# Patient Record
Sex: FEMALE | Race: WHITE | NOT HISPANIC OR LATINO | Employment: FULL TIME | URBAN - METROPOLITAN AREA
[De-identification: names, ages, dates, MRNs, and addresses within clinical notes are randomized per-mention and may not be internally consistent; named-entity substitution may affect disease eponyms.]

---

## 2017-01-16 ENCOUNTER — VIRTUAL VISIT (OUTPATIENT)
Dept: FAMILY MEDICINE | Facility: OTHER | Age: 52
End: 2017-01-16

## 2017-04-13 ENCOUNTER — OFFICE VISIT (OUTPATIENT)
Dept: URGENT CARE | Facility: URGENT CARE | Age: 52
End: 2017-04-13
Payer: COMMERCIAL

## 2017-04-13 VITALS
BODY MASS INDEX: 29.28 KG/M2 | DIASTOLIC BLOOD PRESSURE: 100 MMHG | WEIGHT: 154.2 LBS | HEART RATE: 63 BPM | SYSTOLIC BLOOD PRESSURE: 158 MMHG | OXYGEN SATURATION: 99 % | TEMPERATURE: 98.5 F

## 2017-04-13 DIAGNOSIS — L71.0 PERIORAL DERMATITIS: Primary | ICD-10-CM

## 2017-04-13 DIAGNOSIS — L01.00 IMPETIGO: ICD-10-CM

## 2017-04-13 PROCEDURE — 99213 OFFICE O/P EST LOW 20 MIN: CPT | Performed by: PHYSICIAN ASSISTANT

## 2017-04-13 RX ORDER — DOXYCYCLINE 100 MG/1
100 CAPSULE ORAL 2 TIMES DAILY
Qty: 20 CAPSULE | Refills: 0 | Status: SHIPPED | OUTPATIENT
Start: 2017-04-13 | End: 2017-07-19

## 2017-04-13 RX ORDER — MUPIROCIN 20 MG/G
OINTMENT TOPICAL 3 TIMES DAILY
Qty: 22 G | Refills: 1 | Status: SHIPPED | OUTPATIENT
Start: 2017-04-13 | End: 2017-04-18

## 2017-04-13 NOTE — MR AVS SNAPSHOT
After Visit Summary   4/13/2017    Augustus Mcgrath    MRN: [id number]           Patient Information     Date Of Birth          1965        Visit Information        Provider Department      4/13/2017 9:15 AM Javier Vazquez PA-C [facility]        Today's Diagnoses     Perioral dermatitis    -  1    Impetigo           Follow-ups after your visit        Who to contact     If you have questions or need follow up information about today's clinic visit or your schedule please contact [facility] directly at [phone].  Normal or non-critical lab and imaging results will be communicated to you by Au FINANCIERShart, letter or phone within 4 business days after the clinic has received the results. If you do not hear from us within 7 days, please contact the clinic through RingCaptchat or phone. If you have a critical or abnormal lab result, we will notify you by phone as soon as possible.  Submit refill requests through LikeWhere or call your pharmacy and they will forward the refill request to us. Please allow 3 business days for your refill to be completed.          Additional Information About Your Visit        MyChart Information     LikeWhere gives you secure access to your electronic health record. If you see a primary care provider, you can also send messages to your care team and make appointments. If you have questions, please call your primary care clinic.  If you do not have a primary care provider, please call [phone] and they will assist you.        Care EveryWhere ID     This is your Care EveryWhere ID. This could be used by other organizations to access your [facility] medical records  [id number]        Your Vitals Were     Pulse Temperature Pulse Oximetry BMI (Body Mass Index)          63 98.5  F (36.9  C) (Oral) 99% 29.28 kg/m2         Blood Pressure from Last 3 Encounters:   04/13/17 (!) 158/100   11/22/16 138/86   11/03/16 (!) 157/104     Weight from Last 3 Encounters:   04/13/17 154 lb 3.2 oz (69.9 kg)   11/22/16 154 lb 8 oz (70.1 kg)   11/03/16 157 lb (71.2 kg)              Today, you had the following     No orders found for display         Today's Medication Changes          These changes are accurate as of: 4/13/17  9:52 AM.  If you have any questions, ask your nurse or doctor.               Start taking these medicines.        Dose/Directions    doxycycline 100 MG capsule   Commonly known as:  VIBRAMYCIN   Used for:  Perioral dermatitis   Started by:  Javier Vazquez PA-C        Dose:  100 mg   Take 1 capsule (100 mg) by mouth 2 times daily   Quantity:  20 capsule   Refills:  0       mupirocin 2 % ointment   Commonly known as:  BACTROBAN   Used for:  Impetigo   Started by:  Javier Vazquez PA-C        Apply topically 3 times daily for 5 days   Quantity:  22 g   Refills:  1            Where to get your medicines      These medications were sent to [facility] [id number]     Phone:  [phone]     doxycycline 100 MG capsule    mupirocin 2 % ointment                Primary Care Provider Office Phone # Fax #    Sharlene Baltazar [phone]       [facility] [address]  [facility] [address]        Thank you!     Thank you for choosing [facility]  for your care. Our goal is always to provide you with excellent care. Hearing back from our patients is one way we can continue to improve our services. Please take a few minutes to complete the written survey that you may receive in the mail after your visit with us. Thank you!             Your Updated Medication List - Protect others around you: Learn how to safely use, store and throw away your medicines at www.disposemymeds.org.          This list is accurate as of: 4/13/17  9:52 AM.  Always use your most recent med list.                   Brand  Name Dispense Instructions for use    doxycycline 100 MG capsule    VIBRAMYCIN    20 capsule    Take 1 capsule (100 mg) by mouth 2 times daily       mupirocin 2 % ointment    BACTROBAN    22 g    Apply topically 3 times daily for 5 days

## 2017-04-13 NOTE — NURSING NOTE
"Chief Complaint   Patient presents with     Derm Problem     rash on face x last night        Initial BP (!) 158/100 (BP Location: Right arm, Patient Position: Chair, Cuff Size: Adult Regular)  Pulse 63  Temp 98.5  F (36.9  C) (Oral)  Wt 154 lb 3.2 oz (69.9 kg)  SpO2 99%  BMI 29.28 kg/m2 Estimated body mass index is 29.28 kg/(m^2) as calculated from the following:    Height as of 11/22/16: 5' 0.85\" (1.546 m).    Weight as of this encounter: 154 lb 3.2 oz (69.9 kg).  Medication Reconciliation: complete    "

## 2017-07-08 ENCOUNTER — HEALTH MAINTENANCE LETTER (OUTPATIENT)
Age: 52
End: 2017-07-08

## 2017-07-19 ENCOUNTER — OFFICE VISIT (OUTPATIENT)
Dept: FAMILY MEDICINE | Facility: CLINIC | Age: 52
End: 2017-07-19
Payer: COMMERCIAL

## 2017-07-19 VITALS
HEART RATE: 106 BPM | WEIGHT: 156 LBS | TEMPERATURE: 99.5 F | BODY MASS INDEX: 29.62 KG/M2 | DIASTOLIC BLOOD PRESSURE: 118 MMHG | SYSTOLIC BLOOD PRESSURE: 160 MMHG | RESPIRATION RATE: 16 BRPM | OXYGEN SATURATION: 99 %

## 2017-07-19 DIAGNOSIS — N30.90 CYSTITIS: Primary | ICD-10-CM

## 2017-07-19 DIAGNOSIS — N76.0 BACTERIAL VAGINOSIS: ICD-10-CM

## 2017-07-19 DIAGNOSIS — B96.89 BACTERIAL VAGINOSIS: ICD-10-CM

## 2017-07-19 DIAGNOSIS — I10 BENIGN ESSENTIAL HYPERTENSION: ICD-10-CM

## 2017-07-19 LAB
ALBUMIN UR-MCNC: 100 MG/DL
APPEARANCE UR: CLEAR
BACTERIA #/AREA URNS HPF: ABNORMAL /HPF
BILIRUB UR QL STRIP: NEGATIVE
COLOR UR AUTO: YELLOW
GLUCOSE UR STRIP-MCNC: NEGATIVE MG/DL
HGB UR QL STRIP: ABNORMAL
KETONES UR STRIP-MCNC: NEGATIVE MG/DL
LEUKOCYTE ESTERASE UR QL STRIP: ABNORMAL
MICRO REPORT STATUS: ABNORMAL
MUCOUS THREADS #/AREA URNS LPF: PRESENT /LPF
NITRATE UR QL: NEGATIVE
NON-SQ EPI CELLS #/AREA URNS LPF: ABNORMAL /LPF
PH UR STRIP: 5.5 PH (ref 5–7)
RBC #/AREA URNS AUTO: ABNORMAL /HPF (ref 0–2)
SP GR UR STRIP: 1.02 (ref 1–1.03)
SPECIMEN SOURCE: ABNORMAL
URN SPEC COLLECT METH UR: ABNORMAL
UROBILINOGEN UR STRIP-ACNC: 0.2 EU/DL (ref 0.2–1)
WBC #/AREA URNS AUTO: ABNORMAL /HPF (ref 0–2)
WET PREP SPEC: ABNORMAL

## 2017-07-19 PROCEDURE — 87186 SC STD MICRODIL/AGAR DIL: CPT | Performed by: FAMILY MEDICINE

## 2017-07-19 PROCEDURE — 87086 URINE CULTURE/COLONY COUNT: CPT | Performed by: FAMILY MEDICINE

## 2017-07-19 PROCEDURE — 81001 URINALYSIS AUTO W/SCOPE: CPT | Performed by: FAMILY MEDICINE

## 2017-07-19 PROCEDURE — 99214 OFFICE O/P EST MOD 30 MIN: CPT | Performed by: FAMILY MEDICINE

## 2017-07-19 PROCEDURE — 87088 URINE BACTERIA CULTURE: CPT | Performed by: FAMILY MEDICINE

## 2017-07-19 PROCEDURE — 87210 SMEAR WET MOUNT SALINE/INK: CPT | Performed by: FAMILY MEDICINE

## 2017-07-19 RX ORDER — CIPROFLOXACIN 250 MG/1
250 TABLET, FILM COATED ORAL 2 TIMES DAILY
Qty: 6 TABLET | Refills: 0 | Status: SHIPPED | OUTPATIENT
Start: 2017-07-19 | End: 2017-08-01

## 2017-07-19 RX ORDER — METRONIDAZOLE 500 MG/1
500 TABLET ORAL 2 TIMES DAILY
Qty: 14 TABLET | Refills: 0 | Status: SHIPPED | OUTPATIENT
Start: 2017-07-19 | End: 2017-07-26

## 2017-07-19 NOTE — PROGRESS NOTES
SUBJECTIVE:                                                    Augustus Mcgrath is a 51 year old female who presents to clinic today for the following health issues:      URINARY TRACT SYMPTOMS      Duration: Yesterday morning     Description  dysuria and urgency    Intensity:  mild    Accompanying signs and symptoms:  Fever/chills: no   Flank pain no   Nausea and vomiting: no   Vaginal symptoms:  itching  Abdominal/Pelvic Pain: YES- feeling that her bladder is full.    History  History of frequent UTI's: no   History of kidney stones: no   Sexually Active: YES  Possibility of pregnancy: No    Precipitating or alleviating factors: None    Therapies tried and outcome: monistat and IBU for pain   Outcome: help her with pain.    Pt also complain of itchy in the vaginal area.        Problem list and histories reviewed & adjusted, as indicated.  Additional history: as documented    Labs reviewed in EPIC    Reviewed and updated as needed this visit by clinical staffTobacco  Allergies  Med Hx  Surg Hx  Fam Hx  Soc Hx      Reviewed and updated as needed this visit by Provider         ROS:  Constitutional, HEENT, cardiovascular, pulmonary, gi and gu systems are negative, except as otherwise noted.      OBJECTIVE:   BP (!) 160/118  Pulse 106  Temp 99.5  F (37.5  C) (Tympanic)  Resp 16  Wt 156 lb (70.8 kg)  LMP 05/01/2017 (Approximate)  SpO2 99%  BMI 29.62 kg/m2  Body mass index is 29.62 kg/(m^2).  GENERAL: healthy, alert and no distress  EYES: Eyes grossly normal to inspection  HENT: nose and mouth without ulcers or lesions  PSYCH: mentation appears normal, affect normal    Diagnostic Test Results:  Results for orders placed or performed in visit on 07/19/17 (from the past 24 hour(s))   UA reflex to Microscopic and Culture   Result Value Ref Range    Color Urine Yellow     Appearance Urine Clear     Glucose Urine Negative NEG mg/dL    Bilirubin Urine Negative NEG    Ketones Urine Negative NEG mg/dL     Specific Gravity Urine 1.025 1.003 - 1.035    Blood Urine Large (A) NEG    pH Urine 5.5 5.0 - 7.0 pH    Protein Albumin Urine 100 (A) NEG mg/dL    Urobilinogen Urine 0.2 0.2 - 1.0 EU/dL    Nitrite Urine Negative NEG    Leukocyte Esterase Urine Trace (A) NEG    Source Midstream Urine    Urine Microscopic   Result Value Ref Range    WBC Urine 5-10 (A) 0 - 2 /HPF    RBC Urine 25-50 (A) 0 - 2 /HPF    Squamous Epithelial /LPF Urine Moderate (A) FEW /LPF    Bacteria Urine Moderate (A) NEG /HPF    Mucous Urine Present (A) NEG /LPF   Wet prep   Result Value Ref Range    Specimen Description Vagina     Wet Prep (A)      No Trichomonas seen  Clue cells seen  No yeast seen      Micro Report Status FINAL 07/19/2017        ASSESSMENT/PLAN:       ## Cystitis  - ciprofloxacin (CIPRO) 250 MG tablet; Take 1 tablet (250 mg) by mouth 2 times daily  Dispense: 6 tablet; Refill: 0  - Urine Culture Aerobic Bacterial pending   - Follow if symptoms worsen or fail to improve.    ## Bacterial vaginosis  - discussed medication side effects, no alcohol while on medication, metroNIDAZOLE (FLAGYL) 500 MG tablet; Take 1 tablet (500 mg) by mouth 2 times daily for 7 days  Dispense: 14 tablet; Refill: 0    ## Benign essential hypertension  - Pts B/P have been consistently elevated since 11/3/2016. Today B/P's were 162/120 and 160/118. I discussed with pt that her B/P have been consisently elevated and diagnosis of HTN along with complications. I recommended starting anti-hypertensive's. Pt is hesitant and would like to try lifestyle modifications. I recommended limiting salt intake and increasing physical activity. MA visit in 2 weeks to recheck B/P. If at that time B/P is greater than 140/90, pt would benefit from starting anti-hypertensive's.       Sreekanth Rene MD  [facility]

## 2017-07-19 NOTE — MR AVS SNAPSHOT
After Visit Summary   7/19/2017    Augustus Mcgrath    MRN: [id number]           Patient Information     Date Of Birth          1965        Visit Information        Provider Department      7/19/2017 8:20 AM Sreekanth Rene MD [facility]        Today's Diagnoses     Cystitis    -  1    Bacterial vaginosis        Benign essential hypertension           Follow-ups after your visit        Your next 10 appointments already scheduled     Jul 31, 2017  9:00 AM CDT   Nurse Only with [facility]   [facility] ([facility])    [address] [place] [address] [phone]              Who to contact     If you have questions or need follow up information about today's clinic visit or your schedule please contact [facility] directly at [phone].  Normal or non-critical lab and imaging results will be communicated to you by MyChart, letter or phone within 4 business days after the clinic has received the results. If you do not hear from us within 7 days, please contact the clinic through MyChart or phone. If you have a critical or abnormal lab result, we will notify you by phone as soon as possible.  Submit refill requests through Clinipace WorldWide or call your pharmacy and they will forward the refill request to us. Please allow 3 business days for your refill to be completed.          Additional Information About Your Visit        MyChart Information     Clinipace WorldWide gives you secure access to your electronic health record. If you see a primary care provider, you can also send messages to your care team and make appointments. If you have questions, please call your primary care clinic.  If you do not have a primary care provider, please call [phone] and they will assist you.        Care EveryWhere ID     This is your Care EveryWhere ID. This could be used by other organizations to access your [facility]  records  [id number]        Your Vitals Were     Pulse Temperature Respirations Last Period Pulse Oximetry BMI (Body Mass Index)    106 99.5  F (37.5  C) (Tympanic) 16 05/01/2017 (Approximate) 99% 29.62 kg/m2       Blood Pressure from Last 3 Encounters:   07/19/17 (!) 160/118   04/13/17 (!) 158/100   11/22/16 138/86    Weight from Last 3 Encounters:   07/19/17 156 lb (70.8 kg)   04/13/17 154 lb 3.2 oz (69.9 kg)   11/22/16 154 lb 8 oz (70.1 kg)              We Performed the Following     UA reflex to Microscopic and Culture     Urine Culture Aerobic Bacterial     Urine Microscopic     Wet prep          Today's Medication Changes          These changes are accurate as of: 7/19/17  9:56 AM.  If you have any questions, ask your nurse or doctor.               Start taking these medicines.        Dose/Directions    ciprofloxacin 250 MG tablet   Commonly known as:  CIPRO   Used for:  Cystitis   Started by:  Sreekanth Rene MD        Dose:  250 mg   Take 1 tablet (250 mg) by mouth 2 times daily   Quantity:  6 tablet   Refills:  0       metroNIDAZOLE 500 MG tablet   Commonly known as:  FLAGYL   Used for:  Bacterial vaginosis   Started by:  Sreekanth Rene MD        Dose:  500 mg   Take 1 tablet (500 mg) by mouth 2 times daily for 7 days   Quantity:  14 tablet   Refills:  0            Where to get your medicines      These medications were sent to [facility] [address]  [address] [facility] [address]     Phone:  [phone]     ciprofloxacin 250 MG tablet    metroNIDAZOLE 500 MG tablet                Primary Care Provider Office Phone # Fax #    Sharlene Baltazar [phone]       [facility] [address]  [facility] [address]        Equal Access to Services     [facility] BEATRIS AH: Jaswinder Sanderson, wajen luqadaha, qaybta kaalmada cynthia, soila licona. [facility] [phone].    ATENCIÓN:  Si habla gumaro, tiene a lang disposición servicios gratuitos de asistencia lingüística. Emile becerra [phone].    We comply with applicable federal civil rights laws and [place] laws. We do not discriminate on the basis of race, color, national origin, age, disability sex, sexual orientation or gender identity.            Thank you!     Thank you for choosing [facility]  for your care. Our goal is always to provide you with excellent care. Hearing back from our patients is one way we can continue to improve our services. Please take a few minutes to complete the written survey that you may receive in the mail after your visit with us. Thank you!             Your Updated Medication List - Protect others around you: Learn how to safely use, store and throw away your medicines at www.disposemymeds.org.          This list is accurate as of: 7/19/17  9:56 AM.  Always use your most recent med list.                   Brand Name Dispense Instructions for use Diagnosis    ciprofloxacin 250 MG tablet    CIPRO    6 tablet    Take 1 tablet (250 mg) by mouth 2 times daily    Cystitis       metroNIDAZOLE 500 MG tablet    FLAGYL    14 tablet    Take 1 tablet (500 mg) by mouth 2 times daily for 7 days    Bacterial vaginosis

## 2017-07-19 NOTE — NURSING NOTE
"Chief Complaint   Patient presents with     UTI       Initial BP (!) 162/120 (BP Location: Right arm, Patient Position: Chair, Cuff Size: Adult Regular)  Pulse 106  Temp 99.5  F (37.5  C) (Tympanic)  Resp 16  Wt 156 lb (70.8 kg)  LMP 05/01/2017 (Approximate)  SpO2 99%  BMI 29.62 kg/m2 Estimated body mass index is 29.62 kg/(m^2) as calculated from the following:    Height as of 11/22/16: 5' 0.85\" (1.546 m).    Weight as of this encounter: 156 lb (70.8 kg).  Medication Reconciliation: complete     Yolanda Montilla MA      "

## 2017-07-21 LAB
BACTERIA SPEC CULT: ABNORMAL
MICRO REPORT STATUS: ABNORMAL
MICROORGANISM SPEC CULT: ABNORMAL
SPECIMEN SOURCE: ABNORMAL

## 2017-08-01 ENCOUNTER — OFFICE VISIT (OUTPATIENT)
Dept: FAMILY MEDICINE | Facility: CLINIC | Age: 52
End: 2017-08-01
Payer: COMMERCIAL

## 2017-08-01 VITALS
BODY MASS INDEX: 29.24 KG/M2 | DIASTOLIC BLOOD PRESSURE: 98 MMHG | TEMPERATURE: 99.2 F | SYSTOLIC BLOOD PRESSURE: 156 MMHG | HEART RATE: 69 BPM | OXYGEN SATURATION: 98 % | WEIGHT: 154 LBS

## 2017-08-01 DIAGNOSIS — I10 HYPERTENSION, GOAL BELOW 140/90: Primary | ICD-10-CM

## 2017-08-01 PROCEDURE — 99213 OFFICE O/P EST LOW 20 MIN: CPT | Performed by: FAMILY MEDICINE

## 2017-08-01 RX ORDER — LISINOPRIL 20 MG/1
20 TABLET ORAL DAILY
Qty: 90 TABLET | Refills: 1 | Status: SHIPPED | OUTPATIENT
Start: 2017-08-01 | End: 2017-08-01

## 2017-08-01 RX ORDER — LISINOPRIL 20 MG/1
20 TABLET ORAL DAILY
Qty: 90 TABLET | Refills: 1 | Status: SHIPPED | OUTPATIENT
Start: 2017-08-01 | End: 2018-02-14

## 2017-08-01 NOTE — NURSING NOTE
"Chief Complaint   Patient presents with     Hypertension       Initial BP (!) 156/98  Pulse 69  Temp 99.2  F (37.3  C) (Tympanic)  Wt 154 lb (69.9 kg)  LMP 05/01/2017 (Approximate)  SpO2 98%  BMI 29.24 kg/m2 Estimated body mass index is 29.24 kg/(m^2) as calculated from the following:    Height as of 11/22/16: 5' 0.85\" (1.546 m).    Weight as of this encounter: 154 lb (69.9 kg).  Medication Reconciliation: complete     Claudia Long MA    "

## 2017-08-01 NOTE — MR AVS SNAPSHOT
After Visit Summary   8/1/2017    Augustus Mcgrath    MRN: [id number]           Patient Information     Date Of Birth          1965        Visit Information        Provider Department      8/1/2017 2:20 PM Sharlene Baltazar MD [facility]        Today's Diagnoses     Hypertension, goal below 140/90    -  1       Follow-ups after your visit        Follow-up notes from your care team     Return in about 4 weeks (around 8/29/2017) for BP Recheck.      Who to contact     If you have questions or need follow up information about today's clinic visit or your schedule please contact [facility] directly at [phone].  Normal or non-critical lab and imaging results will be communicated to you by MyChart, letter or phone within 4 business days after the clinic has received the results. If you do not hear from us within 7 days, please contact the clinic through MineSense Technologieshart or phone. If you have a critical or abnormal lab result, we will notify you by phone as soon as possible.  Submit refill requests through Kirondo or call your pharmacy and they will forward the refill request to us. Please allow 3 business days for your refill to be completed.          Additional Information About Your Visit        MyChart Information     Kirondo gives you secure access to your electronic health record. If you see a primary care provider, you can also send messages to your care team and make appointments. If you have questions, please call your primary care clinic.  If you do not have a primary care provider, please call [phone] and they will assist you.        Care EveryWhere ID     This is your Care EveryWhere ID. This could be used by other organizations to access your [facility] medical records  [id number]        Your Vitals Were     Pulse Temperature Last Period Pulse Oximetry BMI (Body Mass Index)       69 99.2  F (37.3  C) (Tympanic) 05/01/2017 (Approximate) 98% 29.24 kg/m2         Blood Pressure from Last 3 Encounters:   08/01/17 (!) 156/98   07/19/17 (!) 160/118   04/13/17 (!) 158/100    Weight from Last 3 Encounters:   08/01/17 154 lb (69.9 kg)   07/19/17 156 lb (70.8 kg)   04/13/17 154 lb 3.2 oz (69.9 kg)              Today, you had the following     No orders found for display         Today's Medication Changes          These changes are accurate as of: 8/1/17  3:11 PM.  If you have any questions, ask your nurse or doctor.               Start taking these medicines.        Dose/Directions    lisinopril 20 MG tablet   Commonly known as:  PRINIVIL/ZESTRIL   Used for:  Hypertension, goal below 140/90   Started by:  Sharlene Baltazar MD        Dose:  20 mg   Take 1 tablet (20 mg) by mouth daily   Quantity:  90 tablet   Refills:  1            Where to get your medicines      These medications were sent to [facility] [address] [place] [address] AT [address] [address]    Hours:  24-hours Phone:  [phone]     lisinopril 20 MG tablet                Primary Care Provider Office Phone # Fax #    Sharlene Baltazar [phone]       [facility] [address] [facility] [address]        Equal Access to Services     CASEPR SPEAR AH: Hadii kierra ku hadasho Solaruenali, waaxda luqadaha, qaybta kaalmada adeegyada, soila licona. So [facility] [phone].    ATENCIÓN: Si habla español, tiene a lang disposición servicios gratuitos de asistencia lingüística. Llame al [phone].    We comply with applicable federal civil rights laws and [place] laws. We do not discriminate on the basis of race, color, national origin, age, disability sex, sexual orientation or gender identity.            Thank you!     Thank you for choosing [facility]  for your care. Our goal is always to provide you with excellent care. Hearing back from our patients is  one way we can continue to improve our services. Please take a few minutes to complete the written survey that you may receive in the mail after your visit with us. Thank you!             Your Updated Medication List - Protect others around you: Learn how to safely use, store and throw away your medicines at www.disposemymeds.org.          This list is accurate as of: 8/1/17  3:11 PM.  Always use your most recent med list.                   Brand Name Dispense Instructions for use Diagnosis    lisinopril 20 MG tablet    PRINIVIL/ZESTRIL    90 tablet    Take 1 tablet (20 mg) by mouth daily    Hypertension, goal below 140/90

## 2017-08-01 NOTE — PROGRESS NOTES
SUBJECTIVE:                                                    Augustus Mcgrath is a 51 year old female who presents to clinic today for the following health issues:      Hypertension Follow-up      Outpatient blood pressures have been high at home    Low Salt Diet: Low salt, looking on the packaging now to choose lower salt    She denies chest pain, headaches, SOB.    She tries to get regular exercise.    Two sisters have hypertension, one tried to use diet (Atkins), one also has pre-diabetes and is on losartan    Wt Readings from Last 4 Encounters:   17 154 lb (69.9 kg)   17 156 lb (70.8 kg)   17 154 lb 3.2 oz (69.9 kg)   16 154 lb 8 oz (70.1 kg)        Amount of exercise or physical activity: 4-5 days/week for an average of unsure about 7992-3923 steps    Problems taking medications regularly: No    Medication side effects: none  Diet: regular (no restrictions)      Problem list and histories reviewed & adjusted, as indicated.  Additional history: as documented    Patient Active Problem List   Diagnosis     Menorrhagia     CARDIOVASCULAR SCREENING; LDL GOAL LESS THAN 160     Enthesopathy     Elevated blood pressure reading without diagnosis of hypertension     Need for hepatitis C screening test     Iron deficiency anemia due to chronic blood loss     Hypertension, goal below 140/90     Past Surgical History:   Procedure Laterality Date     retinal surgery      scleral buckle and several lasers both eyes     VITRECTOMY ANTERIOR  13    -[facility]       Social History   Substance Use Topics     Smoking status: Former Smoker     Packs/day: 0.50     Years: 17.00     Types: Cigarettes     Quit date: 11/10/2010     Smokeless tobacco: Never Used      Comment: occ     Alcohol use Yes      Comment: 5 drinks a week      Family History   Problem Relation Age of Onset     Hypertension Mother      HEART DISEASE Mother 85     Heart Failure Mother       of renal failure  related to cardiac testing     Lipids Father      HEART DISEASE Father 85      suddenly while doing yardwork     CEREBROVASCULAR DISEASE Maternal Grandfather      CEREBROVASCULAR DISEASE Paternal Grandfather      Thyroid Disease Sister      hypo     Breast Cancer Sister 53     Uterine Cancer Sister 53     Hypertension Sister          Allergies   Allergen Reactions     Nkda [No Known Drug Allergies]      BP Readings from Last 3 Encounters:   17 (!) 156/98   17 (!) 160/118   17 (!) 158/100    Wt Readings from Last 3 Encounters:   17 154 lb (69.9 kg)   17 156 lb (70.8 kg)   17 154 lb 3.2 oz (69.9 kg)          Reviewed and updated as needed this visit by clinical staffTobacco  Allergies  Meds  Med Hx  Surg Hx  Fam Hx  Soc Hx      Reviewed and updated as needed this visit by Provider         ROS:  Constitutional, HEENT, cardiovascular, pulmonary, gi and gu systems are negative, except as otherwise noted.      OBJECTIVE:   BP (!) 156/98  Pulse 69  Temp 99.2  F (37.3  C) (Tympanic)  Wt 154 lb (69.9 kg)  LMP 2017 (Approximate)  SpO2 98%  BMI 29.24 kg/m2  Body mass index is 29.24 kg/(m^2).  GENERAL: healthy, alert and no distress  NECK: no adenopathy, no asymmetry, masses, or scars, thyroid normal to palpation and no carotid bruits  RESP: lungs clear to auscultation - no rales, rhonchi or wheezes  CV: regular rate and rhythm, normal S1 S2, no S3 or S4, no murmur, click or rub, no peripheral edema and peripheral pulses strong  MS: no gross musculoskeletal defects noted, no edema    Diagnostic Test Results:  none     ASSESSMENT/PLAN:         1. Hypertension, goal below 140/90  New diagnosis today, blood pressure not at goal  Start lisinopril  Augustus to check BP's at home and let me know  Return to clinic 1 mo for follow up blood pressure and labs  - lisinopril (PRINIVIL/ZESTRIL) 20 MG tablet; Take 1 tablet (20 mg) by mouth daily  Dispense: 90 tablet; Refill:  1    Sharlene Baltazar MD  [facility]

## 2018-02-14 ENCOUNTER — TELEPHONE (OUTPATIENT)
Dept: FAMILY MEDICINE | Facility: CLINIC | Age: 53
End: 2018-02-14

## 2018-02-14 DIAGNOSIS — I10 HYPERTENSION, GOAL BELOW 140/90: ICD-10-CM

## 2018-02-15 NOTE — TELEPHONE ENCOUNTER
"Requested Prescriptions   Pending Prescriptions Disp Refills     lisinopril (PRINIVIL/ZESTRIL) 20 MG tablet [Pharmacy Med Name: LISINOPRIL 20MG TABS]  Last Written Prescription Date:  8/1/2017  Last Fill Quantity: 90 tablet,  # refills: 1   Last Office Visit: 8/1/2017   Future Office Visit:      90 tablet 1     Sig: TAKE ONE TABLET BY MOUTH EVERY DAY    ACE Inhibitors (Including Combos) Protocol Failed    2/14/2018  5:09 PM       Failed - Blood pressure under 140/90 in past 12 months    BP Readings from Last 3 Encounters:   08/01/17 (!) 156/98   07/19/17 (!) 160/118   04/13/17 (!) 158/100          Failed - Normal serum creatinine on file in past 12 months    Recent Labs   Lab Test  11/22/16   0854   CR  0.74          Failed - Normal serum potassium on file in past 12 months    Recent Labs   Lab Test  11/22/16   0854   POTASSIUM  4.5          Passed - Recent or future visit with authorizing provider's specialty    Patient had office visit in the last year or has a visit in the next 30 days with authorizing provider.  See \"Patient Info\" tab in inbasket, or \"Choose Columns\" in Meds & Orders section of the refill encounter.          Passed - Patient is age 18 or older       Passed - No active pregnancy on record       Passed - No positive pregnancy test in past 12 months          "

## 2018-02-16 RX ORDER — LISINOPRIL 20 MG/1
TABLET ORAL
Qty: 30 TABLET | Refills: 0 | Status: SHIPPED | OUTPATIENT
Start: 2018-02-16 | End: 2018-03-01

## 2018-02-16 NOTE — TELEPHONE ENCOUNTER
BP above range.  From last OV notes:  1. Hypertension, goal below 140/90  New diagnosis today, blood pressure not at goal  Start lisinopril  Augustus to check BP's at home and let me know  Return to clinic 1 mo for follow up blood pressure and labs  - lisinopril (PRINIVIL/ZESTRIL) 20 MG tablet; Take 1 tablet (20 mg) by mouth daily  Dispense: 90 tablet; Refill: 1  Renetta Martins RN    Reception:  Please call patient to schedule follow-up visit.  Provider:  1 month refill pended.  Message added to schedule OV for blood pressure  Renetta Martins RN

## 2018-03-01 ENCOUNTER — OFFICE VISIT (OUTPATIENT)
Dept: FAMILY MEDICINE | Facility: CLINIC | Age: 53
End: 2018-03-01
Payer: COMMERCIAL

## 2018-03-01 VITALS
RESPIRATION RATE: 16 BRPM | SYSTOLIC BLOOD PRESSURE: 141 MMHG | OXYGEN SATURATION: 98 % | HEIGHT: 61 IN | DIASTOLIC BLOOD PRESSURE: 93 MMHG | TEMPERATURE: 98.5 F | HEART RATE: 86 BPM | BODY MASS INDEX: 29.45 KG/M2 | WEIGHT: 156 LBS

## 2018-03-01 DIAGNOSIS — I10 HYPERTENSION, GOAL BELOW 140/90: ICD-10-CM

## 2018-03-01 DIAGNOSIS — Z12.11 SCREEN FOR COLON CANCER: ICD-10-CM

## 2018-03-01 DIAGNOSIS — Z12.4 SCREENING FOR MALIGNANT NEOPLASM OF CERVIX: ICD-10-CM

## 2018-03-01 DIAGNOSIS — D50.0 IRON DEFICIENCY ANEMIA DUE TO CHRONIC BLOOD LOSS: ICD-10-CM

## 2018-03-01 DIAGNOSIS — Z13.6 CARDIOVASCULAR SCREENING; LDL GOAL LESS THAN 160: ICD-10-CM

## 2018-03-01 DIAGNOSIS — Z01.419 ENCOUNTER FOR WELL WOMAN EXAM WITH ROUTINE GYNECOLOGICAL EXAM: Primary | ICD-10-CM

## 2018-03-01 LAB
ANION GAP SERPL CALCULATED.3IONS-SCNC: 5 MMOL/L (ref 3–14)
BASOPHILS # BLD AUTO: 0 10E9/L (ref 0–0.2)
BASOPHILS NFR BLD AUTO: 0.2 %
BUN SERPL-MCNC: 13 MG/DL (ref 7–30)
CALCIUM SERPL-MCNC: 9.8 MG/DL (ref 8.5–10.1)
CHLORIDE SERPL-SCNC: 107 MMOL/L (ref 94–109)
CO2 SERPL-SCNC: 26 MMOL/L (ref 20–32)
CREAT SERPL-MCNC: 0.65 MG/DL (ref 0.52–1.04)
CREAT UR-MCNC: 210 MG/DL
DIFFERENTIAL METHOD BLD: ABNORMAL
EOSINOPHIL # BLD AUTO: 0.1 10E9/L (ref 0–0.7)
EOSINOPHIL NFR BLD AUTO: 1.2 %
ERYTHROCYTE [DISTWIDTH] IN BLOOD BY AUTOMATED COUNT: 15.4 % (ref 10–15)
GFR SERPL CREATININE-BSD FRML MDRD: >90 ML/MIN/1.7M2
GLUCOSE SERPL-MCNC: 92 MG/DL (ref 70–99)
HCT VFR BLD AUTO: 35.7 % (ref 35–47)
HGB BLD-MCNC: 10.7 G/DL (ref 11.7–15.7)
IRON SATN MFR SERPL: 6 % (ref 15–46)
IRON SERPL-MCNC: 24 UG/DL (ref 35–180)
LYMPHOCYTES # BLD AUTO: 1.9 10E9/L (ref 0.8–5.3)
LYMPHOCYTES NFR BLD AUTO: 18 %
MCH RBC QN AUTO: 23.2 PG (ref 26.5–33)
MCHC RBC AUTO-ENTMCNC: 30 G/DL (ref 31.5–36.5)
MCV RBC AUTO: 77 FL (ref 78–100)
MICROALBUMIN UR-MCNC: 14 MG/L
MICROALBUMIN/CREAT UR: 6.9 MG/G CR (ref 0–25)
MONOCYTES # BLD AUTO: 0.7 10E9/L (ref 0–1.3)
MONOCYTES NFR BLD AUTO: 6.6 %
NEUTROPHILS # BLD AUTO: 7.7 10E9/L (ref 1.6–8.3)
NEUTROPHILS NFR BLD AUTO: 74 %
PLATELET # BLD AUTO: 276 10E9/L (ref 150–450)
POTASSIUM SERPL-SCNC: 3.9 MMOL/L (ref 3.4–5.3)
RBC # BLD AUTO: 4.61 10E12/L (ref 3.8–5.2)
SODIUM SERPL-SCNC: 138 MMOL/L (ref 133–144)
TIBC SERPL-MCNC: 431 UG/DL (ref 240–430)
WBC # BLD AUTO: 10.4 10E9/L (ref 4–11)

## 2018-03-01 PROCEDURE — G0145 SCR C/V CYTO,THINLAYER,RESCR: HCPCS | Performed by: FAMILY MEDICINE

## 2018-03-01 PROCEDURE — 83540 ASSAY OF IRON: CPT | Performed by: FAMILY MEDICINE

## 2018-03-01 PROCEDURE — 83550 IRON BINDING TEST: CPT | Performed by: FAMILY MEDICINE

## 2018-03-01 PROCEDURE — 99396 PREV VISIT EST AGE 40-64: CPT | Performed by: FAMILY MEDICINE

## 2018-03-01 PROCEDURE — G0124 SCREEN C/V THIN LAYER BY MD: HCPCS | Performed by: FAMILY MEDICINE

## 2018-03-01 PROCEDURE — 87624 HPV HI-RISK TYP POOLED RSLT: CPT | Performed by: FAMILY MEDICINE

## 2018-03-01 PROCEDURE — 85025 COMPLETE CBC W/AUTO DIFF WBC: CPT | Performed by: FAMILY MEDICINE

## 2018-03-01 PROCEDURE — 82043 UR ALBUMIN QUANTITATIVE: CPT | Performed by: FAMILY MEDICINE

## 2018-03-01 PROCEDURE — 80048 BASIC METABOLIC PNL TOTAL CA: CPT | Performed by: FAMILY MEDICINE

## 2018-03-01 PROCEDURE — 36415 COLL VENOUS BLD VENIPUNCTURE: CPT | Performed by: FAMILY MEDICINE

## 2018-03-01 RX ORDER — LISINOPRIL 20 MG/1
20 TABLET ORAL DAILY
Qty: 90 TABLET | Refills: 3 | Status: SHIPPED | OUTPATIENT
Start: 2018-03-01 | End: 2019-01-02

## 2018-03-01 NOTE — PROGRESS NOTES
SUBJECTIVE:   CC: Augustus Mcgrath is an 52 year old woman who presents for preventive health visit.     Physical   Annual:     Getting at least 3 servings of Calcium per day::  Yes    Bi-annual eye exam::  Yes    Dental care twice a year::  Yes    Sleep apnea or symptoms of sleep apnea::  None    Diet::  Low salt    Taking medications regularly::  Yes    Medication side effects::  None          Hypertension Follow-up      Outpatient blood pressures are not being checked.    Low Salt Diet: low salt    Does have a tickle in her throat from the lisinopril  BP Readings from Last 3 Encounters:   03/01/18 (!) 141/93   08/01/17 (!) 156/98   07/19/17 (!) 160/118          Health Maintenance Due   Topic Date Due     MICROALBUMIN Q1 YEAR  10/18/1966     PAP Q3 YR  07/10/2016     COLON CANCER SCREEN (SYSTEM ASSIGNED)  10/18/2017     BMP Q1 YR  11/22/2017     CBC Q1 YR  11/22/2017      Hemoglobin   Date Value Ref Range Status   11/22/2016 9.5 (L) 11.7 - 15.7 g/dL Final   06/17/2013 10.6 (L) 11.7 - 15.7 g/dL Final   11/02/2009 8.7 (L) 11.7 - 15.7 g/dL Final     Today's PHQ-2 Score:   PHQ-2 ( 1999 [employer]) 3/1/2018   Q1: Little interest or pleasure in doing things 0   Q2: Feeling down, depressed or hopeless 0   PHQ-2 Score 0   Q1: Little interest or pleasure in doing things Not at all   Q2: Feeling down, depressed or hopeless Not at all   PHQ-2 Score 0       Abuse: Current or Past(Physical, Sexual or Emotional)- No  Do you feel safe in your environment - Yes    Social History   Substance Use Topics     Smoking status: Former Smoker     Packs/day: 0.50     Years: 17.00     Types: Cigarettes     Quit date: 11/10/2010     Smokeless tobacco: Never Used      Comment: occ     Alcohol use Yes      Comment: 5 drinks a week      No flowsheet data found.    Reviewed orders with patient.  Reviewed health maintenance and updated orders accordingly - Yes  BP Readings from Last 3 Encounters:   03/01/18 (!) 141/93   08/01/17 (!) 156/98    17 (!) 160/118    Wt Readings from Last 3 Encounters:   18 156 lb (70.8 kg)   17 154 lb (69.9 kg)   17 156 lb (70.8 kg)                  Current Outpatient Prescriptions   Medication Sig Dispense Refill     lisinopril (PRINIVIL/ZESTRIL) 20 MG tablet Take 1 tablet (20 mg) by mouth daily 90 tablet 3     [DISCONTINUED] lisinopril (PRINIVIL/ZESTRIL) 20 MG tablet TAKE ONE TABLET BY MOUTH EVERY DAY 30 tablet 0     Allergies   Allergen Reactions     Nkda [No Known Drug Allergies]      Recent Labs   Lab Test  16   0854   LDL  110*   HDL  48*   TRIG  148   CR  0.74   GFRESTIMATED  84   GFRESTBLACK  >90   GFR Calc     POTASSIUM  4.5        Patient over age 50, mutual decision to screen reflected in health maintenance.    Pertinent mammograms are reviewed under the imaging tab.  History of abnormal Pap smear:   NO - age 30-65 PAP every 5 years with negative HPV co-testing recommended  Last 3 Pap and HPV Results:   PAP / HPV 7/10/2013 2009 2007   PAP NIL ASC-US(A) NIL       Reviewed and updated as needed this visit by clinical staff  Tobacco  Allergies  Meds  Med Hx  Surg Hx  Fam Hx  Soc Hx        Reviewed and updated as needed this visit by Provider        Past Medical History:   Diagnosis Date     Contraception      has vasectomy     Eye problems     right eye, retinal detachment, LEFT EY PROBLEMATIC AS WELL     Hypertension      Iron deficiency anemia due to chronic blood loss 2016      Past Surgical History:   Procedure Laterality Date     retinal surgery      scleral buckle and several lasers both eyes     VITRECTOMY ANTERIOR  13    -[facility]     Obstetric History       T0      L0     SAB0   TAB1   Ectopic0   Multiple0   Live Births0       # Outcome Date GA Lbr Tono/2nd Weight Sex Delivery Anes PTL Lv   1 TAB                 Family History   Problem Relation Age of Onset     Hypertension Mother      HEART  "DISEASE Mother 85     Heart Failure Mother       of renal failure related to cardiac testing     Lipids Father      HEART DISEASE Father 85      suddenly while doing yardwork     Hyperlipidemia Father      CEREBROVASCULAR DISEASE Maternal Grandfather      CEREBROVASCULAR DISEASE Paternal Grandfather      Thyroid Disease Sister      hypo     Breast Cancer Sister 53     Uterine Cancer Sister 53     Hypertension Sister      Hypertension Sister      Other Cancer Sister      Thyroid Disease Sister      Breast Cancer Sister         Review of Systems  C: NEGATIVE for fever, chills, change in weight  I: NEGATIVE for worrisome rashes, moles or lesions  E: NEGATIVE for vision changes or irritation  ENT: NEGATIVE for ear, mouth and throat problems  R: NEGATIVE for significant cough or SOB  B: NEGATIVE for masses, tenderness or discharge  CV: NEGATIVE for chest pain, palpitations or peripheral edema  GI: NEGATIVE for nausea, abdominal pain, heartburn, or change in bowel habits   menopausal female: still having periods, sometimes every 3 weeks  M: NEGATIVE for significant arthralgias or myalgia  N: NEGATIVE for weakness, dizziness or paresthesias  E: NEGATIVE for temperature intolerance, skin/hair changes  H: NEGATIVE for bleeding problems  P: NEGATIVE for changes in mood or affect      OBJECTIVE:   BP (!) 141/93  Pulse 86  Temp 98.5  F (36.9  C) (Oral)  Resp 16  Ht 5' 1\" (1.549 m)  Wt 156 lb (70.8 kg)  SpO2 98%  BMI 29.48 kg/m2  Physical Exam  GENERAL APPEARANCE: healthy, alert and no distress  EYES: Eyes grossly normal to inspection, PERRL and conjunctivae and sclerae normal  HENT: ear canals and TM's normal, nose and mouth without ulcers or lesions, oropharynx clear and oral mucous membranes moist  NECK: no adenopathy, no asymmetry, masses, or scars and thyroid normal to palpation  RESP: lungs clear to auscultation - no rales, rhonchi or wheezes  BREAST: normal without masses, tenderness or nipple discharge " and no palpable axillary masses or adenopathy  CV: regular rate and rhythm, normal S1 S2, no S3 or S4, no murmur, click or rub, no peripheral edema and peripheral pulses strong  ABDOMEN: soft, nontender, no hepatosplenomegaly, no masses and bowel sounds normal   (female): normal female external genitalia, normal urethral meatus, vaginal mucosal atrophy noted, normal cervix, adnexae, and uterus without masses or abnormal discharge  MS: no musculoskeletal defects are noted and gait is age appropriate without ataxia  SKIN: no suspicious lesions or rashes  NEURO: Normal strength and tone, sensory exam grossly normal, mentation intact and speech normal  PSYCH: mentation appears normal and affect normal/bright    ASSESSMENT/PLAN:   1. Encounter for well woman exam with routine gynecological exam  routine  - Pap imaged thin layer screen with HPV - recommended age 30 - 65 years (select HPV order below)  - HPV High Risk Types DNA Cervical    2. Hypertension, goal below 140/90  Not quite at goal she will check at home and let me know  - BASIC METABOLIC PANEL  - Albumin Random Urine Quantitative with Creat Ratio  - lisinopril (PRINIVIL/ZESTRIL) 20 MG tablet; Take 1 tablet (20 mg) by mouth daily  Dispense: 90 tablet; Refill: 3    3. CARDIOVASCULAR SCREENING; LDL GOAL LESS THAN 160  LDL Cholesterol Calculated   Date Value Ref Range Status   11/22/2016 110 (H) <100 mg/dL Final     Comment:     Above desirable:  100-129 mg/dl   Borderline High:  130-159 mg/dL   High:             160-189 mg/dL   Very high:       >189 mg/dl     ]  At goal    4. Iron deficiency anemia due to chronic blood loss  .  Hemoglobin   Date Value Ref Range Status   11/22/2016 9.5 (L) 11.7 - 15.7 g/dL Final   06/17/2013 10.6 (L) 11.7 - 15.7 g/dL Final   11/02/2009 8.7 (L) 11.7 - 15.7 g/dL Final    will recheck today  - CBC with platelets differential  - Iron and iron binding capacity    5. Screen for colon cancer  Routine screening  - GASTROENTEROLOGY ADULT  "REF PROCEDURE ONLY Margie Joseph [phone]; No Provider Preference    6. Screening for malignant neoplasm of cervix  Done today      COUNSELING:  Reviewed preventive health counseling, as reflected in patient instructions     reports that she quit smoking about 7 years ago. Her smoking use included Cigarettes. She has a 8.50 pack-year smoking history. She has never used smokeless tobacco.    Estimated body mass index is 29.48 kg/(m^2) as calculated from the following:    Height as of this encounter: 5' 1\" (1.549 m).    Weight as of this encounter: 156 lb (70.8 kg).   Weight management plan: Discussed healthy diet and exercise guidelines and patient will follow up in 12 months in clinic to re-evaluate.    Counseling Resources:  ATP IV Guidelines  Pooled Cohorts Equation Calculator  Breast Cancer Risk Calculator  FRAX Risk Assessment  ICSI Preventive Guidelines  Dietary Guidelines for Americans, 2010  USDA's MyPlate  ASA Prophylaxis  Lung CA Screening    Sharlene Baltazar MD  [facility]  Answers for HPI/ROS submitted by the patient on 3/1/2018   PHQ-2 Score: 0    "

## 2018-03-01 NOTE — PATIENT INSTRUCTIONS
Please check your blood pressure at home and let me know if it continues to be > 140/90.    Preventive Health Recommendations  Female Ages 50 - 64    Yearly exam: See your health care provider every year in order to  o Review health changes.   o Discuss preventive care.    o Review your medicines if your doctor has prescribed any.      Get a Pap test every three years (unless you have an abnormal result and your provider advises testing more often).    If you get Pap tests with HPV test, you only need to test every 5 years, unless you have an abnormal result.     You do not need a Pap test if your uterus was removed (hysterectomy) and you have not had cancer.    You should be tested each year for STDs (sexually transmitted diseases) if you're at risk.     Have a mammogram every 1 to 2 years.    Have a colonoscopy at age 50, or have a yearly FIT test (stool test). These exams screen for colon cancer.      Have a cholesterol test every 5 years, or more often if advised.    Have a diabetes test (fasting glucose) every three years. If you are at risk for diabetes, you should have this test more often.     If you are at risk for osteoporosis (brittle bone disease), think about having a bone density scan (DEXA).    Shots: Get a flu shot each year. Get a tetanus shot every 10 years.    Nutrition:     Eat at least 5 servings of fruits and vegetables each day.    Eat whole-grain bread, whole-wheat pasta and brown rice instead of white grains and rice.    Talk to your provider about Calcium and Vitamin D.     Lifestyle    Exercise at least 150 minutes a week (30 minutes a day, 5 days a week). This will help you control your weight and prevent disease.    Limit alcohol to one drink per day.    No smoking.     Wear sunscreen to prevent skin cancer.     See your dentist every six months for an exam and cleaning.    See your eye doctor every 1 to 2 years.

## 2018-03-01 NOTE — MR AVS SNAPSHOT
After Visit Summary   3/1/2018    Augustus Mcgrath    MRN: [id number]           Patient Information     Date Of Birth          1965        Visit Information        Provider Department      3/1/2018 8:20 AM Sharlene Baltazar MD [facility]        Today's Diagnoses     Encounter for well woman exam with routine gynecological exam    -  1    Hypertension, goal below 140/90        CARDIOVASCULAR SCREENING; LDL GOAL LESS THAN 160        Iron deficiency anemia due to chronic blood loss        Screen for colon cancer        Screening for malignant neoplasm of cervix          Care Instructions    Please check your blood pressure at home and let me know if it continues to be > 140/90.    Preventive Health Recommendations  Female Ages 50 - 64    Yearly exam: See your health care provider every year in order to  o Review health changes.   o Discuss preventive care.    o Review your medicines if your doctor has prescribed any.      Get a Pap test every three years (unless you have an abnormal result and your provider advises testing more often).    If you get Pap tests with HPV test, you only need to test every 5 years, unless you have an abnormal result.     You do not need a Pap test if your uterus was removed (hysterectomy) and you have not had cancer.    You should be tested each year for STDs (sexually transmitted diseases) if you're at risk.     Have a mammogram every 1 to 2 years.    Have a colonoscopy at age 50, or have a yearly FIT test (stool test). These exams screen for colon cancer.      Have a cholesterol test every 5 years, or more often if advised.    Have a diabetes test (fasting glucose) every three years. If you are at risk for diabetes, you should have this test more often.     If you are at risk for osteoporosis (brittle bone disease), think about having a bone density scan (DEXA).    Shots: Get a flu shot each year. Get a tetanus shot every 10 years.    Nutrition:      Eat at least 5 servings of fruits and vegetables each day.    Eat whole-grain bread, whole-wheat pasta and brown rice instead of white grains and rice.    Talk to your provider about Calcium and Vitamin D.     Lifestyle    Exercise at least 150 minutes a week (30 minutes a day, 5 days a week). This will help you control your weight and prevent disease.    Limit alcohol to one drink per day.    No smoking.     Wear sunscreen to prevent skin cancer.     See your dentist every six months for an exam and cleaning.    See your eye doctor every 1 to 2 years.            Follow-ups after your visit        Additional Services     GASTROENTEROLOGY ADULT REF PROCEDURE ONLY Margie Joseph [phone]; No Provider Preference       Last Lab Result: Creatinine (mg/dL)       Date                     Value                 11/22/2016               0.74             ----------  There is no height or weight on file to calculate BMI.     Needed:  No  Language:  English    Patient will be contacted to schedule procedure.     Please be aware that coverage of these services is subject to the terms and limitations of your health insurance plan.  Call member services at your health plan with any benefit or coverage questions.  Any procedures must be performed at a [facility] facility OR coordinated by your clinic's referral office.    Please bring the following with you to your appointment:    (1) Any X-Rays, CTs or MRIs which have been performed.  Contact the facility where they were done to arrange for  prior to your scheduled appointment.    (2) List of current medications   (3) This referral request   (4) Any documents/labs given to you for this referral                  Who to contact     If you have questions or need follow up information about today's clinic visit or your schedule please contact [facility] directly at [phone].  Normal or non-critical lab and imaging results will be  "communicated to you by [employer], letter or phone within 4 business days after the clinic has received the results. If you do not hear from us within 7 days, please contact the clinic through [employer] or phone. If you have a critical or abnormal lab result, we will notify you by phone as soon as possible.  Submit refill requests through [employer] or call your pharmacy and they will forward the refill request to us. Please allow 3 business days for your refill to be completed.          Additional Information About Your Visit        [employer] Information     [employer] gives you secure access to your electronic health record. If you see a primary care provider, you can also send messages to your care team and make appointments. If you have questions, please call your primary care clinic.  If you do not have a primary care provider, please call [phone] and they will assist you.        Care EveryWhere ID     This is your Care EveryWhere ID. This could be used by other organizations to access your [place] medical records  [id number]        Your Vitals Were     Pulse Temperature Respirations Height Pulse Oximetry BMI (Body Mass Index)    86 98.5  F (36.9  C) (Oral) 16 5' 1\" (1.549 m) 98% 29.48 kg/m2       Blood Pressure from Last 3 Encounters:   03/01/18 (!) 141/93   08/01/17 (!) 156/98   07/19/17 (!) 160/118    Weight from Last 3 Encounters:   03/01/18 156 lb (70.8 kg)   08/01/17 154 lb (69.9 kg)   07/19/17 156 lb (70.8 kg)              We Performed the Following     Albumin Random Urine Quantitative with Creat Ratio     BASIC METABOLIC PANEL     CBC with platelets differential     GASTROENTEROLOGY ADULT REF PROCEDURE ONLY Margie Joseph [phone]; No Provider Preference     HPV High Risk Types DNA Cervical     Iron and iron binding capacity     Pap imaged thin layer screen with HPV - recommended age 30 - 65 years (select HPV order below)          Today's Medication Changes          These changes are accurate as " of 3/1/18  9:15 AM.  If you have any questions, ask your nurse or doctor.               These medicines have changed or have updated prescriptions.        Dose/Directions    lisinopril 20 MG tablet   Commonly known as:  PRINIVIL/ZESTRIL   This may have changed:  See the new instructions.   Used for:  Hypertension, goal below 140/90   Changed by:  Sharlene Baltazar MD        Dose:  20 mg   Take 1 tablet (20 mg) by mouth daily   Quantity:  90 tablet   Refills:  3            Where to get your medicines      These medications were sent to [facility] [place], [place] - [address]  [address], [facility] [address]     Phone:  [phone]     lisinopril 20 MG tablet                Primary Care Provider Office Phone # Fax #    Sharlene Baltazar [phone]       [address]  [facility] [address]        Equal Access to Services     [facility]: Hadii kierra ku hadasho Soomaali, waaxda luqadaha, qaybta kaalmada adeegyada, waxay robinsonin hayderekn kimmy garcia . So [facility] [place] [phone].    ATENCIÓN: Si habla español, tiene a lang disposición servicios gratuitos de asistencia lingüística. Emile al [phone].    We comply with applicable federal civil rights laws and [place] laws. We do not discriminate on the basis of race, color, national origin, age, disability, sex, sexual orientation, or gender identity.            Thank you!     Thank you for choosing [facility]  for your care. Our goal is always to provide you with excellent care. Hearing back from our patients is one way we can continue to improve our services. Please take a few minutes to complete the written survey that you may receive in the mail after your visit with us. Thank you!             Your Updated Medication List - Protect others around you: Learn how to safely use, store and throw away your medicines at www.disposemymeds.org.          This list is accurate as of 3/1/18  9:15 AM.   Always use your most recent med list.                   Brand Name Dispense Instructions for use Diagnosis    lisinopril 20 MG tablet    PRINIVIL/ZESTRIL    90 tablet    Take 1 tablet (20 mg) by mouth daily    Hypertension, goal below 140/90

## 2018-03-06 NOTE — PROGRESS NOTES
Thank you for getting labs done!      You have an iron deficiency anemia.  I recommend taking an iron supplement, 325 mg, by mouth three times per day, with an acid such as orange or tomato juice. Do not take this .at the same time as milk, Tums, or acid-blocking medication. I also recommend trying to increase iron in your diet. Iron-rich foods include beef and other meats, beans, lentils, iron-fortified cereals, dark green leafy vegetables and dried fruit.     Your glucose level, electrolyte level and kidney function, measured with a test called the basic metabolic panel, is normal, which is great news!      Sincerely,  Dr. Sharlene Baltazar MD  3/6/2018

## 2018-03-07 LAB
COPATH REPORT: ABNORMAL
PAP: ABNORMAL

## 2018-03-08 LAB
FINAL DIAGNOSIS: NORMAL
HPV HR 12 DNA CVX QL NAA+PROBE: NEGATIVE
HPV16 DNA SPEC QL NAA+PROBE: NEGATIVE
HPV18 DNA SPEC QL NAA+PROBE: NEGATIVE
SPECIMEN DESCRIPTION: NORMAL
SPECIMEN SOURCE CVX/VAG CYTO: NORMAL

## 2018-03-09 PROBLEM — R87.610 ASCUS OF CERVIX WITH NEGATIVE HIGH RISK HPV: Status: ACTIVE | Noted: 2018-03-01

## 2018-10-02 ENCOUNTER — VIRTUAL VISIT (OUTPATIENT)
Dept: FAMILY MEDICINE | Facility: OTHER | Age: 53
End: 2018-10-02

## 2018-10-02 NOTE — PROGRESS NOTES
"Date:   Clinician: Juliana Hayes  Clinician NPI: [id number]  Patient: Augustus Mcgrath  Patient : 1965  Patient Address: [address] [place] [address]  Patient Phone: [phone]  Visit Protocol: UTI  Patient Summary:  Augustus is a 52 year old ( : 1965 ) female who initiated a Visit for a presumed bladder infection. When asked the question \"Please sign me up to receive news, health information and promotions. \", Augustus responded \"No\".   Her symptoms started today and consist of feeling as if the bladder is never empty, dysuria, and urinary frequency.   Symptom details   Urine color: The color of her urine is yellow.    Denied symptoms include urinary urgency, foul-smelling urine, flank pain, chills, vaginal itching, nausea, urinary incontinence, vaginal discharge, abdominal pain, and vomiting. She does not feel feverish.   Augustus has not used any over-the-counter medications or home remedies to relieve her current symptoms.  Precipitating events  Augustus denies having a sexually transmitted disease.  Pertinent medical history  Augustus has had a bladder infection before and has had 1 in the past 12 months. Her most recent bladder infection was not within the last 4 weeks. Her current symptoms are similar to her previous bladder infection symptoms.   Ciprofloxacin (Cipro) has been effective in treating her past bladder infections.   Augustus has not been prescribed antibiotics to prevent frequent or repeated bladder infections in the past and does not get yeast infections when she takes antibiotics. She has not experienced problems or side effects with any of the common antibiotics used to treat bladder infections.   Augustus does not have a history of kidney stones. She has not used a catheter or been a patient in a hospital or nursing home in the past 2 weeks.   Augustus does not smoke or use smokeless tobacco.   MEDICATIONS: lisinopril oral, ALLERGIES: NKDA  Clinician " Response:  Dear Augustus,  Based on the information you have provided, you likely have an acute urinary tract infection, also called a bladder infection. Bladder infections occur when bacteria from the outside of the body enters the urinary tract. Any part of the urinary system can be infected, but the bladder is the most common.  Medication information  I am prescribing:     Nitrofurantoin monohyd/m-cryst (Macrobid) 100 mg oral capsule. Take 1 capsule by mouth every 12 hours for 5 days. Take this medication with food. There are no refills with this prescription.   The medication I prescribed for your bladder infection is an antibiotic. Continue taking the medication until it is gone even if you feel better.   Yeast infections can be a common side effect of antibiotics. The most common symptom of a yeast infection is itchiness in and around the vagina. Other signs and symptoms include burning, redness, or a thick, white vaginal discharge that looks like cottage cheese and does not have a bad smell.  Self care  Urination helps to flush bacteria from the urinary tract. For this reason, drinking water and urinating often helps relieve some symptoms of a bladder infection and can decrease your risk of getting bladder infections in the future.  Other steps you can take to prevent future bladder infections include:     Wipe front to back after using the bathroom    Urinate after sexual intercourse    Avoid using deodorant sprays, douches, or powders in the vaginal area     When to seek care  Please make an appointment to be seen in a clinic or urgent care if any of the following occur:     You develop new symptoms or your symptoms become worse    You have medication side effects that make it difficult to take them as prescribed    Your symptoms do not improve within 1-2 days of starting treatment    You have symptoms of a bladder infection that return shortly after completing treatment     It is possible to have an allergic  reaction to an antibiotic even if you have not had one in the past. If you notice a new rash, significant swelling, or difficulty breathing, stop taking this medication immediately and go to a clinic or urgent care.   Diagnosis: Acute uncomplicated bladder infection  Diagnosis ICD: N39.0  Prescription: nitrofurantoin monohyd/m-cryst (Macrobid) 100 mg oral capsule 10 capsule, 5 days supply. Take 1 capsule by mouth every 12 hours for 5 days. Refills: 0, Refill as needed: no, Allow substitutions: yes  Pharmacy: [id number] - [phone] - [address] [place] [address]

## 2019-01-02 ENCOUNTER — OFFICE VISIT (OUTPATIENT)
Dept: FAMILY MEDICINE | Facility: CLINIC | Age: 54
End: 2019-01-02
Payer: COMMERCIAL

## 2019-01-02 VITALS
BODY MASS INDEX: 29.95 KG/M2 | DIASTOLIC BLOOD PRESSURE: 88 MMHG | RESPIRATION RATE: 16 BRPM | SYSTOLIC BLOOD PRESSURE: 156 MMHG | OXYGEN SATURATION: 99 % | HEART RATE: 83 BPM | WEIGHT: 158.5 LBS | TEMPERATURE: 98.6 F

## 2019-01-02 DIAGNOSIS — I10 HYPERTENSION, GOAL BELOW 140/90: ICD-10-CM

## 2019-01-02 DIAGNOSIS — R42 VERTIGO: Primary | ICD-10-CM

## 2019-01-02 PROBLEM — G43.109 MIGRAINE WITH AURA: Status: ACTIVE | Noted: 2019-01-02

## 2019-01-02 PROCEDURE — 99214 OFFICE O/P EST MOD 30 MIN: CPT | Performed by: FAMILY MEDICINE

## 2019-01-02 RX ORDER — HYDROCHLOROTHIAZIDE 25 MG/1
25 TABLET ORAL DAILY
Qty: 90 TABLET | Refills: 3 | Status: SHIPPED | OUTPATIENT
Start: 2019-01-02 | End: 2019-09-27 | Stop reason: SINTOL

## 2019-01-02 RX ORDER — MECLIZINE HYDROCHLORIDE 25 MG/1
25 TABLET ORAL 3 TIMES DAILY PRN
Qty: 30 TABLET | Refills: 0 | Status: SHIPPED | OUTPATIENT
Start: 2019-01-02 | End: 2019-09-27

## 2019-01-02 NOTE — PATIENT INSTRUCTIONS
Patient Education     Inner Ear Problems: Causes of Dizziness (Vertigo)       Benign positional vertigo (BPV)  This is the most common cause of vertigo. BPV is also called benign positional paroxysmal vertigo (BPPV). It happens when crystals in the ear canals shift into the wrong place. Vertigo usually occurs when you move your head in a certain way. This can happen when turning in bed, bending, or looking up. Because BPV comes on quickly, you should think about if you are safe to drive or do other tasks that need your full attention.  BPV:    Causes vertigo that last for seconds. Vertigo can occur several times a day, depending on body position.    Doesn t cause hearing loss    Often goes away on its own. But it but may go away sooner with treatment.  Infection or inflammation  Sometimes the semicircular canals swell and send incorrect balance signals. This problem may be caused by a viral infection. Depending on the cause, your hearing can be affected (labyrinthitis). Or your hearing can remain normal (neuronitis).  Infection or inflammation:    Causes vertigo that lasts for hours or days. The first episode is usually the worst.    Can cause hearing loss    Often goes away on its own. But it may go away sooner with treatment.  You may need vestibular rehabilitation if you have balance problems that don't go away.  Meniere s disease  This condition is uncommon. It happens when there is too much fluid in the ear canals. This causes increased pressure and swelling. It affects balance and hearing signals.  Meniere s disease may:    Cause vertigo that last for hours    Cause hearing problems that come and go. The problems are usually in one ear and get worse over time.    Cause buzzing or ringing in the ears (tinnitus)    Cause a feeling of fullness or pressure in the ear    Cause any of these symptoms: vertigo, hearing loss, tinnitus, or ear fullness to last a lifetime  Date Last Reviewed: 11/1/2016 2000-2018 [employer]. [address] [place] [address], [place] [address]. All rights reserved. This information is not intended as a substitute for professional medical care. Always follow your healthcare professional's instructions.         Schedule a lab-only appointment in 2 weeks to check basic metabolic panel.

## 2019-01-02 NOTE — PROGRESS NOTES
SUBJECTIVE:   Augustus Mcgrath is a 53 year old female who presents to clinic today for the following health issues:    Dizziness  Onset: started yesterday morning    Description:   Do you feel faint:  YES- a little bit  Does it feel like the surroundings (bed, room) are moving: YES  Unsteady/off balance: YES  Have you passed out or fallen: no     Intensity: moderate to severe    Progression of Symptoms:  persistent    Accompanying Signs & Symptoms:  Heart palpitations: no   Nausea, vomiting: YES- vomiting after she turned her head  Weakness in arms or legs: no   Fatigue: no   Vision or speech changes: no   Ringing in ears (Tinnitus): no but notes ear fullness  Hearing Loss: no     History:   Head trauma/concussion hx: no   Previous similar symptoms: YES- had vertigo once before this month which lasted for seconds  Recent bleeding history: no     Precipitating factors:   Worse with activity or head movement: YES  Any new medications (BP?): no   Alcohol/drug abuse/withdrawal: no     Alleviating factors:   Does staying in a fixed position give relief:  YES  Therapies Tried and outcome: staying still helps     Onset yesterday morning during sex.  The prior episode a month ago also began during sex.  Vertigo is severe at times - last night she couldn't lie down to sleep without exacerbating her vertigo.   No FHX Meniere's.    Has noted tingling in left leg for past year since starting lisinopril.  She stopped the lisinopril for about a month last summer but tingling persisted and she resumed lisinopril at half dose.      Problem list and histories reviewed & adjusted, as indicated.  Additional history: as documented    BP Readings from Last 3 Encounters:   01/02/19 156/88   03/01/18 (!) 141/93   08/01/17 (!) 156/98    Wt Readings from Last 3 Encounters:   01/02/19 71.9 kg (158 lb 8 oz)   03/01/18 70.8 kg (156 lb)   08/01/17 69.9 kg (154 lb)          Reviewed and updated as needed this visit by clinical  staff  Tobacco  Allergies  Meds  Problems  Med Hx  Surg Hx  Fam Hx  Soc Hx        Reviewed and updated as needed this visit by Provider  Allergies  Meds  Problems  Med Hx  Surg Hx  Fam Hx         ROS:  MS: NEGATIVE for back pain  NEURO: NEGATIVE for focal weakness    OBJECTIVE:     /88   Pulse 83   Temp 98.6  F (37  C) (Oral)   Resp 16   Wt 71.9 kg (158 lb 8 oz)   SpO2 99%   BMI 29.95 kg/m    Body mass index is 29.95 kg/m .  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, right eye with reactive pupil, EOMI with no nystagmus, sclerae and conjunctivae clear with no discharge  ENT: external ears and nose without lesions or scars, right canal with some cerumen and TM normal/grey, right canal clear and TM with some fluid, oropharynx clear with moist mucus membranes and normal landmarks, Madill-Hallpike reproduces vertigo on both sides but right side is markedly worse and patient became nasueated  NECK:  Supple without adenopathy, mass, or thyromegaly  LUNGS:  normal respiratory effort, and lungs clear to auscultation bilaterally - no rales, rhonchi or wheezes  CV: regular rate and rhythm, normal S1 S2, no S3 or S4 and no murmur, click or rub   NEURO:  No focal deficits noted, No facial asymmetry, Equal movement of all 4 extremities, Strength grossly intact, CN II-XII intact other than blind/exotropic left eye, Finger-nose-finger intact, Normal gait including tandem gait      ASSESSMENT/PLAN:     1. Vertigo  Unclear etiology/diagnosis with uncertain prognosis.  No evidence of central cause.  Timing is puzzling - both onset with sex and persistence of symptoms for over a day with no associated symptoms of acute viral illness.  Ddx of peripheral vertigo considered includes a severe case of BPPV, Meniere's Disease, and Vestibular Neuritis.  For now she'll treat with meclizine and monitor symptoms.  If symptoms persist over weekend she'll schedule further eval with  ENT - I gave her referral.     - meclizine (ANTIVERT) 25 MG tablet; Take 1 tablet (25 mg) by mouth 3 times daily as needed for dizziness  Dispense: 30 tablet; Refill: 0  - OTOLARYNGOLOGY REFERRAL    2. Hypertension, goal below 140/90  Poor control in patient who has concerns about lisinopril.  Discussed that I do not think the lisinopril is related to the tingling in her left leg.  I suggested trying a diuretic blood pressure med in the off chance that her vertigo does represent Meniere's.  She'll schedule a lab-only appointment and Medical Assistant (MA)-only appointment in a couple weeks after starting that to monitor BMP and BP.    - hydrochlorothiazide (HYDRODIURIL) 25 MG tablet; Take 1 tablet (25 mg) by mouth daily  Dispense: 90 tablet; Refill: 3  - **Basic metabolic panel FUTURE anytime; Future    Patient Instructions       Patient Education     Inner Ear Problems: Causes of Dizziness (Vertigo)       Benign positional vertigo (BPV)  This is the most common cause of vertigo. BPV is also called benign positional paroxysmal vertigo (BPPV). It happens when crystals in the ear canals shift into the wrong place. Vertigo usually occurs when you move your head in a certain way. This can happen when turning in bed, bending, or looking up. Because BPV comes on quickly, you should think about if you are safe to drive or do other tasks that need your full attention.  BPV:    Causes vertigo that last for seconds. Vertigo can occur several times a day, depending on body position.    Doesn t cause hearing loss    Often goes away on its own. But it but may go away sooner with treatment.  Infection or inflammation  Sometimes the semicircular canals swell and send incorrect balance signals. This problem may be caused by a viral infection. Depending on the cause, your hearing can be affected (labyrinthitis). Or your hearing can remain normal (neuronitis).  Infection or inflammation:    Causes vertigo that lasts for hours or  days. The first episode is usually the worst.    Can cause hearing loss    Often goes away on its own. But it may go away sooner with treatment.  You may need vestibular rehabilitation if you have balance problems that don't go away.  Meniere s disease  This condition is uncommon. It happens when there is too much fluid in the ear canals. This causes increased pressure and swelling. It affects balance and hearing signals.  Meniere s disease may:    Cause vertigo that last for hours    Cause hearing problems that come and go. The problems are usually in one ear and get worse over time.    Cause buzzing or ringing in the ears (tinnitus)    Cause a feeling of fullness or pressure in the ear    Cause any of these symptoms: vertigo, hearing loss, tinnitus, or ear fullness to last a lifetime  Date Last Reviewed: 11/1/2016 2000-2018 "[employer] [address] [place] [address]. All rights reserved. This information is not intended as a substitute for professional medical care. Always follow your healthcare professional's instructions.         Schedule a lab-only appointment in 2 weeks to check basic metabolic panel.        Sia Langston MD  [facility]

## 2019-01-05 ENCOUNTER — VIRTUAL VISIT (OUTPATIENT)
Dept: FAMILY MEDICINE | Facility: OTHER | Age: 54
End: 2019-01-05

## 2019-01-05 NOTE — PROGRESS NOTES
"Date:   Clinician: Javier Vazquez  Clinician NPI: [id number]  Patient: Augustus Mcgrath  Patient : 1965  Patient Address: [address] [place] [address]  Patient Phone: [phone]  Visit Protocol: UTI  Patient Summary:  Augustus is a 53 year old ( : 1965 ) female who initiated a Visit for a presumed bladder infection. When asked the question \"Please sign me up to receive news, health information and promotions. \", Augustus responded \"No\".   Her symptoms started 1-3 days ago and consist of urinary frequency, feeling as if the bladder is never empty, and dysuria.   Symptom details   Urine color: The color of her urine is yellow.    Denied symptoms include urinary incontinence, vaginal discharge, abdominal pain, vomiting, flank pain, chills, vaginal itching, urinary urgency, foul-smelling urine, and nausea. She does not feel feverish.   Augustus has not used any over-the-counter medications or home remedies to relieve her current symptoms.  Precipitating events  Augustus denies having a sexually transmitted disease.  Pertinent medical history  Augustus has had a bladder infection before and has had 1 in the past 12 months. Her most recent bladder infection was not within the last 4 weeks. Her current symptoms are similar to her previous bladder infection symptoms.   She is not sure what antibiotics have been effective in treating her past bladder infections.   Augustus does not get yeast infections when she takes antibiotics and has not been prescribed antibiotics to prevent frequent or repeated bladder infections in the past. She has not experienced problems or side effects with any of the common antibiotics used to treat bladder infections.   Augustus does not have a history of kidney stones. She has not used a catheter or been a patient in a hospital or nursing home in the past 2 weeks.   Augustus does not smoke or use smokeless tobacco.   MEDICATIONS: hydrochlorothiazide oral, ALLERGIES: " NKDA  Clinician Response:  Dear Augustus,  Based on the information you have provided, you likely have an acute urinary tract infection, also called a bladder infection. Bladder infections occur when bacteria from the outside of the body enters the urinary tract. Any part of the urinary system can be infected, but the bladder is the most common.  Medication information  I am prescribing:     Nitrofurantoin monohyd/m-cryst (Macrobid) 100 mg oral capsule. Take 1 capsule by mouth every 12 hours for 5 days. Take this medication with food. There are no refills with this prescription.   The medication I prescribed for your bladder infection is an antibiotic. Continue taking the medication until it is gone even if you feel better.   Yeast infections can be a common side effect of antibiotics. The most common symptom of a yeast infection is itchiness in and around the vagina. Other signs and symptoms include burning, redness, or a thick, white vaginal discharge that looks like cottage cheese and does not have a bad smell.  Self care  Urination helps to flush bacteria from the urinary tract. For this reason, drinking water and urinating often helps relieve some symptoms of a bladder infection and can decrease your risk of getting bladder infections in the future.  Other steps you can take to prevent future bladder infections include:     Wipe front to back after using the bathroom    Urinate after sexual intercourse    Avoid using deodorant sprays, douches, or powders in the vaginal area     When to seek care  Please make an appointment to be seen in a clinic or urgent care if any of the following occur:     You develop new symptoms or your symptoms become worse    You have medication side effects that make it difficult to take them as prescribed    Your symptoms do not improve within 1-2 days of starting treatment    You have symptoms of a bladder infection that return shortly after completing treatment     It is possible to  have an allergic reaction to an antibiotic even if you have not had one in the past. If you notice a new rash, significant swelling, or difficulty breathing, stop taking this medication immediately and go to a clinic or urgent care.   Diagnosis: Acute uncomplicated bladder infection  Diagnosis ICD: N39.0  Prescription: nitrofurantoin monohyd/m-cryst (Macrobid) 100 mg oral capsule 10 capsule, 5 days supply. Take 1 capsule by mouth every 12 hours for 5 days. Refills: 0, Refill as needed: no, Allow substitutions: yes  Pharmacy: [facility] [id number] - [phone] - [address] [place] [address]

## 2019-01-07 ENCOUNTER — TELEPHONE (OUTPATIENT)
Dept: OTOLARYNGOLOGY | Facility: CLINIC | Age: 54
End: 2019-01-07

## 2019-01-09 ENCOUNTER — TRANSFERRED RECORDS (OUTPATIENT)
Dept: HEALTH INFORMATION MANAGEMENT | Facility: CLINIC | Age: 54
End: 2019-01-09

## 2019-03-04 ENCOUNTER — APPOINTMENT (OUTPATIENT)
Dept: GENERAL RADIOLOGY | Facility: CLINIC | Age: 54
End: 2019-03-04
Attending: EMERGENCY MEDICINE
Payer: COMMERCIAL

## 2019-03-04 ENCOUNTER — HOSPITAL ENCOUNTER (EMERGENCY)
Facility: CLINIC | Age: 54
Discharge: HOME | End: 2019-03-04
Attending: EMERGENCY MEDICINE | Admitting: EMERGENCY MEDICINE
Payer: COMMERCIAL

## 2019-03-04 VITALS
TEMPERATURE: 98.6 F | HEIGHT: 61 IN | DIASTOLIC BLOOD PRESSURE: 98 MMHG | RESPIRATION RATE: 16 BRPM | OXYGEN SATURATION: 99 % | BODY MASS INDEX: 28.32 KG/M2 | WEIGHT: 150 LBS | SYSTOLIC BLOOD PRESSURE: 153 MMHG

## 2019-03-04 DIAGNOSIS — S52.502A CLOSED FRACTURE OF DISTAL END OF LEFT RADIUS, UNSPECIFIED FRACTURE MORPHOLOGY, INITIAL ENCOUNTER: ICD-10-CM

## 2019-03-04 PROCEDURE — 25000132 ZZH RX MED GY IP 250 OP 250 PS 637: Performed by: EMERGENCY MEDICINE

## 2019-03-04 PROCEDURE — 40000986 XR WRIST LEFT 2 VIEW: Mod: LT

## 2019-03-04 PROCEDURE — 25605 CLTX DST RDL FX/EPHYS SEP W/: CPT | Mod: LT

## 2019-03-04 PROCEDURE — 73080 X-RAY EXAM OF ELBOW: CPT | Mod: LT

## 2019-03-04 PROCEDURE — 99285 EMERGENCY DEPT VISIT HI MDM: CPT | Mod: 25

## 2019-03-04 PROCEDURE — 73110 X-RAY EXAM OF WRIST: CPT | Mod: LT

## 2019-03-04 RX ORDER — ACETAMINOPHEN 500 MG
1000 TABLET ORAL ONCE
Status: COMPLETED | OUTPATIENT
Start: 2019-03-04 | End: 2019-03-04

## 2019-03-04 RX ORDER — OXYCODONE HYDROCHLORIDE 5 MG/1
5 TABLET ORAL
Qty: 6 TABLET | Refills: 0 | Status: SHIPPED | OUTPATIENT
Start: 2019-03-04 | End: 2019-03-08

## 2019-03-04 RX ORDER — IBUPROFEN 400 MG/1
400 TABLET, FILM COATED ORAL ONCE
Status: DISCONTINUED | OUTPATIENT
Start: 2019-03-04 | End: 2019-03-04 | Stop reason: HOSPADM

## 2019-03-04 RX ORDER — OXYCODONE HYDROCHLORIDE 5 MG/1
10 TABLET ORAL ONCE
Status: DISCONTINUED | OUTPATIENT
Start: 2019-03-04 | End: 2019-03-04 | Stop reason: HOSPADM

## 2019-03-04 RX ADMIN — ACETAMINOPHEN 1000 MG: 500 TABLET, FILM COATED ORAL at 09:03

## 2019-03-04 ASSESSMENT — ENCOUNTER SYMPTOMS
JOINT SWELLING: 1
ARTHRALGIAS: 1
HEADACHES: 0

## 2019-03-04 ASSESSMENT — MIFFLIN-ST. JEOR: SCORE: 1232.78

## 2019-03-04 NOTE — ED PROVIDER NOTES
"  History     Chief Complaint:  Fall     HPI   Augustus Mcgrath is a right handed 53 year old female who presents to the emergency department for evaluation of a left wrist pain secondary to FOOSH mechanism of injury. Patient states she fell at around 0735 hours and since then she has been unable to use her wrist secondary to pain. She notes her pain seems to be decreasing but she believes it is due to the area going numb. She denies any headache, syncope, shoulder, arm or elbow pain.     Allergies:  No known Drug Allergies     Medications:    hydrochlorothiazide (HYDRODIURIL) 25 MG tablet  meclizine (ANTIVERT) 25 MG tablet    Past Medical History:    ASCUS of cervix with negative high risk HPV  Contraception   Eye problems  Hypertension  Iron deficiency anemia due to chronic blood loss     Past Surgical History:    Retinal surgery  Vitrectomy anterior     Family History:    Mother- hypertension, heart disease, heart failure  Father- heart disease, hyperlipidemia  Maternal grandfather- CV disease  Paternal grandfather- CV disease  Sister- hypothyroid, breast cancer, uterine cancer, hypertension, thyroid cancer    Social History:  Marital Status:   [2]  Social History     Tobacco Use     Smoking status: Former Smoker     Packs/day: 0.50     Years: 17.00     Pack years: 8.50     Types: Cigarettes     Last attempt to quit: 11/10/2010     Years since quittin.3     Smokeless tobacco: Never Used     Tobacco comment: occ   Substance Use Topics     Alcohol use: Yes     Comment: 5 drinks a week      Drug use: No        Review of Systems   Musculoskeletal: Positive for arthralgias and joint swelling.   Neurological: Negative for syncope and headaches.   All other systems reviewed and are negative.      Physical Exam   First Vitals:  BP: (!) 154/120  Heart Rate: 67  Temp: 98.6  F (37  C)  Resp: 16  Height: 154.9 cm (5' 1\")  Weight: 68 kg (150 lb)  SpO2: 99 %      Physical Exam  Constitutional: vitals " reviewed  HENT: Moist oral mucosa.   Eyes: Grossly normal vision. Pupils are equal and round. Extraocular movements intact.  Sclera clear and without icterus.   Cardiovascular: Normal rate. Regular rhythm. 2+ radial pulses. Normal capillary refill.  Respiratory: Effort normal. Symmetric chest rise  Gastrointestinal: Soft. No distension.  Neurologic: Alert and awake. Coordinated movement of extremities. Speech normal. Sensation to light touch intact in median, radial, ulnar nerve distributions of left hand.   Skin: No diaphoresis, rashes, ecchymoses, or lesions.  Musculoskeletal: Deformity and swelling to the left wrist area is noted that is most significant along the ulnar aspect of the wrist.  Tenderness along the dorsum of the distal radius with a step-off that is palpable in this region.  Active range of motion of the hand along the median, ulnar, radial nerve distributions is intact.  Unable to range at the wrist due to pain.  Full range of motion at the elbow.  There is some tenderness at the radial head.  Full range of motion of the shoulder without pain.  Psychiatric: Appropriate affect. Behavior is normal. Intact recent and remote memory. Linear thought process.    Emergency Department Course     Imaging:  Radiology findings were communicated with the patient who voiced understanding of the findings.    XR Wrist Left G/E 3 Views  Final Result  IMPRESSION: Three views of the left wrist are performed. A mildly  comminuted, impacted distal radius fracture is noted. Ulna appears  intact. Soft tissue swelling is noted along the ulnar aspect of the  distal ulnar metadiaphysis likely a small hematoma. There is slight  dorsal angulation of the distal radial fracture fragment on the  lateral view.  MANNY CHAPMAN MD    XR Elbow Left G/E 3 Views  Final Result  IMPRESSION: Three views left elbow. No fracture or dislocation. No  significant soft tissue swelling. No fat pad elevation.  MANNY CHAPMAN MD    Reading per  radiology     Procedures:   Splint Placement    PLACEMENT: Plaster sugar tong splint was applied to the left upper extremity and after placement I checked and adjusted the fit to ensure proper positioning. The patient was more comfortable with the splint in place. Sensation and circulation are intact after splint placement.          Procedure Note: Reduction of Fractured distal left radius  Physician: Hermes Aviles  Diagnosis: left distal radius fracture  Consent: Informed written, see paper chart  Description of Procedure: Patient positioned in supine position. Placed in finger traps with 15 lbs weight and arm at 90 degrees flexion at elbow. Held for 15 minutes. Splint then applied and further anatomic alignment achieved with direct pressure The neurovascular exam was normal before and after reduction attempt.  The patient tolerated the procedure well, there were no complications.      Procedure note: Hematoma Block    Procedures Performed by: Dr. Aviles    Indication:  Anesthesia for fracture management  Location: left wrist    The patient's wrist, hand, and forearm were prepped with betadiene.  A sterile field is created. 0.25% Bupivicaine without epi was instilled at the fracture site after aspirating blood.  Total of 9mL placed.      There were no complications to the procedure.          Interventions:  Acetaminophen 1000 mg PO     Emergency Department Course:  Nursing notes and vitals reviewed.  I performed an exam of the patient as documented above.     I discussed the treatment plan with the patient. They expressed understanding of this plan and consented to discharge. They will be discharged home with instructions for care and follow up. In addition, the patient will return to the emergency department if their symptoms persist, worsen, if new symptoms arise or if there is any concern.  All questions were answered.    I personally reviewed the imaging results with the Patient and answered all related  questions prior to discharge.  Impression & Plan      Medical Decision Making:  Patient presents with pain and deformity to the distal left wrist.  There was clinical concern for distal radius fracture and questionable concern for a radial head injury based on her exam.  Distally, she is neurovascularly intact.  X-ray confirms a diagnosis of distal radius fracture with some displacement and impaction.  Elbow x-ray is unremarkable.  After hematoma block, the patient was hung in finger traps to assist in reduction.  After splint placement, further molding was done.  Repeat x-ray shows improved alignment.  Patient had repeat neurovascular exam that remained intact.  A sling was provided.  Follow-up with orthopedics in 2-3 days.  Short prescription for oxycodone provided for breakthrough pain.  Patient left the emergency department in improved condition.    Diagnosis:    ICD-10-CM    1. Closed fracture of distal end of left radius, unspecified fracture morphology, initial encounter S52.502A      Disposition:   Discharged     Discharge Medications:  START taking      Dose / Directions   oxyCODONE 5 MG tablet  Commonly known as:  ROXICODONE      Dose:  5 mg  Take 1 tablet (5 mg) by mouth nightly as needed for pain  Quantity:  6 tablet  Refills:  0           Where to get your medicines      Some of these will need a paper prescription and others can be bought over the counter. Ask your nurse if you have questions.    Bring a paper prescription for each of these medications    oxyCODONE 5 MG tablet         Scribe Disclosure:  Arnol MCKEON, am serving as a scribe at 11:46 AM on 3/4/2019 to document services personally performed by Hermes Aviles MD, based on my observations and the provider's statements to me.      EMERGENCY DEPARTMENT       Hermes Aviles MD  03/04/19 [id number]

## 2019-03-04 NOTE — ED AVS SNAPSHOT
Emergency Department  [address] [place] [id number]  Phone:  [phone]  Fax:  [phone]                                    Augustus Mcgrath   MRN: [id number]    Department:   Emergency Department   Date of Visit:  3/4/2019           After Visit Summary Signature Page    I have received my discharge instructions, and my questions have been answered. I have discussed any challenges I see with this plan with the nurse or doctor.    ..........................................................................................................................................  Patient/Patient Representative Signature      ..........................................................................................................................................  Patient Representative Print Name and Relationship to Patient    ..................................................               ................................................  Date                                   Time    ..........................................................................................................................................  Reviewed by Signature/Title    ...................................................              ..............................................  Date                                               Time          22EPIC Rev 08/18

## 2019-03-04 NOTE — DISCHARGE INSTRUCTIONS
Discharge Instructions  Splint Care    You had a splint put on today to help protect your injury and help it heal.  Splints are used to treat things like strains, sprains, large cuts, and fractures (broken bones). Splints are temporary and are designed to allow for swelling.    Be sure your splint is not too tight!  If you splint is too tight, it may cause loss of blood supply.  Signs of your splint being too tight include: your arm or leg hurting a lot more; your fingers or toes getting numb, cold, pale or blue; or your child is crying, fussing or seeming restless.    Generally, every Emergency Department visit should have a follow-up clinic visit with either a primary or a specialty clinic/provider. Please follow-up as instructed by your emergency provider today.  Return to the Emergency Department right away if:  You have increased pain or pressure around the injury.  You have numbness, tingling, or cool, pale, or blue toes or fingers past the injury.  Your child is more fussy than normal, crying a lot, or restless.  Your splint becomes soft, breaks, or is wet.  Your splint begins to smell bad.  Your splint is cutting into your skin.    Home care:  Keep the injured area above the level of your heart while laying or sitting down.  This will help decrease the swelling and the pain.  Keep the splint dry.  Do not put objects down or inside the splint.  If there is an elastic bandage (Ace  wrap) holding the splint on this may be loosened slightly to relieve pressure or pain.  If pain continues return to the Emergency Department right away.  Do not remove your splint by yourself unless told to by your provider.    Follow-up:  Sometimes the splint put on in the Emergency Department needs to be changed once the swelling has gone down and a more permanent cast needs to be placed.  This is usually done by a bone specialist provider (Orthopedist).  Follow the instructions given to you by your provider today.    If you were  given a prescription for medicine here today, be sure to read all of the information (including the package insert) that comes with your prescription.  This will include important information about the medicine, its side effects, and any warnings that you need to know about.  The pharmacist who fills the prescription can provide more information and answer questions you may have about the medicine.  If you have questions or concerns that the pharmacist cannot address, please call or return to the Emergency Department.     Remember that you can always come back to the Emergency Department if you are not able to see your regular provider in the amount of time listed above, if you get any new symptoms, or if there is anything that worries you.

## 2019-04-07 ASSESSMENT — ENCOUNTER SYMPTOMS
SHORTNESS OF BREATH: 0
BREAST MASS: 0
FEVER: 0
CHILLS: 0
WEAKNESS: 0
ARTHRALGIAS: 0
MYALGIAS: 0
NERVOUS/ANXIOUS: 0
DIARRHEA: 0
CONSTIPATION: 0
HEADACHES: 0
HEARTBURN: 0
DYSURIA: 0
JOINT SWELLING: 0
DIZZINESS: 0
FREQUENCY: 0
PARESTHESIAS: 0
ABDOMINAL PAIN: 0
SORE THROAT: 0
NAUSEA: 0
PALPITATIONS: 0
COUGH: 0
HEMATOCHEZIA: 0
HEMATURIA: 0
EYE PAIN: 0

## 2019-04-10 ASSESSMENT — ENCOUNTER SYMPTOMS
CHILLS: 0
DIZZINESS: 0
SHORTNESS OF BREATH: 0
PALPITATIONS: 0
SORE THROAT: 0
HEADACHES: 0
FREQUENCY: 0
COUGH: 0
MYALGIAS: 0
HEARTBURN: 0
JOINT SWELLING: 0
CONSTIPATION: 0
WEAKNESS: 0
EYE PAIN: 0
ARTHRALGIAS: 0
NERVOUS/ANXIOUS: 0
FEVER: 0
DIARRHEA: 0
PARESTHESIAS: 0
ABDOMINAL PAIN: 0
NAUSEA: 0
BREAST MASS: 0
HEMATOCHEZIA: 0
DYSURIA: 0
HEMATURIA: 0

## 2019-04-10 NOTE — PROGRESS NOTES
SUBJECTIVE:   CC: Augustus Mcgrath is an 53 year old woman who presents for preventive health visit.     Healthy Habits:     Getting at least 3 servings of Calcium per day:  Yes    Bi-annual eye exam:  Yes    Dental care twice a year:  Yes    Sleep apnea or symptoms of sleep apnea:  None    Diet:  Regular (no restrictions)    Frequency of exercise:  2-3 days/week    Duration of exercise:  15-30 minutes    Taking medications regularly:  Yes    Medication side effects:  None    PHQ-2 Total Score: 0    Additional concerns today:  No      Hypertension Follow-up      Outpatient blood pressures are being checked at home.  Results are 115/85 at home; when home on the weekend  BP Readings from Last 3 Encounters:   19 (!) 154/104   19 (!) 153/98   19 156/88      .    Low Salt Diet: low salt    She has been taking hydrochlorothiazide every morning    Iron Deficiency anemia  Prior hx of anemia related to menorrhagia, but no longer menstruating regularly, hasn't had menses in 4 months, is having some hot flashes, otherwise sleeping well. She has tried soy milk for hot flashes but has caused stomach upset, she's wondering about treatment options.    Today's PHQ-2 Score:   PHQ-2 (  [employer]) 2019   Q1: Little interest or pleasure in doing things 0   Q2: Feeling down, depressed or hopeless 0   PHQ-2 Score 0   Q1: Little interest or pleasure in doing things Not at all   Q2: Feeling down, depressed or hopeless Not at all   PHQ-2 Score 0       Abuse: Current or Past(Physical, Sexual or Emotional)- No  Do you feel safe in your environment? Yes    Social History     Tobacco Use     Smoking status: Former Smoker     Packs/day: 0.50     Years: 17.00     Pack years: 8.50     Types: Cigarettes     Last attempt to quit: 11/10/2010     Years since quittin.4     Smokeless tobacco: Never Used     Tobacco comment: occ   Substance Use Topics     Alcohol use: Yes     Comment: 5 drinks a week          Alcohol Use  2019   Prescreen: >3 drinks/day or >7 drinks/week? No   Prescreen: >3 drinks/day or >7 drinks/week? -       Reviewed orders with patient.  Reviewed health maintenance and updated orders accordingly - Yes  BP Readings from Last 3 Encounters:   19 (!) 154/104   19 (!) 153/98   19 156/88    Wt Readings from Last 3 Encounters:   19 68.5 kg (151 lb)   19 68 kg (150 lb)   19 71.9 kg (158 lb 8 oz)                  Patient Active Problem List   Diagnosis     Menorrhagia     CARDIOVASCULAR SCREENING; LDL GOAL LESS THAN 160     Enthesopathy     Need for hepatitis C screening test     Iron deficiency anemia due to chronic blood loss     Hypertension, goal below 140/90     ASCUS of cervix with negative high risk HPV     Migraine with aura     Past Surgical History:   Procedure Laterality Date     retinal surgery      scleral buckle and several lasers both eyes     VITRECTOMY ANTERIOR  13    -[facility]       Social History     Tobacco Use     Smoking status: Former Smoker     Packs/day: 0.50     Years: 17.00     Pack years: 8.50     Types: Cigarettes     Last attempt to quit: 11/10/2010     Years since quittin.4     Smokeless tobacco: Never Used     Tobacco comment: occ   Substance Use Topics     Alcohol use: Yes     Comment: 5 drinks a week      Family History   Problem Relation Age of Onset     Hypertension Mother      Heart Disease Mother 85     Heart Failure Mother          of renal failure related to cardiac testing     Lipids Father      Heart Disease Father 85         suddenly while doing yardwork     Hyperlipidemia Father      Cerebrovascular Disease Maternal Grandfather      Cerebrovascular Disease Paternal Grandfather      Thyroid Disease Sister         hypo     Breast Cancer Sister 53     Uterine Cancer Sister 53     Hypertension Sister      Hypertension Sister      Other Cancer Sister      Thyroid Disease Sister      Breast Cancer Sister           Allergies   Allergen Reactions     Nkda [No Known Drug Allergies]      Recent Labs   Lab Test 18  0920 16  0854   LDL  --  110*   HDL  --  48*   TRIG  --  148   CR 0.65 0.74   GFRESTIMATED >90 84   GFRESTBLACK >90 >90  African American GFR Calc     POTASSIUM 3.9 4.5        Mammogram Screening: Patient over age 50, mutual decision to screen reflected in health maintenance.    Pertinent mammograms are reviewed under the imaging tab.  History of abnormal Pap smear:   NO - age 30- 65 PAP every 3 years recommended  Last 3 Pap Results:   PAP (no units)   Date Value   2018 ASC-US (A)   07/10/2013 NIL   2009 ASC-US (A)     PAP / HPV Latest Ref Rng & Units 3/1/2018 7/10/2013 2009   PAP - ASC-US(A) NIL ASC-US(A)   HPV 16 DNA NEG:Negative Negative - -   HPV 18 DNA NEG:Negative Negative - -   OTHER HR HPV NEG:Negative Negative - -     Reviewed and updated as needed this visit by clinical staff         Reviewed and updated as needed this visit by Provider  Problems        Past Medical History:   Diagnosis Date     ASCUS of cervix with negative high risk HPV 2018    3/1/18 ASCUS, Neg HPV     Contraception      has vasectomy     Eye problems     right eye, retinal detachment, LEFT EY PROBLEMATIC AS WELL     Hypertension      Iron deficiency anemia due to chronic blood loss 2016      Past Surgical History:   Procedure Laterality Date     retinal surgery      scleral buckle and several lasers both eyes     VITRECTOMY ANTERIOR  13    -[facility]     OB History    Para Term  AB Living   1 0 0 0 1 0   SAB TAB Ectopic Multiple Live Births   0 1 0 0 0      # Outcome Date GA Lbr Tono/2nd Weight Sex Delivery Anes PTL Lv   1 TAB                Review of Systems   Constitutional: Negative for chills and fever.   HENT: Negative for congestion, ear pain, hearing loss and sore throat.    Eyes: Negative for pain.   Respiratory: Negative for cough and  "shortness of breath.    Cardiovascular: Negative for chest pain, palpitations and peripheral edema.   Gastrointestinal: Negative for abdominal pain, constipation, diarrhea, heartburn, hematochezia and nausea.   Breasts:  Negative for tenderness, breast mass and discharge.   Genitourinary: Negative for dysuria, frequency, genital sores, hematuria, pelvic pain, urgency, vaginal bleeding and vaginal discharge.   Musculoskeletal: Negative for arthralgias, joint swelling and myalgias.   Skin: Negative for rash.   Neurological: Negative for dizziness, weakness, headaches and paresthesias.   Psychiatric/Behavioral: Negative for mood changes. The patient is not nervous/anxious.      CONSTITUTIONAL: NEGATIVE for fever, chills, change in weight  INTEGUMENTARY/SKIN: NEGATIVE for worrisome rashes, moles or lesions  EYES: NEGATIVE for vision changes or irritation  ENT: NEGATIVE for ear, mouth and throat problems  RESP: NEGATIVE for significant cough or SOB  BREAST: NEGATIVE for masses, tenderness or discharge  CV: NEGATIVE for chest pain, palpitations or peripheral edema  GI: NEGATIVE for nausea, abdominal pain, heartburn, or change in bowel habits  : NEGATIVE for unusual urinary or vaginal symptoms. No vaginal bleeding.  MUSCULOSKELETAL: NEGATIVE for significant arthralgias or myalgia  NEURO: NEGATIVE for weakness, dizziness or paresthesias  ENDOCRINE: NEGATIVE for temperature intolerance, skin/hair changes  HEME/ALLERGY/IMMUNE: NEGATIVE for bleeding problems  PSYCHIATRIC: NEGATIVE for changes in mood or affect      OBJECTIVE:   BP (!) 154/104 (BP Location: Right arm, Patient Position: Chair, Cuff Size: Adult Regular)   Pulse 82   Temp 98.3  F (36.8  C) (Oral)   Resp 16   Ht 1.549 m (5' 1\")   Wt 68.5 kg (151 lb)   SpO2 97%   BMI 28.53 kg/m    Physical Exam  GENERAL: healthy, alert and no distress  EYES: Eyes grossly normal to inspection, PERRL and conjunctivae and sclerae normal  HENT: ear canals and TM's normal, nose " and mouth without ulcers or lesions  NECK: no adenopathy, no asymmetry, masses, or scars and thyroid normal to palpation  RESP: lungs clear to auscultation - no rales, rhonchi or wheezes  CV: regular rate and rhythm, normal S1 S2, no S3 or S4, no murmur, click or rub, no peripheral edema and peripheral pulses strong  ABDOMEN: soft, nontender, no hepatosplenomegaly, no masses and bowel sounds normal  MS: no gross musculoskeletal defects noted, no edema  SKIN: no suspicious lesions or rashes  NEURO: Normal strength and tone, mentation intact and speech normal  PSYCH: mentation appears normal, affect normal/bright    ASSESSMENT/PLAN:   1. Routine general medical examination at a health care facility  routine    2. Iron deficiency anemia due to chronic blood loss  Hemoglobin   Date Value Ref Range Status   03/01/2018 10.7 (L) 11.7 - 15.7 g/dL Final   11/22/2016 9.5 (L) 11.7 - 15.7 g/dL Final   06/17/2013 10.6 (L) 11.7 - 15.7 g/dL Final   11/02/2009 8.7 (L) 11.7 - 15.7 g/dL Final     Previously due to menometrorrhagia but no menses in 4 months, will recheck today  - CBC with platelets differential  - Iron and iron binding capacity    3. Encounter for screening for HIV  - HIV Antigen Antibody Combo    4. Hypertension, goal below 140/90  BP Readings from Last 3 Encounters:   04/11/19 (!) 154/104   03/04/19 (!) 153/98   01/02/19 156/88    not at goal adding medication today given elevated sbp and dbp, but she really feels she can make some lifestyle changes, exercise more, lose weight  Strongly encouraged follow up 4 weeks  - Albumin Random Urine Quantitative with Creat Ratio  - Comprehensive metabolic panel    5. Special screening for malignant neoplasms, colon  - GASTROENTEROLOGY ADULT REF PROCEDURE ONLY [facility] [phone]    COUNSELING:  Reviewed preventive health counseling, as reflected in patient instructions       Regular exercise       Healthy diet/nutrition       Colon cancer screening       HIV  "screeninx in teen years, 1x in adult years, and at intervals if high risk    BP Readings from Last 1 Encounters:   19 (!) 154/104     Estimated body mass index is 28.53 kg/m  as calculated from the following:    Height as of this encounter: 1.549 m (5' 1\").    Weight as of this encounter: 68.5 kg (151 lb).    Weight management plan: Discussed healthy diet and exercise guidelines     reports that she quit smoking about 8 years ago. Her smoking use included cigarettes. She has a 8.50 pack-year smoking history. She has never used smokeless tobacco.    Counseling Resources:  ATP IV Guidelines  Pooled Cohorts Equation Calculator  Breast Cancer Risk Calculator  FRAX Risk Assessment  ICSI Preventive Guidelines  Dietary Guidelines for Americans, 2010  USDA's MyPlate  ASA Prophylaxis  Lung CA Screening    Sharlene Baltazar MD  [facility]  "

## 2019-04-10 NOTE — PATIENT INSTRUCTIONS
You are due for a Mammography at [facility]   Appointment line [phone]    Please check your blood pressure regularly and let me know if it's still > 140/90    Preventive Health Recommendations  Female Ages 50 - 64    Yearly exam: See your health care provider every year in order to  o Review health changes.   o Discuss preventive care.    o Review your medicines if your doctor has prescribed any.      Get a Pap test every three years (unless you have an abnormal result and your provider advises testing more often).    If you get Pap tests with HPV test, you only need to test every 5 years, unless you have an abnormal result.     You do not need a Pap test if your uterus was removed (hysterectomy) and you have not had cancer.    You should be tested each year for STDs (sexually transmitted diseases) if you're at risk.     Have a mammogram every 1 to 2 years.    Have a colonoscopy at age 50, or have a yearly FIT test (stool test). These exams screen for colon cancer.      Have a cholesterol test every 5 years, or more often if advised.    Have a diabetes test (fasting glucose) every three years. If you are at risk for diabetes, you should have this test more often.     If you are at risk for osteoporosis (brittle bone disease), think about having a bone density scan (DEXA).    Shots: Get a flu shot each year. Get a tetanus shot every 10 years.    Nutrition:     Eat at least 5 servings of fruits and vegetables each day.    Eat whole-grain bread, whole-wheat pasta and brown rice instead of white grains and rice.    Get adequate Calcium and Vitamin D.     Lifestyle    Exercise at least 150 minutes a week (30 minutes a day, 5 days a week). This will help you control your weight and prevent disease.    Limit alcohol to one drink per day.    No smoking.     Wear sunscreen to prevent skin cancer.     See your dentist every six months for an exam and cleaning.    See your eye doctor every 1 to 2  years.

## 2019-04-11 ENCOUNTER — OFFICE VISIT (OUTPATIENT)
Dept: FAMILY MEDICINE | Facility: CLINIC | Age: 54
End: 2019-04-11
Payer: COMMERCIAL

## 2019-04-11 VITALS
HEIGHT: 61 IN | RESPIRATION RATE: 16 BRPM | SYSTOLIC BLOOD PRESSURE: 154 MMHG | HEART RATE: 82 BPM | WEIGHT: 151 LBS | BODY MASS INDEX: 28.51 KG/M2 | TEMPERATURE: 98.3 F | OXYGEN SATURATION: 97 % | DIASTOLIC BLOOD PRESSURE: 104 MMHG

## 2019-04-11 DIAGNOSIS — D50.0 IRON DEFICIENCY ANEMIA DUE TO CHRONIC BLOOD LOSS: Primary | ICD-10-CM

## 2019-04-11 DIAGNOSIS — Z00.00 ROUTINE GENERAL MEDICAL EXAMINATION AT A HEALTH CARE FACILITY: Primary | ICD-10-CM

## 2019-04-11 DIAGNOSIS — Z11.4 ENCOUNTER FOR SCREENING FOR HIV: ICD-10-CM

## 2019-04-11 DIAGNOSIS — I10 HYPERTENSION, GOAL BELOW 140/90: ICD-10-CM

## 2019-04-11 DIAGNOSIS — D50.0 IRON DEFICIENCY ANEMIA DUE TO CHRONIC BLOOD LOSS: ICD-10-CM

## 2019-04-11 DIAGNOSIS — Z12.11 SPECIAL SCREENING FOR MALIGNANT NEOPLASMS, COLON: ICD-10-CM

## 2019-04-11 LAB
ALBUMIN SERPL-MCNC: 3.9 G/DL (ref 3.4–5)
ALP SERPL-CCNC: 82 U/L (ref 40–150)
ALT SERPL W P-5'-P-CCNC: 34 U/L (ref 0–50)
ANION GAP SERPL CALCULATED.3IONS-SCNC: 6 MMOL/L (ref 3–14)
AST SERPL W P-5'-P-CCNC: 18 U/L (ref 0–45)
BASOPHILS # BLD AUTO: 0 10E9/L (ref 0–0.2)
BASOPHILS NFR BLD AUTO: 0.3 %
BILIRUB SERPL-MCNC: 0.3 MG/DL (ref 0.2–1.3)
BUN SERPL-MCNC: 18 MG/DL (ref 7–30)
CALCIUM SERPL-MCNC: 10.7 MG/DL (ref 8.5–10.1)
CHLORIDE SERPL-SCNC: 109 MMOL/L (ref 94–109)
CO2 SERPL-SCNC: 27 MMOL/L (ref 20–32)
CREAT SERPL-MCNC: 0.65 MG/DL (ref 0.52–1.04)
CREAT UR-MCNC: 79 MG/DL
DIFFERENTIAL METHOD BLD: ABNORMAL
EOSINOPHIL # BLD AUTO: 0.1 10E9/L (ref 0–0.7)
EOSINOPHIL NFR BLD AUTO: 2.1 %
ERYTHROCYTE [DISTWIDTH] IN BLOOD BY AUTOMATED COUNT: 22.7 % (ref 10–15)
GFR SERPL CREATININE-BSD FRML MDRD: >90 ML/MIN/{1.73_M2}
GLUCOSE SERPL-MCNC: 89 MG/DL (ref 70–99)
HCT VFR BLD AUTO: 41.4 % (ref 35–47)
HGB BLD-MCNC: 12.4 G/DL (ref 11.7–15.7)
HIV 1+2 AB+HIV1 P24 AG SERPL QL IA: NONREACTIVE
IRON SATN MFR SERPL: 12 % (ref 15–46)
IRON SERPL-MCNC: 53 UG/DL (ref 35–180)
LYMPHOCYTES # BLD AUTO: 1.8 10E9/L (ref 0.8–5.3)
LYMPHOCYTES NFR BLD AUTO: 27.3 %
MCH RBC QN AUTO: 22.1 PG (ref 26.5–33)
MCHC RBC AUTO-ENTMCNC: 30 G/DL (ref 31.5–36.5)
MCV RBC AUTO: 74 FL (ref 78–100)
MICROALBUMIN UR-MCNC: 8 MG/L
MICROALBUMIN/CREAT UR: 10.39 MG/G CR (ref 0–25)
MONOCYTES # BLD AUTO: 0.6 10E9/L (ref 0–1.3)
MONOCYTES NFR BLD AUTO: 8.5 %
NEUTROPHILS # BLD AUTO: 4.2 10E9/L (ref 1.6–8.3)
NEUTROPHILS NFR BLD AUTO: 61.8 %
PLATELET # BLD AUTO: 273 10E9/L (ref 150–450)
POTASSIUM SERPL-SCNC: 3.6 MMOL/L (ref 3.4–5.3)
PROT SERPL-MCNC: 8.1 G/DL (ref 6.8–8.8)
RBC # BLD AUTO: 5.62 10E12/L (ref 3.8–5.2)
SODIUM SERPL-SCNC: 142 MMOL/L (ref 133–144)
TIBC SERPL-MCNC: 454 UG/DL (ref 240–430)
WBC # BLD AUTO: 6.7 10E9/L (ref 4–11)

## 2019-04-11 PROCEDURE — 83540 ASSAY OF IRON: CPT | Performed by: FAMILY MEDICINE

## 2019-04-11 PROCEDURE — 80053 COMPREHEN METABOLIC PANEL: CPT | Performed by: FAMILY MEDICINE

## 2019-04-11 PROCEDURE — 99213 OFFICE O/P EST LOW 20 MIN: CPT | Mod: 25 | Performed by: FAMILY MEDICINE

## 2019-04-11 PROCEDURE — 36415 COLL VENOUS BLD VENIPUNCTURE: CPT | Performed by: FAMILY MEDICINE

## 2019-04-11 PROCEDURE — 87389 HIV-1 AG W/HIV-1&-2 AB AG IA: CPT | Performed by: FAMILY MEDICINE

## 2019-04-11 PROCEDURE — 85025 COMPLETE CBC W/AUTO DIFF WBC: CPT | Performed by: FAMILY MEDICINE

## 2019-04-11 PROCEDURE — 99396 PREV VISIT EST AGE 40-64: CPT | Performed by: FAMILY MEDICINE

## 2019-04-11 PROCEDURE — 82043 UR ALBUMIN QUANTITATIVE: CPT | Performed by: FAMILY MEDICINE

## 2019-04-11 PROCEDURE — 83550 IRON BINDING TEST: CPT | Performed by: FAMILY MEDICINE

## 2019-04-11 ASSESSMENT — MIFFLIN-ST. JEOR: SCORE: 1237.31

## 2019-09-11 ENCOUNTER — MYC MEDICAL ADVICE (OUTPATIENT)
Dept: FAMILY MEDICINE | Facility: CLINIC | Age: 54
End: 2019-09-11

## 2019-09-23 NOTE — PROGRESS NOTES
Subjective     Augustus Mcgrath is a 53 year old female who presents to clinic today for the following health issues:    HPI   Hypertension Follow-up      Do you check your blood pressure regularly outside of the clinic? Yes     Are you following a low salt diet? Yes    Are your blood pressures ever more than 140 on the top number (systolic) OR more   than 90 on the bottom number (diastolic), for example 140/90? Yes   BP Readings from Last 3 Encounters:   19 (!) 152/90   19 (!) 154/104   19 (!) 153/98          How many servings of fruits and vegetables do you eat daily?  unsure    On average, how many sweetened beverages do you drink each day (soda, juice, sweet tea, etc)?   Not every day, on  one soda  How many days per week do you miss taking your medication? Has been missing taking it recently    What makes it hard for you to take your medications?  side effects    Patient Active Problem List   Diagnosis     Menorrhagia     CARDIOVASCULAR SCREENING; LDL GOAL LESS THAN 160     Enthesopathy     Need for hepatitis C screening test     Iron deficiency anemia due to chronic blood loss     Hypertension, goal below 140/90     ASCUS of cervix with negative high risk HPV     Migraine with aura     Past Surgical History:   Procedure Laterality Date     retinal surgery      scleral buckle and several lasers both eyes     VITRECTOMY ANTERIOR  13    [facility]       Social History     Tobacco Use     Smoking status: Former Smoker     Packs/day: 0.50     Years: 17.00     Pack years: 8.50     Types: Cigarettes     Last attempt to quit: 11/10/2010     Years since quittin.8     Smokeless tobacco: Never Used     Tobacco comment: occ   Substance Use Topics     Alcohol use: Yes     Comment: 5 drinks a week      Family History   Problem Relation Age of Onset     Hypertension Mother      Heart Disease Mother 85     Heart Failure Mother          of renal failure related to cardiac  testing     Lipids Father      Heart Disease Father 85         suddenly while doing yardwork     Hyperlipidemia Father      Cerebrovascular Disease Maternal Grandfather      Cerebrovascular Disease Paternal Grandfather      Thyroid Disease Sister         hypo     Breast Cancer Sister 53     Uterine Cancer Sister 53     Hypertension Sister      Hypertension Sister      Other Cancer Sister      Thyroid Disease Sister      Breast Cancer Sister          Allergies   Allergen Reactions     Lisinopril      BP Readings from Last 3 Encounters:   19 (!) 152/90   19 (!) 154/104   19 (!) 153/98    Wt Readings from Last 3 Encounters:   19 71.2 kg (157 lb)   19 68.5 kg (151 lb)   19 68 kg (150 lb)          Reviewed and updated as needed this visit by Provider  Allergies  Meds  Problems         Review of Systems   ROS COMP: Constitutional, HEENT, cardiovascular, pulmonary, gi and gu systems are negative, except as otherwise noted.      Objective    BP (!) 152/90 (BP Location: Left arm, Patient Position: Sitting, Cuff Size: Adult Regular)   Pulse 77   Temp 97.3  F (36.3  C) (Tympanic)   Resp 16   Wt 71.2 kg (157 lb)   SpO2 97%   Breastfeeding? No   BMI 29.66 kg/m    Body mass index is 29.66 kg/m .  Physical Exam   GENERAL: healthy, alert and no distress  NECK: no adenopathy, no asymmetry, masses, or scars and thyroid normal to palpation  RESP: lungs clear to auscultation - no rales, rhonchi or wheezes  CV: regular rate and rhythm, normal S1 S2, no S3 or S4, no murmur, click or rub, no peripheral edema and peripheral pulses strong  MS: no gross musculoskeletal defects noted, no edema          Assessment & Plan     1. Hypertension, goal below 140/90  BP Readings from Last 3 Encounters:   19 (!) 152/90   19 (!) 154/104   19 (!) 153/98    not at goal, side effects on ACE and hydrochlorothiazide, start med as below, follow up 1-2 weeks  - verapamil ER (CALAN-SR) 120 MG  CR tablet; Take 1 tablet (120 mg) by mouth At Bedtime  Dispense: 90 tablet; Refill: 1     Return in about 2 weeks (around 10/11/2019) for Hypertension follow up.    Sharlene Baltazar MD  [facility]

## 2019-09-27 ENCOUNTER — OFFICE VISIT (OUTPATIENT)
Dept: FAMILY MEDICINE | Facility: CLINIC | Age: 54
End: 2019-09-27
Payer: COMMERCIAL

## 2019-09-27 VITALS
BODY MASS INDEX: 29.66 KG/M2 | TEMPERATURE: 97.3 F | SYSTOLIC BLOOD PRESSURE: 152 MMHG | WEIGHT: 157 LBS | HEART RATE: 77 BPM | OXYGEN SATURATION: 97 % | DIASTOLIC BLOOD PRESSURE: 90 MMHG | RESPIRATION RATE: 16 BRPM

## 2019-09-27 DIAGNOSIS — I10 HYPERTENSION, GOAL BELOW 140/90: Primary | ICD-10-CM

## 2019-09-27 PROCEDURE — 99213 OFFICE O/P EST LOW 20 MIN: CPT | Performed by: FAMILY MEDICINE

## 2019-09-27 RX ORDER — VERAPAMIL HYDROCHLORIDE 120 MG/1
120 TABLET, FILM COATED, EXTENDED RELEASE ORAL AT BEDTIME
Qty: 90 TABLET | Refills: 1 | Status: SHIPPED | OUTPATIENT
Start: 2019-09-27 | End: 2019-10-10 | Stop reason: DRUGHIGH

## 2019-10-04 ENCOUNTER — HEALTH MAINTENANCE LETTER (OUTPATIENT)
Age: 54
End: 2019-10-04

## 2019-10-09 ENCOUNTER — MYC MEDICAL ADVICE (OUTPATIENT)
Dept: FAMILY MEDICINE | Facility: CLINIC | Age: 54
End: 2019-10-09

## 2019-10-09 DIAGNOSIS — I10 HYPERTENSION, GOAL BELOW 140/90: Primary | ICD-10-CM

## 2019-10-09 NOTE — TELEPHONE ENCOUNTER
Sharlene Baltazar MD,  Please see patient's MyChart message    Patient reports home BP on 138/96; patient saw [facility] nurse and /102    Writer advised patient to schedule office visit for follow up. Writer also asked if patient had any additional BP readings that could be looked at -- awaiting patient response    Do you want any changes to patient's medications or have any further recommendations at this time?    Thank You!  Courtney Mayer, RN  Triage Nurse

## 2019-10-10 RX ORDER — VERAPAMIL HYDROCHLORIDE 180 MG/1
180 TABLET, EXTENDED RELEASE ORAL AT BEDTIME
Qty: 90 TABLET | Refills: 1 | Status: SHIPPED | OUTPATIENT
Start: 2019-10-10 | End: 2019-11-06 | Stop reason: SINTOL

## 2019-11-06 ENCOUNTER — MYC MEDICAL ADVICE (OUTPATIENT)
Dept: FAMILY MEDICINE | Facility: CLINIC | Age: 54
End: 2019-11-06

## 2019-11-06 DIAGNOSIS — I10 HYPERTENSION, GOAL BELOW 140/90: Primary | ICD-10-CM

## 2019-11-06 RX ORDER — CARVEDILOL 6.25 MG/1
6.25 TABLET ORAL 2 TIMES DAILY WITH MEALS
Qty: 180 TABLET | Refills: 3 | Status: SHIPPED | OUTPATIENT
Start: 2019-11-06 | End: 2020-01-14 | Stop reason: SINTOL

## 2019-11-06 NOTE — TELEPHONE ENCOUNTER
Sharlene Baltazar MD,  Please see patient's MyChart message    Patient requesting alternative to increased dose of verapamil related to side effects she experienced in the past. She states she has not taken any since 10/27/2019    Pharmacy cued    Please advise    Thank You!  Courtney Mayer, RN  Triage Nurse

## 2019-11-07 NOTE — TELEPHONE ENCOUNTER
ASSESSMENT / PLAN:  (I10) Hypertension, goal below 140/90  (primary encounter diagnosis)  Comment:   See order, take twice daily and let me know BPs and side effects, if any.  Plan: carvedilol (COREG) 6.25 MG tablet        Prescription electronically sent to our pharmacy ([facility]) today.        Please let her know, thanks!  Sincerely,  Dr. Sharlene Baltazar MD  11/6/2019

## 2019-12-01 ENCOUNTER — VIRTUAL VISIT (OUTPATIENT)
Dept: FAMILY MEDICINE | Facility: OTHER | Age: 54
End: 2019-12-01

## 2019-12-01 NOTE — PROGRESS NOTES
"Date: 2019 12:05:31  Clinician: Javier Vazquez  Clinician NPI: [id number]  Patient: Augustus Mcgrath  Patient : 1965  Patient Address: [address] [place] [id number]  Patient Phone: [phone]  Visit Protocol: UTI  Patient Summary:  Augustus is a 54 year old ( : 1965 ) female who initiated a Visit for a presumed bladder infection. When asked the question \"Please sign me up to receive news, health information and promotions. \", Augustus responded \"No\".   Her symptoms started today and consist of dysuria and feeling as if the bladder is never empty.   Symptom details   Urine color: The color of her urine is yellow.    Denied symptoms include vaginal discharge, urinary urgency, urinary incontinence, vomiting, vaginal itching, foul-smelling urine, nausea, flank pain, abdominal pain, urinary frequency, and chills. She does not feel feverish.   Augustus has not used any over-the-counter medications or home remedies to relieve her current symptoms.  Precipitating events  Augustus denies having a sexually transmitted disease.  Pertinent medical history  Augustus has had a bladder infection before but has not had any in the past 12 months. Her current symptoms are similar to her previous bladder infection symptoms.   She is not sure what antibiotics have been effective in treating her past bladder infections.   Augustus has not been prescribed antibiotics to prevent frequent or repeated bladder infections in the past and does not get yeast infections when she takes antibiotics. She has not experienced problems or side effects with any of the common antibiotics used to treat bladder infections.   Augustus does not have a history of kidney stones. She has not used a catheter or been a patient in a hospital or nursing home in the past 2 weeks.   Augustus does not smoke or use smokeless tobacco.     MEDICATIONS: carvedilol oral, ALLERGIES: NKDA  Clinician Response:  Dear Augustus,  Based on the information you " have provided, you likely have an acute urinary tract infection, also called a bladder infection. Bladder infections occur when bacteria from the outside of the body enters the urinary tract. Any part of the urinary system can be infected, but the bladder is the most common.  Medication information  I am prescribing:     Cephalexin (Keflex) 500 mg oral capsule. Take 1 capsule by mouth every 12 hours for 7 days. There are no refills with this prescription.   The medication I prescribed for your bladder infection is an antibiotic. Continue taking the medication until it is gone even if you feel better. If you get an upset stomach while taking antibiotics, taking the medication with food can help.   Yeast infections can be a common side effect of antibiotics. The most common symptom of a yeast infection is itchiness in and around the vagina. Other signs and symptoms include burning, redness, or a thick, white vaginal discharge that looks like cottage cheese and does not have a bad smell.  Self care  Urination helps to flush bacteria from the urinary tract. For this reason, drinking water and urinating often helps relieve some urinary symptoms and can decrease your risk of getting bladder infections in the future.  Other steps you can take to prevent future bladder infections include:     Wipe front to back after using the bathroom    Urinate after sexual intercourse    Avoid using deodorant sprays, douches, or powders in the vaginal area     When to seek care  Please make an appointment to be seen in a clinic or urgent care if any of the following occur:     You develop new symptoms or your symptoms become worse    You have medication side effects that make it difficult to take them as prescribed    Your symptoms do not improve within 1-2 days of starting treatment    You have symptoms of a bladder infection that return shortly after completing treatment     It is possible to have an allergic reaction to an antibiotic  even if you have not had one in the past. If you notice a new rash, significant swelling, or difficulty breathing, stop taking this medication immediately and go to a clinic or urgent care.   Diagnosis: Acute uncomplicated bladder infection  Diagnosis ICD: N39.0  Prescription: cephalexin (Keflex) 500 mg oral capsule 14 capsule, 7 days supply. Take 1 capsule by mouth every 12 hours for 7 days. Refills: 0, Refill as needed: no, Allow substitutions: yes  Pharmacy: [id number] - [phone] - [address] [place] [address]

## 2020-01-14 ENCOUNTER — OFFICE VISIT (OUTPATIENT)
Dept: FAMILY MEDICINE | Facility: CLINIC | Age: 55
End: 2020-01-14
Payer: COMMERCIAL

## 2020-01-14 VITALS
DIASTOLIC BLOOD PRESSURE: 80 MMHG | OXYGEN SATURATION: 98 % | HEIGHT: 61 IN | HEART RATE: 72 BPM | WEIGHT: 156 LBS | RESPIRATION RATE: 14 BRPM | SYSTOLIC BLOOD PRESSURE: 132 MMHG | TEMPERATURE: 98.2 F | BODY MASS INDEX: 29.45 KG/M2

## 2020-01-14 DIAGNOSIS — G43.109 MIGRAINE WITH AURA AND WITHOUT STATUS MIGRAINOSUS, NOT INTRACTABLE: Primary | ICD-10-CM

## 2020-01-14 DIAGNOSIS — I10 HYPERTENSION, GOAL BELOW 140/90: ICD-10-CM

## 2020-01-14 PROCEDURE — 99214 OFFICE O/P EST MOD 30 MIN: CPT | Performed by: FAMILY MEDICINE

## 2020-01-14 RX ORDER — LEVONORGESTREL AND ETHINYL ESTRADIOL 0.15-0.03
1 KIT ORAL DAILY
Qty: 90 TABLET | Refills: 3 | Status: SHIPPED | OUTPATIENT
Start: 2020-01-14 | End: 2020-03-05

## 2020-01-14 RX ORDER — SUMATRIPTAN 50 MG/1
50 TABLET, FILM COATED ORAL
Qty: 30 TABLET | Refills: 1 | Status: SHIPPED | OUTPATIENT
Start: 2020-01-14

## 2020-01-14 RX ORDER — ONDANSETRON 4 MG/1
4 TABLET, ORALLY DISINTEGRATING ORAL EVERY 8 HOURS PRN
Qty: 12 TABLET | Refills: 1 | Status: SHIPPED | OUTPATIENT
Start: 2020-01-14

## 2020-01-14 RX ORDER — HYDROCHLOROTHIAZIDE 25 MG/1
25 TABLET ORAL DAILY
Qty: 90 TABLET | Refills: 3 | Status: SHIPPED | OUTPATIENT
Start: 2020-01-14 | End: 2020-10-21

## 2020-01-14 RX ORDER — METOCLOPRAMIDE 10 MG/1
10 TABLET ORAL 4 TIMES DAILY PRN
Qty: 30 TABLET | Refills: 3 | Status: SHIPPED | OUTPATIENT
Start: 2020-01-14 | End: 2023-11-03

## 2020-01-14 ASSESSMENT — MIFFLIN-ST. JEOR: SCORE: 1254.99

## 2020-01-14 NOTE — PATIENT INSTRUCTIONS
If your headache seem very cycle related, let me know and you could consider going on Seasonale or other oral contraception that you would take daily; can increase risk of stroke but less likely since you stopped smoking! Hurray!    Take with ibuprofen and acetaminophen.      1. Shingrix  I strongly recommend getting the shingles vaccine (Shingrix) if you are over age 50, but please check with your insurance to see how much you might have to pay for this vaccine! If you are on Medicare, it's covered if you get it at a pharmacy but not in a clinic.    This shot will prevent shingles, a painful skin rash that you are at risk for getting if you have ever had chicken pox. Sometimes the pain will last the rest of your life, even after the rash has healed, and there is not much that we can do to relieve the pain.    Please consider getting this vaccine!    2. Mammo due  You can call any of the centers below to schedule your mammogram.  1.  Mammography [facility]  Appointment line [phone]  2   [facility]   Appointment line [phone]   3.   [facility]   Appointment line [phone]      Patient Education     Hydrochlorothiazide, HCTZ capsules or tablets  Brand Names: Esidrix, Ezide, HydroDIURIL, Microzide, Oretic, Zide  What is this medicine?  HYDROCHLOROTHIAZIDE (mariama droe klor oh THYE a zide) is a diuretic. It increases the amount of urine passed, which causes the body to lose salt and water. This medicine is used to treat high blood pressure. It is also reduces the swelling and water retention caused by various medical conditions, such as heart, liver, or kidney disease.  How should I use this medicine?  Take this medicine by mouth with a glass of water. Follow the directions on the prescription label. Take your medicine at regular intervals. Remember that you will need to pass urine frequently after taking this medicine. Do not take your doses at a time of day that  will cause you problems. Do not stop taking your medicine unless your doctor tells you to.  Talk to your pediatrician regarding the use of this medicine in children. Special care may be needed.  What side effects may I notice from receiving this medicine?  Side effects that you should report to your doctor or health care professional as soon as possible:    allergic reactions such as skin rash or itching, hives, swelling of the lips, mouth, tongue, or throat    changes in vision    chest pain    eye pain    fast or irregular heartbeat    feeling faint or lightheaded, falls    gout attack    muscle pain or cramps    pain or difficulty when passing urine    pain, tingling, numbness in the hands or feet    redness, blistering, peeling or loosening of the skin, including inside the mouth    unusually weak or tired  Side effects that usually do not require medical attention (report to your doctor or health care professional if they continue or are bothersome):    change in sex drive or performance    dry mouth    headache    stomach upset  What may interact with this medicine?    cholestyramine    colestipol    digoxin    dofetilide    lithium    medicines for blood pressure    medicines for diabetes    medicines that relax muscles for surgery    other diuretics    steroid medicines like prednisone or cortisone    What if I miss a dose?  If you miss a dose, take it as soon as you can. If it is almost time for your next dose, take only that dose. Do not take double or extra doses.  Where should I keep my medicine?  Keep out of the reach of children.  Store at room temperature between 15 and 30 degrees C (59 and 86 degrees F). Do not freeze. Protect from light and moisture. Keep container closed tightly. Throw away any unused medicine after the expiration date.  What should I tell my health care provider before I take this medicine?  They need to know if you have any of these conditions:    diabetes    gout    immune  system problems, like lupus    kidney disease or kidney stones    liver disease    pancreatitis    small amount of urine or difficulty passing urine    an unusual or allergic reaction to hydrochlorothiazide, sulfa drugs, other medicines, foods, dyes, or preservatives    pregnant or trying to get pregnant    breast-feeding  What should I watch for while using this medicine?  Visit your doctor or health care professional for regular checks on your progress. Check your blood pressure as directed. Ask your doctor or health care professional what your blood pressure should be and when you should contact him or her.  You may need to be on a special diet while taking this medicine. Ask your doctor.  Check with your doctor or health care professional if you get an attack of severe diarrhea, nausea and vomiting, or if you sweat a lot. The loss of too much body fluid can make it dangerous for you to take this medicine.  You may get drowsy or dizzy. Do not drive, use machinery, or do anything that needs mental alertness until you know how this medicine affects you. Do not stand or sit up quickly, especially if you are an older patient. This reduces the risk of dizzy or fainting spells. Alcohol may interfere with the effect of this medicine. Avoid alcoholic drinks.  This medicine may affect your blood sugar level. If you have diabetes, check with your doctor or health care professional before changing the dose of your diabetic medicine.  This medicine can make you more sensitive to the sun. Keep out of the sun. If you cannot avoid being in the sun, wear protective clothing and use sunscreen. Do not use sun lamps or tanning beds/booths.  NOTE:This sheet is a summary. It may not cover all possible information. If you have questions about this medicine, talk to your doctor, pharmacist, or health care provider. Copyright  2019 [employer]

## 2020-01-14 NOTE — PROGRESS NOTES
Subjective     Augustus Mcgrath is a 54 year old female who presents to clinic today for the following health issues:    HPI   Hypertension Follow-up      Do you check your blood pressure regularly outside of the clinic? Yes     Are you following a low salt diet? Yes 1500 -2000 mg daily    Are your blood pressures ever more than 140 on the top number (systolic) OR more   than 90 on the bottom number (diastolic), for example 140/90? Yes   Carvedilol was causing chest pressure, hztc caused itching, lisinopril caused dizziness, .verapamil, caused constipation and fatigue, along with chest tightness/pressure.    BP Readings from Last 3 Encounters:   01/14/20 132/80   09/27/19 (!) 152/90   04/11/19 (!) 154/104          How many servings of fruits and vegetables do you eat daily?  2-3    On average, how many sweetened beverages do you drink each day (Examples: soda, juice, sweet tea, etc.  Do NOT count diet or artificially sweetened beverages)?   1 on Fridays     How many days per week do you exercise enough to make your heart beat faster? Walking     How many minutes a day do you exercise enough to make your heart beat faster? 30 - 60    How many days per week do you miss taking your medication? 0    Severe headaches  History of migraines, with recent 2 severe episodes that last for days, associated with nausea and vomiting. Usually occur at end of the week, may be triggered by a few drinks (whiskey), fatigue, after a restaurant meal where she eats regularly ([employer]). Tylenol, ibuprofen, and aspirin, nothing helps, but she also is vomiting so can't keep anything down.    Patient Active Problem List   Diagnosis     Menorrhagia     CARDIOVASCULAR SCREENING; LDL GOAL LESS THAN 160     Enthesopathy     Need for hepatitis C screening test     Iron deficiency anemia due to chronic blood loss     Hypertension, goal below 140/90     ASCUS of cervix with negative high risk HPV     Migraine with aura     Past Surgical  History:   Procedure Laterality Date     retinal surgery      scleral buckle and several lasers both eyes     VITRECTOMY ANTERIOR  13    [facility]       Social History     Tobacco Use     Smoking status: Former Smoker     Packs/day: 0.50     Years: 17.00     Pack years: 8.50     Types: Cigarettes     Last attempt to quit: 11/10/2010     Years since quittin.1     Smokeless tobacco: Never Used     Tobacco comment: occ   Substance Use Topics     Alcohol use: Yes     Comment: 5 drinks a week      Family History   Problem Relation Age of Onset     Hypertension Mother      Heart Disease Mother 85     Heart Failure Mother          of renal failure related to cardiac testing     Lipids Father      Heart Disease Father 85         suddenly while doing yardwork     Hyperlipidemia Father      Cerebrovascular Disease Maternal Grandfather      Cerebrovascular Disease Paternal Grandfather      Thyroid Disease Sister         hypo     Breast Cancer Sister 53     Uterine Cancer Sister 53     Hypertension Sister      Hypertension Sister      Other Cancer Sister      Thyroid Disease Sister      Breast Cancer Sister          Current Outpatient Medications   Medication Sig Dispense Refill     hydrochlorothiazide (HYDRODIURIL) 25 MG tablet Take 1 tablet (25 mg) by mouth daily 90 tablet 3     levonorgestrel-ethinyl estradiol (SEASONALE) 0.15-0.03 MG tablet Take 1 tablet by mouth daily 90 tablet 3     metoclopramide (REGLAN) 10 MG tablet Take 1 tablet (10 mg) by mouth 4 times daily as needed (nausea) 30 tablet 3     ondansetron (ZOFRAN-ODT) 4 MG ODT tab Take 1 tablet (4 mg) by mouth every 8 hours as needed for nausea 12 tablet 1     SUMAtriptan (IMITREX) 50 MG tablet Take 1 tablet (50 mg) by mouth at onset of headache for migraine May repeat every  2 hours until symptoms resolved. Max 4 tablets/24 hours. 30 tablet 1     Allergies   Allergen Reactions     Lisinopril      Recent Labs   Lab Test 19  0985  "03/01/18  0920 11/22/16  0854   LDL  --   --  110*   HDL  --   --  48*   TRIG  --   --  148   ALT 34  --   --    CR 0.65 0.65 0.74   GFRESTIMATED >90 >90 84   GFRESTBLACK >90 >90 >90  African American GFR Calc     POTASSIUM 3.6 3.9 4.5      BP Readings from Last 3 Encounters:   01/14/20 132/80   09/27/19 (!) 152/90   04/11/19 (!) 154/104    Wt Readings from Last 3 Encounters:   01/14/20 70.8 kg (156 lb)   09/27/19 71.2 kg (157 lb)   04/11/19 68.5 kg (151 lb)         Reviewed and updated as needed this visit by Provider  Tobacco  Problems         Review of Systems   ROS COMP: Constitutional, HEENT, cardiovascular, pulmonary, GI, , musculoskeletal, neuro, skin, endocrine and psych systems are negative, except as otherwise noted.      Objective    /80   Pulse 72   Temp 98.2  F (36.8  C) (Oral)   Resp 14   Ht 1.549 m (5' 1\")   Wt 70.8 kg (156 lb)   SpO2 98%   Breastfeeding No   BMI 29.48 kg/m    Body mass index is 29.48 kg/m .  Physical Exam   GENERAL: healthy, alert and no distress  RESP: no respiratory distress  MS: no gross musculoskeletal defects noted, no edema  SKIN: no suspicious lesions or rashes  NEURO: Normal strength and tone, mentation intact and speech normal  PSYCH: mentation appears normal, affect normal/bright         Assessment & Plan     1. Migraine with aura and without status migrainosus, not intractable  May be cyclical, also possibly aggravated by alcohol, fatigue, noise, food additives.  Start medication for nausea and prescription migraine medication as below.  May start ocps to regulate cycles as well to see if this helps.  See AVS  - ondansetron (ZOFRAN-ODT) 4 MG ODT tab; Take 1 tablet (4 mg) by mouth every 8 hours as needed for nausea  Dispense: 12 tablet; Refill: 1  - metoclopramide (REGLAN) 10 MG tablet; Take 1 tablet (10 mg) by mouth 4 times daily as needed (nausea)  Dispense: 30 tablet; Refill: 3  - SUMAtriptan (IMITREX) 50 MG tablet; Take 1 tablet (50 mg) by mouth at " "onset of headache for migraine May repeat every  2 hours until symptoms resolved. Max 4 tablets/24 hours.  Dispense: 30 tablet; Refill: 1  - levonorgestrel-ethinyl estradiol (SEASONALE) 0.15-0.03 MG tablet; Take 1 tablet by mouth daily  Dispense: 90 tablet; Refill: 3    2. Hypertension, goal below 140/90  BP Readings from Last 3 Encounters:   01/14/20 132/80   09/27/19 (!) 152/90   04/11/19 (!) 154/104    not at goal (initial blood pressure 148/88) with intolerance of blood pressure medications. She felt the most tolerable was hydrochlorothiazide, will restart as below.  - hydrochlorothiazide (HYDRODIURIL) 25 MG tablet; Take 1 tablet (25 mg) by mouth daily  Dispense: 90 tablet; Refill: 3     BMI:   Estimated body mass index is 29.48 kg/m  as calculated from the following:    Height as of this encounter: 1.549 m (5' 1\").    Weight as of this encounter: 70.8 kg (156 lb).         Return in about 4 weeks (around 2/11/2020) for follow up if not improving. (migraines)    Sharlene Baltazar MD  [facility]      "

## 2020-01-24 DIAGNOSIS — Z12.31 VISIT FOR SCREENING MAMMOGRAM: ICD-10-CM

## 2020-01-24 PROCEDURE — 77067 SCR MAMMO BI INCL CAD: CPT | Mod: TC

## 2020-01-24 PROCEDURE — 77063 BREAST TOMOSYNTHESIS BI: CPT

## 2020-03-02 ENCOUNTER — MYC MEDICAL ADVICE (OUTPATIENT)
Dept: FAMILY MEDICINE | Facility: CLINIC | Age: 55
End: 2020-03-02

## 2020-03-04 NOTE — TELEPHONE ENCOUNTER
I would suggest waiting for PCPs response who is in the clinic tomorrow.   She can certainly hold off on OCP until she hears back from PCP.   If symptoms are significantly getting worse - she should follow up with any provider in the clinic

## 2020-03-04 NOTE — TELEPHONE ENCOUNTER
Dr. Baltazar/ Provider-Please review patient's response and advise.  Should patient stop OCP given amount of bleeding?    Writer is not familiar with any studies/meta-analyses regarding this particular topic.    Thank you!  CATHERINE FisherN, RN

## 2020-03-05 ENCOUNTER — OFFICE VISIT (OUTPATIENT)
Dept: FAMILY MEDICINE | Facility: CLINIC | Age: 55
End: 2020-03-05
Payer: COMMERCIAL

## 2020-03-05 VITALS
WEIGHT: 156 LBS | HEART RATE: 92 BPM | DIASTOLIC BLOOD PRESSURE: 92 MMHG | TEMPERATURE: 98.8 F | SYSTOLIC BLOOD PRESSURE: 146 MMHG | BODY MASS INDEX: 29.48 KG/M2 | RESPIRATION RATE: 14 BRPM | OXYGEN SATURATION: 99 %

## 2020-03-05 DIAGNOSIS — N93.9 EPISODE OF HEAVY VAGINAL BLEEDING: Primary | ICD-10-CM

## 2020-03-05 PROCEDURE — 99213 OFFICE O/P EST LOW 20 MIN: CPT | Performed by: FAMILY MEDICINE

## 2020-03-05 RX ORDER — NORETHINDRONE ACETATE 5 MG
10 TABLET ORAL DAILY
Qty: 6 TABLET | Refills: 0 | Status: ON HOLD | OUTPATIENT
Start: 2020-03-05 | End: 2020-06-15

## 2020-03-05 NOTE — PROGRESS NOTES
Subjective     Augustus Mcgrath is a 54 year old female who presents to clinic today for the following health issues:    HPI   Vaginal Bleeding (Dysmenorrhea)      Onset: 18 days    Description:  Duration of bleeding episodes: constant bleeding x 18 days  Describe bleeding/flow:    Clots: YES  Number of tampons/hour: 1 super tampon/hour  Cramping: moderate    Accompanying signs and symptoms: body ache    History (similar episodes/previous evaluation): previous similar problem about 3 years ago but not same severity     Precipitating or alleviating factors: OCP    Therapies tried and outcome: None    Started seasonale 7 weeks ago.  After 5 weeks she got her period which has lasted 18 days and has remained heavy.  She stopped the OCPs 4 days ago.  Bleeding is relentless.    She is perimenopausal and previously was having regular but heavy periods throughout her 40's - flow lasting 7 days. Then had decreasing frequency of menses for the past couple years; she would go 3-4 months between periods.  Last fall she started having regular, monthly periods again, also 7 days in duration.      Has had some hot flashes and night sweats in the past few years.  Started the Seasonale in January for possible migraine benefit.      PMH: remote history of fibroids and heavy bleeding.  Tried IUD but it wouldn't stay in.  Was offered hysterectomy which she declined.  Last pelvic US on record is from 2009.    FHX: sister with uterine cancer at age 53      BP Readings from Last 3 Encounters:   03/05/20 (!) 146/92   01/14/20 132/80   09/27/19 (!) 152/90    Wt Readings from Last 3 Encounters:   03/05/20 70.8 kg (156 lb)   01/14/20 70.8 kg (156 lb)   09/27/19 71.2 kg (157 lb)           Reviewed and updated as needed this visit by Provider  Meds  Problems         Review of Systems         Objective    BP (!) 146/92 (BP Location: Right arm, Patient Position: Sitting, Cuff Size: Adult Regular)   Pulse 92   Temp 98.8  F (37.1  C) (Oral)    Resp 14   Wt 70.8 kg (156 lb)   LMP 02/16/2020 (Exact Date)   SpO2 99%   Breastfeeding No   BMI 29.48 kg/m    Body mass index is 29.48 kg/m .  Physical Exam   GEN:  no apparent distress      Diagnostic Test Results:  Labs reviewed in Epic        Assessment & Plan       ICD-10-CM    1. Episode of heavy vaginal bleeding N93.9 norethindrone (AYGESTIN) 5 MG tablet     Very heavy, prolonged bleeding episode in perimenopausal female with a history of fibroids, who recently started on continuous OCPs, and who has a family history of uterine Ca.      For bleeding control I offered her high-dose progesterone.  I am hesitant to use high-dose estrogen in this patient with history of migraine with aura.      Discussed that this needs further diagnostic workup and management which will likely include pelvic ultrasound and endometrial sampling of some sort (EMB vs D&C).  She is scheduled with GYN here tomorrow.      Patient Instructions   Schedule appointment with Dr. Hernandez tomorrow at [facility].        No follow-ups on file.    Sia Langston MD  [facility]

## 2020-03-06 ENCOUNTER — OFFICE VISIT (OUTPATIENT)
Dept: OBGYN | Facility: CLINIC | Age: 55
End: 2020-03-06
Payer: COMMERCIAL

## 2020-03-06 VITALS
SYSTOLIC BLOOD PRESSURE: 138 MMHG | WEIGHT: 156 LBS | TEMPERATURE: 98.5 F | BODY MASS INDEX: 29.48 KG/M2 | HEART RATE: 92 BPM | DIASTOLIC BLOOD PRESSURE: 88 MMHG

## 2020-03-06 DIAGNOSIS — Z13.9 SCREENING PROCEDURE: ICD-10-CM

## 2020-03-06 DIAGNOSIS — N92.1 MENORRHAGIA WITH IRREGULAR CYCLE: Primary | ICD-10-CM

## 2020-03-06 DIAGNOSIS — N95.1 PERIMENOPAUSAL: ICD-10-CM

## 2020-03-06 DIAGNOSIS — Z80.49 FAMILY HISTORY OF UTERINE CANCER: ICD-10-CM

## 2020-03-06 LAB
BASOPHILS # BLD AUTO: 0 10E9/L (ref 0–0.2)
BASOPHILS NFR BLD AUTO: 0.2 %
DIFFERENTIAL METHOD BLD: NORMAL
EOSINOPHIL # BLD AUTO: 0.2 10E9/L (ref 0–0.7)
EOSINOPHIL NFR BLD AUTO: 1.7 %
ERYTHROCYTE [DISTWIDTH] IN BLOOD BY AUTOMATED COUNT: 13.7 % (ref 10–15)
HCG UR QL: NEGATIVE
HCT VFR BLD AUTO: 40.7 % (ref 35–47)
HGB BLD-MCNC: 12.9 G/DL (ref 11.7–15.7)
LYMPHOCYTES # BLD AUTO: 2.5 10E9/L (ref 0.8–5.3)
LYMPHOCYTES NFR BLD AUTO: 26 %
MCH RBC QN AUTO: 27.5 PG (ref 26.5–33)
MCHC RBC AUTO-ENTMCNC: 31.7 G/DL (ref 31.5–36.5)
MCV RBC AUTO: 87 FL (ref 78–100)
MONOCYTES # BLD AUTO: 0.7 10E9/L (ref 0–1.3)
MONOCYTES NFR BLD AUTO: 7 %
NEUTROPHILS # BLD AUTO: 6.2 10E9/L (ref 1.6–8.3)
NEUTROPHILS NFR BLD AUTO: 65.1 %
PLATELET # BLD AUTO: 296 10E9/L (ref 150–450)
RBC # BLD AUTO: 4.69 10E12/L (ref 3.8–5.2)
WBC # BLD AUTO: 9.5 10E9/L (ref 4–11)

## 2020-03-06 PROCEDURE — 36415 COLL VENOUS BLD VENIPUNCTURE: CPT | Performed by: OBSTETRICS & GYNECOLOGY

## 2020-03-06 PROCEDURE — 81025 URINE PREGNANCY TEST: CPT | Performed by: OBSTETRICS & GYNECOLOGY

## 2020-03-06 PROCEDURE — 85025 COMPLETE CBC W/AUTO DIFF WBC: CPT | Performed by: OBSTETRICS & GYNECOLOGY

## 2020-03-06 PROCEDURE — 88305 TISSUE EXAM BY PATHOLOGIST: CPT | Performed by: OBSTETRICS & GYNECOLOGY

## 2020-03-06 PROCEDURE — 99204 OFFICE O/P NEW MOD 45 MIN: CPT | Mod: 25 | Performed by: OBSTETRICS & GYNECOLOGY

## 2020-03-06 PROCEDURE — 58100 BIOPSY OF UTERUS LINING: CPT | Performed by: OBSTETRICS & GYNECOLOGY

## 2020-03-06 RX ORDER — NORETHINDRONE ACETATE 5 MG
TABLET ORAL
Qty: 30 TABLET | Refills: 3 | Status: ON HOLD | OUTPATIENT
Start: 2020-03-06 | End: 2020-06-15

## 2020-03-06 NOTE — PROGRESS NOTES
GYN Clinic Note  Date: 3/6/2020    CC:  Abnormal Uterine Bleeding    HPI:  Augustus Mcgrath is a 54 year old female  presents for evaluation of abnormal uterine bleeding as a referral from Dr. Marc.    [facility]  Started seasonale 7 weeks ago.  After 5 weeks she got her period which has lasted 18 days and has remained heavy.  She stopped the OCPs 4 days ago.  Bleeding is relentless.     She is perimenopausal and previously was having regular but heavy periods throughout her 40's - flow lasting 7 days. Then had decreasing frequency of menses for the past couple years; she would go 3-4 months between periods.  Last  she started having regular, monthly periods again, also 7 days in duration.       Has had some hot flashes and night sweats in the past few years.  Started the Seasonale in January for possible migraine benefit.      Bleeding started in beginning of 5th week of extended cycle oral contractive pills.  First week like normal period.  Second week tapered off a bit.  Third week, super heavy.  Got norethindrone yesterday.  Took 2 about noon yesterday.  Bleeding stopped briefly around 1700, but back heavy during the night.    Sometimes so heavy she's changing super tampon every 20 minutes.      Something similar in 40s, but then got better.  Has history of fibroid.  Hysterectomy was offered, but due to presence of ovarian cyst, she was instructed that if they did hysterectomy, they remove her ovaries.  She did want her ovaries removed so young, so she never pursued hysterectomy.  Last ultrasound in .    Periods started getting irregular, skipping up to 3-4months about 2.5 years ago.    In November, got first really bad migraine.  Then periods became more regular q 4w again for a few months.  Was then getting menstrual migraines with these.  That's when she was prescribed Seasonale.  Maybe helped a little?     Duration of bleeding problem: 19  days  Frequency of bleeding: daily  Flow: heavy, changes a super tampon every 1 hours at the heaviest  Pelvic pain: moderate cramping    Her work-up thus far for her abnormal bleeding has included:  - pregnancy test: neg today.   s/p vasectomy   - Last pap: 2018, ASCUS, HPV neg    Patient has tried to following treatments: On Seasonale currently.  Previously expelled IUD.     GYN HISTORY:  Patient's last menstrual period was 2020 (exact date).    STI history: No STD history  History of abnormal pap: Hx ASCUS, HPV neg in 2018.  History of cervical procedures: none  Contraceptive History: history of IUD, combined oral contractive pills.   s/p vasectomy  The patient is sexually active with male partner.     Patient has following risk factors for endometrial cancer:  - Unopposed estrogen exposure: No  - Obesity: Body mass index is 29.48 kg/m .   - Smoking: No  - Nulliparity: No  - Infertility: No  - Early age of menarche / late age of menopause: Menarche age [age].   - Family history of colon cancer, ovarian cancer, and type I endometrial cancer: Yes. Details: One sister with breast cancer, one with uterine cancer, one healthy.  Diagnosed both sisters diagnosed with cancer at age 53yo.  Maybe dad had colon cancer?      OBSTETRIC HISTORY:   OB History    Para Term  AB Living   1 0 0 0 1 0   SAB TAB Ectopic Multiple Live Births   0 1 0 0 0      # Outcome Date GA Lbr Tono/2nd Weight Sex Delivery Anes PTL Lv   1 TAB                Past Medical History:   Diagnosis Date     ASCUS of cervix with negative high risk HPV 2018    3/1/18 ASCUS, Neg HPV     Contraception      has vasectomy     Eye problems     right eye, retinal detachment, LEFT EY PROBLEMATIC AS WELL     Hypertension 2017     Iron deficiency anemia due to chronic blood loss 2016       Past Surgical History:   Procedure Laterality Date     retinal surgery      scleral buckle and several lasers both eyes      VITRECTOMY ANTERIOR  13    -[facility]       Social History     Tobacco Use     Smoking status: Former Smoker     Packs/day: 0.50     Years: 17.00     Pack years: 8.50     Types: Cigarettes     Last attempt to quit: 11/10/2010     Years since quittin.3     Smokeless tobacco: Never Used     Tobacco comment: occ   Substance Use Topics     Alcohol use: Yes     Comment: 5 drinks a week      Drug use: No       Family History   Problem Relation Age of Onset     Hypertension Mother      Heart Disease Mother 85     Heart Failure Mother          of renal failure related to cardiac testing     Lipids Father      Heart Disease Father 85         suddenly while doing yardwork     Hyperlipidemia Father      Cerebrovascular Disease Maternal Grandfather      Cerebrovascular Disease Paternal Grandfather      Thyroid Disease Sister         hypo     Breast Cancer Sister 52     Hypertension Sister 52     Thyroid Disease Sister      Uterine Cancer Sister        Current Outpatient Medications   Medication Sig Dispense Refill     hydrochlorothiazide (HYDRODIURIL) 25 MG tablet Take 1 tablet (25 mg) by mouth daily 90 tablet 3     metoclopramide (REGLAN) 10 MG tablet Take 1 tablet (10 mg) by mouth 4 times daily as needed (nausea) 30 tablet 3     norethindrone (AYGESTIN) 5 MG tablet Take 2 tablets daily until the bleeding stops.  Then take 1 tablet daily for another 5 days. 30 tablet 3     norethindrone (AYGESTIN) 5 MG tablet Take 2 tablets (10 mg) by mouth daily for 3 days 6 tablet 0     ondansetron (ZOFRAN-ODT) 4 MG ODT tab Take 1 tablet (4 mg) by mouth every 8 hours as needed for nausea 12 tablet 1     SUMAtriptan (IMITREX) 50 MG tablet Take 1 tablet (50 mg) by mouth at onset of headache for migraine May repeat every  2 hours until symptoms resolved. Max 4 tablets/24 hours. 30 tablet 1       Allergies: Lisinopril    ROS:  C: NEGATIVE for fever, chills, change in weight  I: NEGATIVE for worrisome rashes, moles  or lesions  E: NEGATIVE for vision changes or irritation  E/M: NEGATIVE for ear, mouth and throat problems  R: NEGATIVE for significant cough or SOB  CV: NEGATIVE for chest pain, palpitations or peripheral edema  GI: NEGATIVE for nausea, abdominal pain, heartburn, or change in bowel habits  : +abnormal bleeding as above. NEGATIVE for frequency, dysuria, hematuria, vaginal discharge  M: NEGATIVE for significant arthralgias or myalgia  N: NEGATIVE for weakness, dizziness or paresthesias  E: NEGATIVE for temperature intolerance, skin/hair changes  P: NEGATIVE for changes in mood or affect    EXAM:  Blood pressure 138/88, pulse 92, temperature 98.5  F (36.9  C), temperature source Oral, weight 70.8 kg (156 lb), last menstrual period 2020, not currently breastfeeding.   BMI= Body mass index is 29.48 kg/m .  General - pleasant female in no acute distress.  Head: Normocephalic, atraumatic. Conjunctiva clear, non icteric. PERRLA.  Abdomen - soft, nontender, nondistended, no hepatosplenomegaly.  Pelvic - external genitalia: normal adult female without lesions or abnormalities; BUS: within normal limits; Vagina: well rugated, no discharge, no lesions; Dark old blood in vault.  Cervix: no lesions or CMT;   Rectovaginal - deferred.  Musculoskeletal - no gross deformities.  Neurological - normal strength, sensation, and mental status.  Psych:  Appropriate mood and affect  Skin:  No suspicious rashes or lesions appreciated.      ASSESSMENT:  Augustus Mcgrath is a 54 year old  who presents with abnormal uterine bleeding. Discussed differential diagnosis: Disordered proliferative endometrium due to perimenopause, hyperplasia, malignancy, fibroids and polyps    PLAN:  1. Menorrhagia with irregular cycle  Discussed differential diagnosis, as above.  Endometrial biopsy was performed today without complication.  Also discussed transvaginal ultrasound to follow-up fibroid, but she declined at this point.  Is not really  interested in surgical intervention, but largely wants to rule out endometrial cancer due to family history  - CBC with platelets differential  - ENDOMETRIAL BIOPSY W/O CERVICAL DILATION  - Surgical pathology exam    2. Perimenopausal  Patient plans to stop Seasonale, but will continue norethindrone.  Refill prescribed as below.  Discussed that this could also be taken on an as-needed basis for heavy or irregular cycles, once uterine pathology is ruled out.  Discussed that she could also continue Seasonale to help regulate cycles, but she is not interested in that at this point.  Hoping things will go back to what they used to be before starting this medication.  Additionally, is unsure really helped her migraines anyways  - norethindrone (AYGESTIN) 5 MG tablet; Take 2 tablets daily until the bleeding stops.  Then take 1 tablet daily for another 5 days.  Dispense: 30 tablet; Refill: 3  - ENDOMETRIAL BIOPSY W/O CERVICAL DILATION  - Surgical pathology exam    3. Family history of uterine cancer  - ENDOMETRIAL BIOPSY W/O CERVICAL DILATION  - Surgical pathology exam    4.  Screening procedure  Due for colonoscopy, so referral was placed.  Patient aware  - HCG Qual, Urine ([id number])  - GASTROENTEROLOGY ADULT REF PROCEDURE ONLY    RTC 1 yr and prn.    Wendy Hernandez MD

## 2020-03-06 NOTE — NURSING NOTE
"Chief Complaint   Patient presents with     Abnormal Uterine Bleeding       Initial /88 (BP Location: Left arm, Patient Position: Sitting, Cuff Size: Adult Regular)   Pulse 92   Temp 98.5  F (36.9  C) (Oral)   Wt 70.8 kg (156 lb)   LMP 2020 (Exact Date)   BMI 29.48 kg/m   Estimated body mass index is 29.48 kg/m  as calculated from the following:    Height as of 20: 1.549 m (5' 1\").    Weight as of this encounter: 70.8 kg (156 lb).  BP completed using cuff size: regular    Questioned patient about current smoking habits.  Pt. quit smoking some time ago.          The following HM Due: NONE      The following patient reported/Care Every where data was sent to:  P ABSTRACT QUALITY INITIATIVES [[id number]]  EDWINA Jacinto MA           "

## 2020-03-06 NOTE — Clinical Note
Thank you for referring Augustus for heavy, prolonged cycle.  We did an endometrial biopsy today, so will await pathology.  Declined pelvic ultrasound at this point and will likely stop Seasonale.  Recommended she continue norethindrone for this episode of heavy bleeding and can also take prn if she has another prolonged cycle, assuming EMB normal.  Refill provided.Thanks again,Wendy Hernandez

## 2020-03-09 NOTE — PROGRESS NOTES
Procedure: Endometrial biopsy.   After informed consent was obtained, the cervix was cleansed with betadyne, grasped with a tenaculum.  The pipel was inserted easily and four quadrant sampling was done.  Sample was sent for pathology.    Tenaculum removed, hemostasis achieved with simple pressure, minimal bleeding. Patient tolerated procedure well.    Wendy Hernandez MD

## 2020-03-10 LAB — COPATH REPORT: NORMAL

## 2020-03-31 ENCOUNTER — MYC MEDICAL ADVICE (OUTPATIENT)
Dept: FAMILY MEDICINE | Facility: CLINIC | Age: 55
End: 2020-03-31

## 2020-03-31 NOTE — TELEPHONE ENCOUNTER
HW provider-Please review.  Writer would recommend provider evaluation today, but given location of pain, unsure if OB/GYN follow up recommended vs Family Practice.    Thank you!  WHIT Sky BSN, RN

## 2020-03-31 NOTE — TELEPHONE ENCOUNTER
"Maybe run by provider in clinic this week to confirm next steps.  Thanks  Kylah \"Avel\" ELROY Howell   "

## 2020-03-31 NOTE — TELEPHONE ENCOUNTER
Her upper abdominal symptoms almost sound like an ulcer, also could be related to current stress and anxiety.  Recommend she try Pepcid 20 mg twice a day over-the-counter couple days.  May do acetaminophen 1000 mg 3 times a day for 3 days.  Avoid dairy and eat light and drink plenty of fluids.  Avoid caffeine and carbonated drinks and try not to lie down for 45 minutes after eating.  May use the Zofran she has as needed.  Not sure if her hormones are causing her to be nauseous too she should probably discuss that with gynecology.  Constipation can definitely cause abdominal discomfort & cramping as well recommend increase activity fiber fluids and trying some MiraLAX to stay ahead of the game.  Since she took Tylenol hard to say if she has no fever or not.  Recommend trying this for couple of days and if not improved to make a telephone encounter.

## 2020-03-31 NOTE — TELEPHONE ENCOUNTER
Neither PCP Dr Baltazar nor Ivis, who last saw pt, are available for over a week.    To covering provider for  clinic - ok to put this one in as a phone visit today?    Upper abd pain, no fever    Romina Penaloza, RN, BSN

## 2020-04-01 ENCOUNTER — VIRTUAL VISIT (OUTPATIENT)
Dept: FAMILY MEDICINE | Facility: CLINIC | Age: 55
End: 2020-04-01
Payer: COMMERCIAL

## 2020-04-01 ENCOUNTER — TELEPHONE (OUTPATIENT)
Dept: FAMILY MEDICINE | Facility: CLINIC | Age: 55
End: 2020-04-01

## 2020-04-01 VITALS — BODY MASS INDEX: 29.45 KG/M2 | WEIGHT: 156 LBS | HEIGHT: 61 IN

## 2020-04-01 DIAGNOSIS — R10.13 EPIGASTRIC PAIN: Primary | ICD-10-CM

## 2020-04-01 DIAGNOSIS — R10.30 LOWER ABDOMINAL PAIN: ICD-10-CM

## 2020-04-01 PROCEDURE — 99213 OFFICE O/P EST LOW 20 MIN: CPT | Mod: TEL | Performed by: FAMILY MEDICINE

## 2020-04-01 ASSESSMENT — MIFFLIN-ST. JEOR: SCORE: 1254.99

## 2020-04-01 NOTE — PROGRESS NOTES
"Subjective     Augustus Mcgrath is a 54 year old female who is being evaluated via a billable telephone visit.      The patient has been notified of following:     \"This telephone visit will be conducted via a call between you and your physician/provider. We have found that certain health care needs can be provided without the need for a physical exam.  This service lets us provide the care you need with a short phone conversation.  If a prescription is necessary we can send it directly to your pharmacy.  If lab work is needed we can place an order for that and you can then stop by our lab to have the test done at a later time.    If during the course of the call the physician/provider feels a telephone visit is not appropriate, you will not be charged for this service.\"     Patient has given verbal consent for Telephone visit?  Yes    Augustus Mcgrath complains of No chief complaint on file.      ALLERGIES  Lisinopril  8:57 AM    ABDOMINAL   PAIN     Onset: Yvan night 3 days    Description:   Character: Burning and Fullness  Location: right upper quadrant left lowerquadrant  and center of abdomen   Radiation: None    Intensity: moderate    Progression of Symptoms:  worsening    Accompanying Signs & Symptoms:  Fever/Chills?:  cold sweats, no fever  Gas/Bloating: YES- both  Nausea: YES  Vomitting: YES- Sunday night 2 times and Monday night once  Diarrhea?: no   Constipation:YES- taking Miralax   Dysuria or Hematuria: no    History:   Trauma: no   Previous similar pain: no    Previous tests done: none    Precipitating factors:   Does the pain change with:     Food: no      BM: no     Urination: no     Alleviating factors:      Therapies Tried and outcome: pepcid helps a little while and tylenol    LMP:  02/21/2020 Sunday night, a couple of hours after going to bed she had a bad upper stomach burning pain. Just below her ribs.   She got cold sweats and vomited a couple of times.   Tried antacids and " vomited afterward. Felt miserable. Got to sleep  Woke up with headache. Took antinausea medicine she had for migraines. Took advil sinus and symptoms resolved.   Felt well rest of the day.   Monday night similar symptoms. Up a couple of hours after sleeping. Terrible burning. Cold sweats. Vomited again. Only relief was taking hot bath.   Tuesday morning she emailed Dr. Baltazar. She was also feeling constipated. Started pepcid, miralax. Never really felt good on Tuesday. Pepcid and tylenol helped for a little while, but came back.   Last night another hard night. Did not vomit. The pain was both upper and lower abdomen.   About 5:30 this morning, she took tylenol and got herself to eat something and had some water. Upper stomach is not burning right now. Some lower abdominal pain. Does have a migraine. Not nauseous now. No appetite.   She was feeling constipated, then took miralax. Had a BM on Monday, not her typical and has not had a bowel movement since then. Feels like she does need to have a BM. BM on Monday was very small and hard.Before that had not been struggling with constipation. No yellowing of the skin or eyes.   She says she has some belly tenderness. No significant tenderness.  She has not experienced pain like this before.   She did not recently start any new medicine.   She says she is a pretty big ibuprofen user for headaches and period pain. She also sometimes takes aspirin as well.   Alcohol use: She has not been drinking much because it seems to trigger migraines. She did have a drink on Sunday night with some cake.   Does get temporary relief from pepcid.   Eating makes her feel better briefly.   Denies hematemesis or coffee ground emesis.   No melena or hematochezia.  She has been checking her temp. First couple of nights it was 97 and this morning was 99.2.   Last couple of weeks has had some jaw or ear pain. Not bad.   Decongestants have been helping with her migraines.          Patient Active  Problem List   Diagnosis     Menorrhagia     Enthesopathy     Need for hepatitis C screening test     Iron deficiency anemia due to chronic blood loss     Hypertension, goal below 140/90     ASCUS of cervix with negative high risk HPV     Migraine with aura     Past Surgical History:   Procedure Laterality Date     retinal surgery  1986    scleral buckle and several lasers both eyes     VITRECTOMY ANTERIOR  13    [facility]       Social History     Tobacco Use     Smoking status: Former Smoker     Packs/day: 0.50     Years: 17.00     Pack years: 8.50     Types: Cigarettes     Last attempt to quit: 11/10/2010     Years since quittin.3     Smokeless tobacco: Never Used     Tobacco comment: occ   Substance Use Topics     Alcohol use: Yes     Comment: 5 drinks a week      Family History   Problem Relation Age of Onset     Hypertension Mother      Heart Disease Mother 85     Heart Failure Mother          of renal failure related to cardiac testing     Lipids Father      Heart Disease Father 85         suddenly while doing yardwork     Hyperlipidemia Father      Cerebrovascular Disease Maternal Grandfather      Cerebrovascular Disease Paternal Grandfather      Thyroid Disease Sister         hypo     Breast Cancer Sister 52     Hypertension Sister 52     Thyroid Disease Sister      Uterine Cancer Sister          Current Outpatient Medications   Medication Sig Dispense Refill     hydrochlorothiazide (HYDRODIURIL) 25 MG tablet Take 1 tablet (25 mg) by mouth daily 90 tablet 3     metoclopramide (REGLAN) 10 MG tablet Take 1 tablet (10 mg) by mouth 4 times daily as needed (nausea) 30 tablet 3     norethindrone (AYGESTIN) 5 MG tablet Take 2 tablets daily until the bleeding stops.  Then take 1 tablet daily for another 5 days. 30 tablet 3     ondansetron (ZOFRAN-ODT) 4 MG ODT tab Take 1 tablet (4 mg) by mouth every 8 hours as needed for nausea 12 tablet 1     SUMAtriptan (IMITREX) 50 MG tablet Take 1  tablet (50 mg) by mouth at onset of headache for migraine May repeat every  2 hours until symptoms resolved. Max 4 tablets/24 hours. 30 tablet 1     Allergies   Allergen Reactions     Lisinopril      Recent Labs   Lab Test 04/11/19  0944 03/01/18  0920 11/22/16  0854   LDL  --   --  110*   HDL  --   --  48*   TRIG  --   --  148   ALT 34  --   --    CR 0.65 0.65 0.74   GFRESTIMATED >90 >90 84   GFRESTBLACK >90 >90 >90  African American GFR Calc     POTASSIUM 3.6 3.9 4.5      BP Readings from Last 3 Encounters:   03/06/20 138/88   03/05/20 (!) 146/92   01/14/20 132/80    Wt Readings from Last 3 Encounters:   03/06/20 70.8 kg (156 lb)   03/05/20 70.8 kg (156 lb)   01/14/20 70.8 kg (156 lb)                    Reviewed and updated as needed this visit by Provider         Review of Systems   ROS COMP: Constitutional, HEENT, cardiovascular, pulmonary, gi and gu systems are negative, except as otherwise noted.       Objective   Reported vitals:  There were no vitals taken for this visit.   alert and no distress  Psych: Alert and oriented times 3; coherent speech, normal   rate and volume, able to articulate logical thoughts, able   to abstract reason, no tangential thoughts, no hallucinations   or delusions       Diagnostic Test Results:     none         Assessment/Plan:  1. Epigastric pain  Ddx is broad and includes peptic ulcer disease, gastritis, biliary colic, cholecystitis, cholangitis, GERD, hepatitis, pancreatitis, etc  Suspect gastritis.  Does use NSAIDs frequently to manage her headaches.  Advised avoiding NSAIDs, aspirin for now until her symptoms improve.  I also advised avoiding alcohol, caffeine or acidic foods.  She continues the Pepcid, which has provided some temporary relief.  We will also start omeprazole 20 mg daily for the next two weeks.  She agrees to let us know if symptoms are worsening or not improving.  If she develops severe abdominal pain or fever accompanying her pain she will go to the  emergency room.    2. Lower abdominal pain  Suspect secondary to constipation.  She started MiraLAX yesterday.  She has not had a bowel movement in 2 days.  The lower pain is mild.  She will increase hydration and continued MiraLAX.  She may also try a tap water enema. She will let us know if worsening pain, inability pass gas or stool.          No follow-ups on file.    9:28 AM    Phone call duration:  31 minutes    Sia White MD

## 2020-04-01 NOTE — TELEPHONE ENCOUNTER
Reviewed 4/1/2020 [employer] message.  Talked to pt.  No relief yest.  Pepcid/Tyl not lasting for help.  Pain is worsening. abd pain.  Now LLQ pain.  H/a.    PT has not called her OBGYN. She felt they wouldn't help with this issue.  Pt made telephone appt for today.  NEELAM Sabillon

## 2020-05-06 DIAGNOSIS — N93.9 ABNORMAL UTERINE BLEEDING: Primary | ICD-10-CM

## 2020-05-06 DIAGNOSIS — Z80.49 FAMILY HISTORY OF MALIGNANT NEOPLASM OF ENDOMETRIUM: ICD-10-CM

## 2020-05-15 ENCOUNTER — TELEPHONE (OUTPATIENT)
Dept: OBGYN | Facility: CLINIC | Age: 55
End: 2020-05-15

## 2020-05-19 DIAGNOSIS — Z11.59 ENCOUNTER FOR SCREENING FOR OTHER VIRAL DISEASES: Primary | ICD-10-CM

## 2020-05-19 PROBLEM — Z80.49 FAMILY HISTORY OF MALIGNANT NEOPLASM OF ENDOMETRIUM: Status: ACTIVE | Noted: 2020-05-19

## 2020-05-19 PROBLEM — N93.9 ABNORMAL UTERINE BLEEDING: Status: ACTIVE | Noted: 2020-05-19

## 2020-05-19 NOTE — TELEPHONE ENCOUNTER
Type of surgery: gyn  Location of surgery: [facility]  Date and time of surgery: 6/15/20 100p  Surgeon: David  Pre-Op Appt Date: PCP, patient will call to schedule  Post-Op Appt Date: tbd   Packet sent out: Yes  Pre-cert/Authorization completed:  Not Applicable  Date: 05/19/20  Gina Sepulveda,

## 2020-05-21 ENCOUNTER — VIRTUAL VISIT (OUTPATIENT)
Dept: OBGYN | Facility: CLINIC | Age: 55
End: 2020-05-21
Payer: COMMERCIAL

## 2020-05-21 DIAGNOSIS — N93.9 ABNORMAL UTERINE BLEEDING: Primary | ICD-10-CM

## 2020-05-21 DIAGNOSIS — Z80.49 FAMILY HISTORY OF MALIGNANT NEOPLASM OF ENDOMETRIUM: ICD-10-CM

## 2020-05-21 PROCEDURE — 99213 OFFICE O/P EST LOW 20 MIN: CPT | Mod: 95 | Performed by: OBSTETRICS & GYNECOLOGY

## 2020-05-21 NOTE — PROGRESS NOTES
"Augustus Mcgrath is a 54 year old female who is being evaluated via a billable telephone visit.      The patient has been notified of following:     \"This telephone visit will be conducted via a call between you and your physician/provider. We have found that certain health care needs can be provided without the need for a physical exam.  This service lets us provide the care you need with a short phone conversation.  If a prescription is necessary we can send it directly to your pharmacy.  If lab work is needed we can place an order for that and you can then stop by our lab to have the test done at a later time.    Telephone visits are billed at different rates depending on your insurance coverage. During this emergency period, for some insurers they may be billed the same as an in-person visit.  Please reach out to your insurance provider with any questions.    If during the course of the call the physician/provider feels a telephone visit is not appropriate, you will not be charged for this service.\"    Patient has given verbal consent for Telephone visit?  Yes    What phone number would you like to be contacted at? [phone]    How would you like to obtain your AV[facility]? [facility]    Phone call duration: 20 minutes    Wendy Hernandez MD      "

## 2020-05-21 NOTE — PROGRESS NOTES
CC:  preop hysteroscopy  HPI: Augustus Mcgrath is a 54 year old female   No LMP recorded. (Menstrual status: Irregular Periods).  Prior clinic notes from 2020 are reviewed at today's visit.       First saw Augustus in 2020 for irregular bleeding in trevor-menopause.  Her family history is notable for endometrial cancer.  Endometrial biopsy was performed but unfortunately, pathology showed only endocervical tissue, negative for endometrial tissue.  As such, hysteroscopy was recommend.  Due to delays in surgery from [facility], the surgery has not been performed yet, but is now scheduled for 6/15/2020.    Augustus has looked up the proposed procedure online and largely only has questions about morcellation.  Has been otherwise feeling well.  Denies any personal or family history of complications with anesthesia.      Past Medical History:   Diagnosis Date     ASCUS of cervix with negative high risk HPV 2018    3/1/18 ASCUS, Neg HPV     Contraception      has vasectomy     Eye problems     right eye, retinal detachment, LEFT EY PROBLEMATIC AS WELL     Hypertension 2017     Iron deficiency anemia due to chronic blood loss 2016       Past Surgical History:   Procedure Laterality Date     retinal surgery      scleral buckle and several lasers both eyes     VITRECTOMY ANTERIOR  13    [facility]       Family History   Problem Relation Age of Onset     Hypertension Mother      Heart Disease Mother 85     Heart Failure Mother          of renal failure related to cardiac testing     Lipids Father      Heart Disease Father 85         suddenly while doing yardwork     Hyperlipidemia Father      Cerebrovascular Disease Maternal Grandfather      Cerebrovascular Disease Paternal Grandfather      Thyroid Disease Sister         hypo     Breast Cancer Sister 52     Hypertension Sister 52     Thyroid Disease Sister      Uterine Cancer Sister      Current Outpatient  Medications   Medication Sig Dispense Refill     hydrochlorothiazide (HYDRODIURIL) 25 MG tablet Take 1 tablet (25 mg) by mouth daily 90 tablet 3     metoclopramide (REGLAN) 10 MG tablet Take 1 tablet (10 mg) by mouth 4 times daily as needed (nausea) 30 tablet 3     ondansetron (ZOFRAN-ODT) 4 MG ODT tab Take 1 tablet (4 mg) by mouth every 8 hours as needed for nausea 12 tablet 1     SUMAtriptan (IMITREX) 50 MG tablet Take 1 tablet (50 mg) by mouth at onset of headache for migraine May repeat every  2 hours until symptoms resolved. Max 4 tablets/24 hours. 30 tablet 1     norethindrone (AYGESTIN) 5 MG tablet Take 2 tablets daily until the bleeding stops.  Then take 1 tablet daily for another 5 days. (Patient not taking: Reported on 4/1/2020) 30 tablet 3       Allergies: Lisinopril    Exam deferred due to phone visit    ASSESSMENT/PLAN:  1. Abnormal uterine bleeding  2. Family history of malignant neoplasm of endometrium    Discussed general steps of the procedure.  Discussed procedure as both diagnostic and therpeautic (would remove any fibroids or polyps if seen on hysteroscopy).      Patient read about morcellators and is concerned.  Reviewed that the controversy and concern about morcellations is about laparoscopic or open power morcellators, not the hysteroscopic morcellator.  Explained that morcellator is a fairly general term, but reassured her the specific type of morcellator and procedure I am recommending is commonly done and very safe.    Discussed risks of the procedure, which include bleeding, infection, uterine perforation, cervical laceration, incomplete procedure, injury to other organs, VTE, risks of anesthesia.    She will have pre-op clearance with her PCP prior to proceeding to the OR.    All questions answered to Augustus's apparent satisfaction.    Wendy Hernandez MD    Phone visit due to COVID-19 pandemic.  Duration of call:  20 minutes

## 2020-06-01 ENCOUNTER — OFFICE VISIT (OUTPATIENT)
Dept: FAMILY MEDICINE | Facility: CLINIC | Age: 55
End: 2020-06-01
Payer: COMMERCIAL

## 2020-06-01 VITALS
BODY MASS INDEX: 27.02 KG/M2 | OXYGEN SATURATION: 98 % | SYSTOLIC BLOOD PRESSURE: 130 MMHG | WEIGHT: 143 LBS | RESPIRATION RATE: 19 BRPM | HEART RATE: 99 BPM | DIASTOLIC BLOOD PRESSURE: 84 MMHG | TEMPERATURE: 98.2 F

## 2020-06-01 DIAGNOSIS — Z01.818 PREOP GENERAL PHYSICAL EXAM: Primary | ICD-10-CM

## 2020-06-01 DIAGNOSIS — N93.9 ABNORMAL UTERINE BLEEDING (AUB): ICD-10-CM

## 2020-06-01 DIAGNOSIS — I10 HYPERTENSION, GOAL BELOW 140/90: ICD-10-CM

## 2020-06-01 PROCEDURE — 99214 OFFICE O/P EST MOD 30 MIN: CPT | Performed by: NURSE PRACTITIONER

## 2020-06-01 NOTE — PROGRESS NOTES
[facility] [address] [place] [address]  [phone]  Dept: [phone]    PRE-OP EVALUATION:  Today's date: 2020    Augustus Mcgrath (: 1965) presents for pre-operative evaluation assessment as requested by Wendy Colón.  She requires evaluation and anesthesia risk assessment prior to undergoing surgery/procedure for treatment of Hysteroscopy with morcellator.    Proposed Surgery/ Procedure: Abnormal uterine bleeding  Date of Surgery/ Procedure: 6/15/2020  Time of Surgery/ Procedure: 1:00 p.m  Hospital/Surgical Facility: [facility]  Fax number for surgical facility: electronically   Primary Physician: Sharlene Baltazar  Type of Anesthesia Anticipated: to be determined    Patient has a Health Care Directive or Living Will:  NO    1. NO - Do you have a history of heart attack, stroke, stent, bypass or surgery on an artery in the head, neck, heart or legs?  2. NO - Do you ever have any pain or discomfort in your chest?  3. NO - Do you have a history of  Heart Failure?  4. NO - Are you troubled by shortness of breath when: walking on the level, up a slight hill or at night?  5. NO - Do you currently have a cold, bronchitis or other respiratory infection?  6. NO - Do you have a cough, shortness of breath or wheezing?  7. NO - Do you sometimes get pains in the calves of your legs when you walk?  8. NO - Do you or anyone in your family have previous history of blood clots?  9. NO - Do you or does anyone in your family have a serious bleeding problem such as prolonged bleeding following surgeries or cuts?  10. YES - Have you ever had problems with anemia or been told to take iron pills?  11. YES- Have you had any abnormal blood loss such as black, tarry or bloody stools, or abnormal vaginal bleeding?  12. NO - Have you ever had a blood transfusion?  13. NO - HAVE YOU OR ANY OF YOUR RELATIVES EVER HAD PROBLEMS WITH ANESTHESIA?   14. NO - DO YOU  HAVE SLEEP APNEA, EXCESSIVE SNORING OR DAYTIME DROWSINESS?   15. NO - Do you have any prosthetic heart valves?  16. NO - Do you have prosthetic joints?  17. NO - Is there any chance that you may be pregnant?      HPI:     HPI related to upcoming procedure: Abnormal uterine bleeding, endometrial biopsy was non-diagnostic.       HYPERTENSION - Patient has longstanding history of HTN , currently denies any symptoms referable to elevated blood pressure. Specifically denies chest pain, palpitations, dyspnea, orthopnea, PND or peripheral edema. Blood pressure readings have been in normal range. Current medication regimen is as listed below. Patient denies any side effects of medication.       MEDICAL HISTORY:     Patient Active Problem List    Diagnosis Date Noted     Abnormal uterine bleeding 05/19/2020     Priority: Medium     Added automatically from request for surgery [id number]       Family history of malignant neoplasm of endometrium 05/19/2020     Priority: Medium     Added automatically from request for surgery [id number]       Migraine with aura 01/02/2019     Priority: Medium     Sometimes gets migraine equivalent       ASCUS of cervix with negative high risk HPV 03/01/2018     Priority: Medium     3/1/18 ASCUS, Neg HPV. Plan 3 yr co-test       Hypertension, goal below 140/90 08/01/2017     Priority: Medium     Iron deficiency anemia due to chronic blood loss 11/28/2016     Priority: Medium     Need for hepatitis C screening test 11/22/2016     Priority: Medium     Enthesopathy 08/22/2013     Priority: Medium     Problem list name updated by automated process. Provider to review       Menorrhagia 11/02/2009     Priority: Medium      Past Medical History:   Diagnosis Date     ASCUS of cervix with negative high risk HPV 03/01/2018    3/1/18 ASCUS, Neg HPV     Contraception      has vasectomy     Eye problems 1996    right eye, retinal detachment, LEFT EY PROBLEMATIC AS WELL     Hypertension 2017     Iron  deficiency anemia due to chronic blood loss 2016     Past Surgical History:   Procedure Laterality Date     retinal surgery  1986    scleral buckle and several lasers both eyes     VITRECTOMY ANTERIOR  13    [facility]     Current Outpatient Medications   Medication Sig Dispense Refill     hydrochlorothiazide (HYDRODIURIL) 25 MG tablet Take 1 tablet (25 mg) by mouth daily 90 tablet 3     metoclopramide (REGLAN) 10 MG tablet Take 1 tablet (10 mg) by mouth 4 times daily as needed (nausea) (Patient not taking: Reported on 2020) 30 tablet 3     norethindrone (AYGESTIN) 5 MG tablet Take 2 tablets daily until the bleeding stops.  Then take 1 tablet daily for another 5 days. (Patient not taking: Reported on 2020) 30 tablet 3     ondansetron (ZOFRAN-ODT) 4 MG ODT tab Take 1 tablet (4 mg) by mouth every 8 hours as needed for nausea (Patient not taking: Reported on 2020) 12 tablet 1     SUMAtriptan (IMITREX) 50 MG tablet Take 1 tablet (50 mg) by mouth at onset of headache for migraine May repeat every  2 hours until symptoms resolved. Max 4 tablets/24 hours. (Patient not taking: Reported on 2020) 30 tablet 1     OTC products: None, except as noted above    Allergies   Allergen Reactions     Lisinopril       Latex Allergy: NO    Social History     Tobacco Use     Smoking status: Former Smoker     Packs/day: 0.50     Years: 17.00     Pack years: 8.50     Types: Cigarettes     Last attempt to quit: 11/10/2010     Years since quittin.5     Smokeless tobacco: Never Used     Tobacco comment: occ   Substance Use Topics     Alcohol use: Yes     Comment: 2 drinks a week      History   Drug Use No       REVIEW OF SYSTEMS:   CONSTITUTIONAL: NEGATIVE for fever, chills, change in weight  INTEGUMENTARY/SKIN: NEGATIVE for worrisome rashes, moles or lesions  EYES: NEGATIVE for vision changes or irritation  ENT/MOUTH: NEGATIVE for ear, mouth and throat problems  RESP: NEGATIVE for significant cough or  SOB  BREAST: NEGATIVE for masses, tenderness or discharge  CV: NEGATIVE for chest pain, palpitations or peripheral edema  GI: NEGATIVE for nausea, abdominal pain, heartburn, or change in bowel habits  : NEGATIVE for frequency, dysuria, or hematuria  MUSCULOSKELETAL: NEGATIVE for significant arthralgias or myalgia  NEURO: NEGATIVE for weakness, dizziness or paresthesias  ENDOCRINE: NEGATIVE for temperature intolerance, skin/hair changes  HEME: NEGATIVE for bleeding problems  PSYCHIATRIC: NEGATIVE for changes in mood or affect    EXAM:   /84 (BP Location: Left arm, Patient Position: Sitting, Cuff Size: Adult Regular)   Pulse 99   Temp 98.2  F (36.8  C) (Oral)   Resp 19   Wt 64.9 kg (143 lb)   SpO2 98%   BMI 27.02 kg/m      GENERAL APPEARANCE: healthy, alert and no distress     EYES: EOMI, PERRL     HENT: ear canals and TM's normal and nose and mouth without ulcers or lesions     NECK: no adenopathy, no asymmetry, masses, or scars and thyroid normal to palpation     RESP: lungs clear to auscultation - no rales, rhonchi or wheezes     CV: regular rates and rhythm, normal S1 S2, no S3 or S4 and no murmur, click or rub     ABDOMEN:  soft, nontender, no HSM or masses and bowel sounds normal     MS: extremities normal- no gross deformities noted, no evidence of inflammation in joints, FROM in all extremities.     SKIN: no suspicious lesions or rashes     NEURO: Normal strength and tone, sensory exam grossly normal, mentation intact and speech normal     PSYCH: mentation appears normal. and affect normal/bright     LYMPHATICS: No cervical adenopathy    DIAGNOSTICS:   Labs are pended - she has a lab appointment on 6/12    Recent Labs   Lab Test 03/06/20  1357 04/11/19  0944 03/01/18  0920   HGB 12.9 12.4 10.7*    273 276   NA  --  142 138   POTASSIUM  --  3.6 3.9   CR  --  0.65 0.65        IMPRESSION:   Reason for surgery/procedure: Hysteroscopy with morcellator.      The proposed surgical procedure is  considered INTERMEDIATE risk.    REVISED CARDIAC RISK INDEX  The patient has the following serious cardiovascular risks for perioperative complications such as (MI, PE, VFib and 3  AV Block):  No serious cardiac risks  INTERPRETATION: 0 risks: Class I (very low risk - 0.4% complication rate)    The patient has the following additional risks for perioperative complications:  No identified additional risks      ICD-10-CM    1. Preop general physical exam  Z01.818    2. Abnormal uterine bleeding (AUB)  N93.9    3. Hypertension, goal below 140/90  I10        RECOMMENDATIONS:         --Patient is to take all scheduled medications on the day of surgery EXCEPT for modifications listed below.    APPROVAL GIVEN to proceed with proposed procedure, without further diagnostic evaluation       Signed Electronically by: Donita Rodriguez NP    Copy of this evaluation report is provided to requesting physician.    [facility] Preop Guidelines    Revised Cardiac Risk Index

## 2020-06-12 DIAGNOSIS — N93.9 ABNORMAL UTERINE BLEEDING: ICD-10-CM

## 2020-06-12 DIAGNOSIS — Z01.818 PRE-OP EXAM: ICD-10-CM

## 2020-06-12 DIAGNOSIS — I10 HYPERTENSION, GOAL BELOW 140/90: ICD-10-CM

## 2020-06-12 LAB
ABO + RH BLD: NORMAL
ABO + RH BLD: NORMAL
BLD GP AB SCN SERPL QL: NORMAL
BLOOD BANK CMNT PATIENT-IMP: NORMAL
ERYTHROCYTE [DISTWIDTH] IN BLOOD BY AUTOMATED COUNT: 20.5 % (ref 10–15)
HCT VFR BLD AUTO: 34.2 % (ref 35–47)
HGB BLD-MCNC: 9.9 G/DL (ref 11.7–15.7)
MCH RBC QN AUTO: 19.3 PG (ref 26.5–33)
MCHC RBC AUTO-ENTMCNC: 28.9 G/DL (ref 31.5–36.5)
MCV RBC AUTO: 67 FL (ref 78–100)
PLATELET # BLD AUTO: 328 10E9/L (ref 150–450)
RBC # BLD AUTO: 5.14 10E12/L (ref 3.8–5.2)
SPECIMEN EXP DATE BLD: NORMAL
WBC # BLD AUTO: 6.8 10E9/L (ref 4–11)

## 2020-06-12 PROCEDURE — 36415 COLL VENOUS BLD VENIPUNCTURE: CPT | Performed by: OBSTETRICS & GYNECOLOGY

## 2020-06-12 PROCEDURE — 86901 BLOOD TYPING SEROLOGIC RH(D): CPT | Performed by: OBSTETRICS & GYNECOLOGY

## 2020-06-12 PROCEDURE — 86900 BLOOD TYPING SEROLOGIC ABO: CPT | Performed by: OBSTETRICS & GYNECOLOGY

## 2020-06-12 PROCEDURE — 86850 RBC ANTIBODY SCREEN: CPT | Performed by: OBSTETRICS & GYNECOLOGY

## 2020-06-12 PROCEDURE — 85027 COMPLETE CBC AUTOMATED: CPT | Performed by: OBSTETRICS & GYNECOLOGY

## 2020-06-12 PROCEDURE — 80048 BASIC METABOLIC PNL TOTAL CA: CPT | Performed by: OBSTETRICS & GYNECOLOGY

## 2020-06-13 DIAGNOSIS — Z11.59 ENCOUNTER FOR SCREENING FOR OTHER VIRAL DISEASES: ICD-10-CM

## 2020-06-13 LAB
ANION GAP SERPL CALCULATED.3IONS-SCNC: 7 MMOL/L (ref 3–14)
BUN SERPL-MCNC: 15 MG/DL (ref 7–30)
CALCIUM SERPL-MCNC: 10.1 MG/DL (ref 8.5–10.1)
CHLORIDE SERPL-SCNC: 110 MMOL/L (ref 94–109)
CO2 SERPL-SCNC: 22 MMOL/L (ref 20–32)
CREAT SERPL-MCNC: 0.69 MG/DL (ref 0.52–1.04)
GFR SERPL CREATININE-BSD FRML MDRD: >90 ML/MIN/{1.73_M2}
GLUCOSE SERPL-MCNC: 111 MG/DL (ref 70–99)
POTASSIUM SERPL-SCNC: 4 MMOL/L (ref 3.4–5.3)
SODIUM SERPL-SCNC: 139 MMOL/L (ref 133–144)

## 2020-06-13 PROCEDURE — 99207 ZZC NO BILLABLE SERVICE THIS VISIT: CPT

## 2020-06-13 PROCEDURE — 99000 SPECIMEN HANDLING OFFICE-LAB: CPT | Performed by: OBSTETRICS & GYNECOLOGY

## 2020-06-13 PROCEDURE — U0003 INFECTIOUS AGENT DETECTION BY NUCLEIC ACID (DNA OR RNA); SEVERE ACUTE RESPIRATORY SYNDROME CORONAVIRUS 2 (SARS-COV-2) (CORONAVIRUS DISEASE [COVID-19]), AMPLIFIED PROBE TECHNIQUE, MAKING USE OF HIGH THROUGHPUT TECHNOLOGIES AS DESCRIBED BY CMS-2020-01-R: HCPCS | Mod: 90 | Performed by: OBSTETRICS & GYNECOLOGY

## 2020-06-14 LAB
SARS-COV-2 RNA SPEC QL NAA+PROBE: NOT DETECTED
SPECIMEN SOURCE: NORMAL

## 2020-06-15 ENCOUNTER — ANESTHESIA (OUTPATIENT)
Dept: SURGERY | Facility: CLINIC | Age: 55
End: 2020-06-15
Payer: COMMERCIAL

## 2020-06-15 ENCOUNTER — ANESTHESIA EVENT (OUTPATIENT)
Dept: SURGERY | Facility: CLINIC | Age: 55
End: 2020-06-15
Payer: COMMERCIAL

## 2020-06-15 ENCOUNTER — HOSPITAL ENCOUNTER (OUTPATIENT)
Facility: CLINIC | Age: 55
Discharge: HOME | End: 2020-06-15
Attending: OBSTETRICS & GYNECOLOGY | Admitting: OBSTETRICS & GYNECOLOGY
Payer: COMMERCIAL

## 2020-06-15 VITALS
BODY MASS INDEX: 27.93 KG/M2 | SYSTOLIC BLOOD PRESSURE: 160 MMHG | RESPIRATION RATE: 14 BRPM | OXYGEN SATURATION: 100 % | WEIGHT: 147.93 LBS | HEART RATE: 82 BPM | DIASTOLIC BLOOD PRESSURE: 87 MMHG | TEMPERATURE: 98.6 F | HEIGHT: 61 IN

## 2020-06-15 DIAGNOSIS — Z80.49 FAMILY HISTORY OF MALIGNANT NEOPLASM OF ENDOMETRIUM: ICD-10-CM

## 2020-06-15 DIAGNOSIS — N93.9 ABNORMAL UTERINE BLEEDING: ICD-10-CM

## 2020-06-15 LAB
GLUCOSE SERPL-MCNC: 90 MG/DL (ref 70–99)
HCG UR QL: NEGATIVE
HGB BLD-MCNC: 10 G/DL (ref 11.7–15.7)

## 2020-06-15 PROCEDURE — 36415 COLL VENOUS BLD VENIPUNCTURE: CPT | Performed by: ANESTHESIOLOGY

## 2020-06-15 PROCEDURE — 25000128 H RX IP 250 OP 636: Performed by: NURSE ANESTHETIST, CERTIFIED REGISTERED

## 2020-06-15 PROCEDURE — 40000170 ZZH STATISTIC PRE-PROCEDURE ASSESSMENT II: Performed by: OBSTETRICS & GYNECOLOGY

## 2020-06-15 PROCEDURE — 71000027 ZZH RECOVERY PHASE 2 EACH 15 MINS: Performed by: OBSTETRICS & GYNECOLOGY

## 2020-06-15 PROCEDURE — 37000008 ZZH ANESTHESIA TECHNICAL FEE, 1ST 30 MIN: Performed by: OBSTETRICS & GYNECOLOGY

## 2020-06-15 PROCEDURE — 37000009 ZZH ANESTHESIA TECHNICAL FEE, EACH ADDTL 15 MIN: Performed by: OBSTETRICS & GYNECOLOGY

## 2020-06-15 PROCEDURE — 25000128 H RX IP 250 OP 636: Performed by: ANESTHESIOLOGY

## 2020-06-15 PROCEDURE — 82947 ASSAY GLUCOSE BLOOD QUANT: CPT | Performed by: ANESTHESIOLOGY

## 2020-06-15 PROCEDURE — 88305 TISSUE EXAM BY PATHOLOGIST: CPT | Mod: 26 | Performed by: OBSTETRICS & GYNECOLOGY

## 2020-06-15 PROCEDURE — 25800030 ZZH RX IP 258 OP 636: Performed by: ANESTHESIOLOGY

## 2020-06-15 PROCEDURE — 25000132 ZZH RX MED GY IP 250 OP 250 PS 637: Performed by: OBSTETRICS & GYNECOLOGY

## 2020-06-15 PROCEDURE — 27210794 ZZH OR GENERAL SUPPLY STERILE: Performed by: OBSTETRICS & GYNECOLOGY

## 2020-06-15 PROCEDURE — 25000125 ZZHC RX 250: Performed by: OBSTETRICS & GYNECOLOGY

## 2020-06-15 PROCEDURE — 25000125 ZZHC RX 250: Performed by: NURSE ANESTHETIST, CERTIFIED REGISTERED

## 2020-06-15 PROCEDURE — 36000059 ZZH SURGERY LEVEL 3 EA 15 ADDTL MIN UMMC: Performed by: OBSTETRICS & GYNECOLOGY

## 2020-06-15 PROCEDURE — 88305 TISSUE EXAM BY PATHOLOGIST: CPT | Performed by: OBSTETRICS & GYNECOLOGY

## 2020-06-15 PROCEDURE — 58561 HYSTEROSCOPY REMOVE MYOMA: CPT | Performed by: OBSTETRICS & GYNECOLOGY

## 2020-06-15 PROCEDURE — 81025 URINE PREGNANCY TEST: CPT | Performed by: OBSTETRICS & GYNECOLOGY

## 2020-06-15 PROCEDURE — 36000057 ZZH SURGERY LEVEL 3 1ST 30 MIN - UMMC: Performed by: OBSTETRICS & GYNECOLOGY

## 2020-06-15 PROCEDURE — 85018 HEMOGLOBIN: CPT | Performed by: ANESTHESIOLOGY

## 2020-06-15 RX ORDER — PROPOFOL 10 MG/ML
INJECTION, EMULSION INTRAVENOUS CONTINUOUS PRN
Status: DISCONTINUED | OUTPATIENT
Start: 2020-06-15 | End: 2020-06-15

## 2020-06-15 RX ORDER — LIDOCAINE HYDROCHLORIDE 10 MG/ML
INJECTION, SOLUTION EPIDURAL; INFILTRATION; INTRACAUDAL; PERINEURAL PRN
Status: DISCONTINUED | OUTPATIENT
Start: 2020-06-15 | End: 2020-06-15 | Stop reason: HOSPADM

## 2020-06-15 RX ORDER — LIDOCAINE 40 MG/G
CREAM TOPICAL
Status: DISCONTINUED | OUTPATIENT
Start: 2020-06-15 | End: 2020-06-15 | Stop reason: HOSPADM

## 2020-06-15 RX ORDER — ACETAMINOPHEN 325 MG/1
975 TABLET ORAL EVERY 8 HOURS PRN
Qty: 50 TABLET | Refills: 0 | Status: SHIPPED | OUTPATIENT
Start: 2020-06-15 | End: 2020-12-02

## 2020-06-15 RX ORDER — ONDANSETRON 4 MG/1
4 TABLET, ORALLY DISINTEGRATING ORAL
Status: DISCONTINUED | OUTPATIENT
Start: 2020-06-15 | End: 2020-06-15 | Stop reason: HOSPADM

## 2020-06-15 RX ORDER — MEPERIDINE HYDROCHLORIDE 25 MG/ML
12.5 INJECTION INTRAMUSCULAR; INTRAVENOUS; SUBCUTANEOUS
Status: DISCONTINUED | OUTPATIENT
Start: 2020-06-15 | End: 2020-06-15 | Stop reason: HOSPADM

## 2020-06-15 RX ORDER — ONDANSETRON 4 MG/1
4 TABLET, ORALLY DISINTEGRATING ORAL EVERY 30 MIN PRN
Status: DISCONTINUED | OUTPATIENT
Start: 2020-06-15 | End: 2020-06-15 | Stop reason: HOSPADM

## 2020-06-15 RX ORDER — ACETAMINOPHEN 325 MG/1
975 TABLET ORAL
Status: DISCONTINUED | OUTPATIENT
Start: 2020-06-15 | End: 2020-06-15 | Stop reason: HOSPADM

## 2020-06-15 RX ORDER — FENTANYL CITRATE 50 UG/ML
INJECTION, SOLUTION INTRAMUSCULAR; INTRAVENOUS PRN
Status: DISCONTINUED | OUTPATIENT
Start: 2020-06-15 | End: 2020-06-15

## 2020-06-15 RX ORDER — LIDOCAINE HYDROCHLORIDE 20 MG/ML
INJECTION, SOLUTION INFILTRATION; PERINEURAL PRN
Status: DISCONTINUED | OUTPATIENT
Start: 2020-06-15 | End: 2020-06-15

## 2020-06-15 RX ORDER — FENTANYL CITRATE 50 UG/ML
25-50 INJECTION, SOLUTION INTRAMUSCULAR; INTRAVENOUS
Status: DISCONTINUED | OUTPATIENT
Start: 2020-06-15 | End: 2020-06-15 | Stop reason: HOSPADM

## 2020-06-15 RX ORDER — IBUPROFEN 600 MG/1
600 TABLET, FILM COATED ORAL EVERY 6 HOURS PRN
Qty: 30 TABLET | Refills: 0 | Status: SHIPPED | OUTPATIENT
Start: 2020-06-15 | End: 2020-12-02

## 2020-06-15 RX ORDER — SODIUM CHLORIDE, SODIUM LACTATE, POTASSIUM CHLORIDE, CALCIUM CHLORIDE 600; 310; 30; 20 MG/100ML; MG/100ML; MG/100ML; MG/100ML
INJECTION, SOLUTION INTRAVENOUS CONTINUOUS
Status: DISCONTINUED | OUTPATIENT
Start: 2020-06-15 | End: 2020-06-15 | Stop reason: HOSPADM

## 2020-06-15 RX ORDER — FENTANYL CITRATE 50 UG/ML
25-50 INJECTION, SOLUTION INTRAMUSCULAR; INTRAVENOUS EVERY 5 MIN PRN
Status: DISCONTINUED | OUTPATIENT
Start: 2020-06-15 | End: 2020-06-15 | Stop reason: HOSPADM

## 2020-06-15 RX ORDER — ONDANSETRON 2 MG/ML
4 INJECTION INTRAMUSCULAR; INTRAVENOUS EVERY 30 MIN PRN
Status: DISCONTINUED | OUTPATIENT
Start: 2020-06-15 | End: 2020-06-15 | Stop reason: HOSPADM

## 2020-06-15 RX ORDER — NALOXONE HYDROCHLORIDE 0.4 MG/ML
.1-.4 INJECTION, SOLUTION INTRAMUSCULAR; INTRAVENOUS; SUBCUTANEOUS
Status: DISCONTINUED | OUTPATIENT
Start: 2020-06-15 | End: 2020-06-15 | Stop reason: HOSPADM

## 2020-06-15 RX ORDER — KETOROLAC TROMETHAMINE 30 MG/ML
INJECTION, SOLUTION INTRAMUSCULAR; INTRAVENOUS PRN
Status: DISCONTINUED | OUTPATIENT
Start: 2020-06-15 | End: 2020-06-15

## 2020-06-15 RX ADMIN — SODIUM CHLORIDE, POTASSIUM CHLORIDE, SODIUM LACTATE AND CALCIUM CHLORIDE: 600; 310; 30; 20 INJECTION, SOLUTION INTRAVENOUS at 14:40

## 2020-06-15 RX ADMIN — ONDANSETRON 4 MG: 4 TABLET, ORALLY DISINTEGRATING ORAL at 17:05

## 2020-06-15 RX ADMIN — FENTANYL CITRATE 25 MCG: 50 INJECTION, SOLUTION INTRAMUSCULAR; INTRAVENOUS at 14:59

## 2020-06-15 RX ADMIN — FENTANYL CITRATE 25 MCG: 50 INJECTION, SOLUTION INTRAMUSCULAR; INTRAVENOUS at 14:48

## 2020-06-15 RX ADMIN — FENTANYL CITRATE 25 MCG: 50 INJECTION, SOLUTION INTRAMUSCULAR; INTRAVENOUS at 15:47

## 2020-06-15 RX ADMIN — PROPOFOL 100 MCG/KG/MIN: 10 INJECTION, EMULSION INTRAVENOUS at 14:51

## 2020-06-15 RX ADMIN — KETOROLAC TROMETHAMINE 30 MG: 30 INJECTION, SOLUTION INTRAMUSCULAR at 15:32

## 2020-06-15 RX ADMIN — LIDOCAINE HYDROCHLORIDE 40 MG: 20 INJECTION, SOLUTION INFILTRATION; PERINEURAL at 14:48

## 2020-06-15 RX ADMIN — ACETAMINOPHEN 975 MG: 325 TABLET, FILM COATED ORAL at 16:59

## 2020-06-15 RX ADMIN — FENTANYL CITRATE 25 MCG: 50 INJECTION, SOLUTION INTRAMUSCULAR; INTRAVENOUS at 15:04

## 2020-06-15 RX ADMIN — MIDAZOLAM 2 MG: 1 INJECTION INTRAMUSCULAR; INTRAVENOUS at 14:40

## 2020-06-15 ASSESSMENT — MIFFLIN-ST. JEOR: SCORE: 1218.38

## 2020-06-15 ASSESSMENT — ENCOUNTER SYMPTOMS: APNEA: 0

## 2020-06-15 NOTE — ANESTHESIA POSTPROCEDURE EVALUATION
Anesthesia POST Procedure Evaluation    Patient: Augustus Mcgrath   MRN:     [id number] Gender:   female   Age:    54 year old :      1965        Preoperative Diagnosis: Abnormal uterine bleeding [N93.9]  Family history of malignant neoplasm of endometrium [Z80.49]   Procedure(s):  Hysteroscopy with morcellator   Postop Comments: No value filed.     Anesthesia Type: MAC       Disposition: Outpatient   Postop Pain Control: Uneventful            Sign Out: Well controlled pain   PONV: No   Neuro/Psych: Uneventful            Sign Out: Acceptable/Baseline neuro status   Airway/Respiratory: Uneventful            Sign Out: AIRWAY IN SITU/Resp. Support   CV/Hemodynamics: Uneventful            Sign Out: Acceptable CV status   Other NRE: NONE   DID A NON-ROUTINE EVENT OCCUR? No    Event details/Postop Comments:  Patient doing well. Alert. No nausea. Has some surgical discomfort, which is managed with prn pain medications. Otherwise she has no concerns.          Last Anesthesia Record Vitals:  CRNA VITALS  6/15/2020 1506 - 6/15/2020 1559      6/15/2020             Pulse:  74    SpO2:  100 %          Last PACU Vitals:  Vitals Value Taken Time   BP     Temp     Pulse     Resp     SpO2     Temp src     NIBP 139/108 6/15/2020  3:34 PM   Pulse 74 6/15/2020  3:37 PM   SpO2 100 % 6/15/2020  3:37 PM   Resp     Temp 36.7  C (98.1  F) 6/15/2020  3:34 PM   Ht Rate 68 6/15/2020  3:35 PM   Temp 2           Electronically Signed By: Aniyah Abernathy MD, Amarilis 15, 2020, 3:59 PM

## 2020-06-15 NOTE — PROCEDURES
OPERATIVE REPORT    Patient: Augustus Mcgrath   : 1965  MRN: [id number]    Date of Service: 6/15/2020     Pre-operative diagnosis: abnormal uterine bleeding, family history of endometrial cancer  Post-operative diagnosis: same, and polyp vs fibroid  Procedure: Hysteroscopy with morcellator  Surgeon: Wendy Hernandez MD   Anesthesia: monitored anesthesia care withIV sedation and local anesthetic  Anesthesiologist: Dr. Abernathy  EBL: 10mL  Urine:  Patient voided on call to the OR  IV Fluids: 500mL crystalloid  Hysteroscopic fluid deficit: 1000mL normal saline  Specimens: endometrial curettings including polyp vs fibroid  Complications: none apparent    Findings: Exam under anesthesia revealed normal external genitalia, normal vaginal mucosa and normal cervix without lesions. Bimanual exam demonstrated 8 week size midposition mobile uterus, no adnexal masses. Hysteroscopy revealed intracavity mass on left lateral sidewall at mid-uterus.  Remainder of endometrial lining appeared thin, normal. Bilateral tubal ostia visualized.    Indications: Augustus Mcgrath is a 54 year old  with abnormal uterine bleeding and family history of endometrial cancer.  She had an endometrial biopsy in the office that didn't show any endometrial tissue, thus hysteroscopy was recommended. Risks, benefits, and alternatives to the procedure were discussed. The patient's questions were answered, understanding confirmed, and the patient signed written informed consent.    Technique: The patient was taken to the operating room where SCDs placed for VTE prophylaxis. No antibiotics given. She was placed under MAC anesthesia without difficulty. After patient was comfortable, she was placed in the dorsal lithotomy position with feet in Kulwant stirrups. An exam under anesthesia was performed with findings as noted above. The patient was prepped and draped in the usual sterile fashion.  Time was out performed to confirm patient's  identity and planned procedure.     A speculum was placed in the vagina and the cervix visualized. A single-toothed tenaculum was placed on the anterior lip of the cervix at 12 o'clock.  A paracervical block was placed at 4 and 8 o'clock with the same local anesthetic, using a total of 20mL of 1% lidocaine.  There was difficulty in getting into the cervical canal despite using os finder, so tenaculum was moved to posterior lip and the internal os was then carefully and easily dilated to 6.5mm with sequential hegar dilators. The hysteroscope was placed through the cervix into the uterine cavity with outflow turned on as to achieve hydrodilation of cervix while entering into uterine cavity. Examination of the uterine cavity demonstrated the above findings.     The morcellator was introduced into the cavity and used to remove the polyp vs fibroid.  The lesion was fairly vascular, so taking clear post-procedure pictures was impossible.  The fluid deficit gradually increased to 1000ml and the procedure was terminated, as it was felt the lesion was removed at that time.  Sharp curetting following morcellation confirmed a normal feeling endometrial cavity.      The tenaculum was removed from the cervix and sites noted to be hemostatic.   The speculum was removed from the vagina.     Instrument, needle and sponge counts were correct x2. I was present and scrubbed for the entire procedure. The patient tolerated the procedure well. The patient was awoken in the OR and transferred to the PACU in stable condition. The patient received IV toradadol for pain control. She was discharged on same day as procedure with Tylenol, ibuprofen for pain control with follow up via telephone visit in 2-4w (in person evaluation prn).    Wendy Hernandez MD

## 2020-06-15 NOTE — ANESTHESIA CARE TRANSFER NOTE
Patient: Augustus Mcgrath    Procedure(s):  Hysteroscopy with morcellator    Diagnosis: Abnormal uterine bleeding [N93.9]  Family history of malignant neoplasm of endometrium [Z80.49]  Diagnosis Additional Information: No value filed.    Anesthesia Type:   MAC     Note:  Airway :Room Air  Patient transferred to:Phase II  Handoff Report: Identifed the Patient, Identified the Reponsible Provider, Reviewed the pertinent medical history, Discussed the surgical course, Reviewed Intra-OP anesthesia mangement and issues during anesthesia, Set expectations for post-procedure period and Allowed opportunity for questions and acknowledgement of understanding      Vitals: (Last set prior to Anesthesia Care Transfer)    CRNA VITALS  6/15/2020 1506 - 6/15/2020 1540      6/15/2020             Pulse:  74    SpO2:  100 %                Electronically Signed By: ROSANGELA Haywood CRNA  Amarilis 15, 2020  3:40 PM

## 2020-06-15 NOTE — ANESTHESIA PREPROCEDURE EVALUATION
"Anesthesia Pre-Procedure Evaluation    Patient: Augustus Mcgrath   MRN:     [id number] Gender:   female   Age:    54 year old :      1965        Preoperative Diagnosis: Abnormal uterine bleeding [N93.9]  Family history of malignant neoplasm of endometrium [Z80.49]   Procedure(s):  Hysteroscopy with morcellator     LABS:  CBC:   Lab Results   Component Value Date    WBC 6.8 2020    WBC 9.5 2020    HGB 9.9 (L) 2020    HGB 12.9 2020    HCT 34.2 (L) 2020    HCT 40.7 2020     2020     2020     BMP:   Lab Results   Component Value Date     2020     2019    POTASSIUM 4.0 2020    POTASSIUM 3.6 2019    CHLORIDE 110 (H) 2020    CHLORIDE 109 2019    CO2 22 2020    CO2 27 2019    BUN 15 2020    BUN 18 2019    CR 0.69 2020    CR 0.65 2019     (H) 2020    GLC 89 2019     COAGS: No results found for: PTT, INR, FIBR  POC:   Lab Results   Component Value Date    HCG Negative 2020     OTHER:   Lab Results   Component Value Date    PHILLIP 10.1 2020    ALBUMIN 3.9 2019    PROTTOTAL 8.1 2019    ALT 34 2019    AST 18 2019    ALKPHOS 82 2019    BILITOTAL 0.3 2019        Preop Vitals    BP Readings from Last 3 Encounters:   20 130/84   20 138/88   20 (!) 146/92    Pulse Readings from Last 3 Encounters:   20 99   20 92   20 92      Resp Readings from Last 3 Encounters:   20 19   20 14   20 14    SpO2 Readings from Last 3 Encounters:   20 98%   20 99%   20 98%      Temp Readings from Last 1 Encounters:   20 36.8  C (98.2  F) (Oral)    Ht Readings from Last 1 Encounters:   20 1.549 m (5' 1\")      Wt Readings from Last 1 Encounters:   20 64.9 kg (143 lb)    Estimated body mass index is 27.02 kg/m  as calculated from the following:    " "Height as of 4/1/20: 1.549 m (5' 1\").    Weight as of 6/1/20: 64.9 kg (143 lb).     LDA:        Past Medical History:   Diagnosis Date     ASCUS of cervix with negative high risk HPV 03/01/2018    3/1/18 ASCUS, Neg HPV     Complication of anesthesia     ponv     Contraception      has vasectomy     Eye problems 1996    right eye, retinal detachment, LEFT EY PROBLEMATIC AS WELL     Hypertension 2017     Iron deficiency anemia due to chronic blood loss 11/28/2016      Past Surgical History:   Procedure Laterality Date     retinal surgery  1986    scleral buckle and several lasers both eyes     VITRECTOMY ANTERIOR  8/28/13    [facility]      Allergies   Allergen Reactions     Lisinopril         Anesthesia Evaluation    ROS/Med Hx    No history of anesthetic complications  (-) malignant hyperthermia and tuberculosis  Comments: Met with Augustus who has been NPO and has abnormal uterine bleeding and is scheduled for hysteroscopy with morcellator -   She also has migraine and hypertension  She is allergic to Lisinopril  Her Hgb and platelet count and electrolytes are satisfactory.   She has had eye surgeries and was done with a GA as well as MAC with block; she did wake up during the MAC care.   She denies smoking and says is not pregnant.     Cardiovascular Findings   (+) hypertension,   Comments: No history of CHF, arrhythmias or MI    Neuro Findings   Comments: Retinal detachment; left eye cataract and almost blind per her statements;   History of migraines    Pulmonary Findings   (-) asthma and apnea    HENT Findings - negative HENT ROS    Skin Findings - negative skin ROS      GI/Hepatic/Renal Findings   (-) GERD    Endocrine/Metabolic Findings - negative ROS      Genetic/Syndrome Findings - negative genetics/syndromes ROS    Hematology/Oncology Findings - negative hematology/oncology ROS    Additional Notes  Allergies:   -- Lisinopril   .   Current Outpatient Medications:  hydrochlorothiazide " (HYDRODIURIL) 25 MG tablet  metoclopramide (REGLAN) 10 MG tablet  norethindrone (AYGESTIN) 5 MG tablet  ondansetron (ZOFRAN-ODT) 4 MG ODT tab  SUMAtriptan (IMITREX) 50 MG tablet            PHYSICAL EXAM:   Mental Status/Neuro: A/A/O   Airway: Facies: Feasible  Mallampati: I  Mouth/Opening: Full  TM distance: > 6 cm  Neck ROM: Full   Respiratory: Auscultation: CTAB     Resp. Rate: Normal     Resp. Effort: Normal      CV: Rhythm: Regular  Rate: Age appropriate  Heart: Normal Sounds  Edema: None   Comments:      Dental: Details                  Assessment:   ASA SCORE: 2    H&P: History and physical reviewed and following examination; no interval change.   Smoking Status:  Non-Smoker/Unknown   NPO Status: NPO Appropriate     Plan:   Anes. Type:  MAC   Pre-Medication: None   Induction:  N/a   Airway: Native Airway   Access/Monitoring: PIV   Maintenance: N/a     Postop Plan:   Postop Pain: None  Postop Sedation/Airway: Not planned  Disposition: Outpatient     PONV Management:   Adult Risk Factors: Female, Non-Smoker   Prevention: Ondansetron, Dexamethasone     CONSENT: Direct conversation   Plan and risks discussed with: Patient   Blood Products: Consented (ALL Blood Products)       Comments for Plan/Consent:  Augustus requests sedation - GA if required. Procedures and risks explained. She understood and consented. Qs answered.            Remington Navarrete MD

## 2020-06-15 NOTE — DISCHARGE INSTRUCTIONS
What happens after hysteroscopy?    You may have cramps and bleeding for 24 hours after the procedure. This is normal. Use pads instead of tampons.    Do not douche or use tampons until your health care provider says it s OK.    Do not use any vaginal medicines until you are told it s OK.    Ask your health care provider when it s OK to have sex again.  When should I call my health care provider?  Call your health care provider if you have:    Heavy bleeding (more than 1 pad an hour for 2 or more hours)    A fever over 100.4 F (38.0 C)    Increasing abdominal pain or tenderness    Foul-smelling discharge  Follow-up care  Schedule a follow-up visit with your health care provider. Based on the results of your test, you may need more treatment. Be sure to follow instructions and keep your appointments.    Important numbers  [facility] ([address]) [facility]: [phone]   [facility] ([address]) : [phone]      [id number] [employer]. [address] [place] [address]. All rights reserved. This information is not intended as a substitute for professional medical care. Always follow your healthcare professional's instructions.       Same-Day Surgery   Adult Discharge Orders & Instructions     For 24 hours after surgery:  1. Get plenty of rest.  A responsible adult must stay with you for at least 24 hours after you leave the hospital.   2. Pain medication can slow your reflexes. Do not drive or use heavy equipment.  If you have weakness or tingling, don't drive or use heavy equipment until this feeling goes away.  3. Mixing alcohol and pain medication can cause dizziness and slow your breathing. It can even be fatal. Do not drink alcohol while taking pain medication.  4. Avoid strenuous or risky activities.  Ask for help when climbing stairs.   5. You may feel lightheaded.  If so, sit for a few minutes before standing.  Have someone help you get  up.   6. If you have nausea (feel sick to your stomach), drink only clear liquids such as apple juice, ginger ale, broth or 7-Up.  Rest may also help.  Be sure to drink enough fluids.  Move to a regular diet as you feel able. Take pain medications with a small amount of solid food, such as toast or crackers, to avoid nausea.   7. A slight fever is normal. Call the doctor if your fever is over 100 F (37.7 C) (taken under the tongue) or lasts longer than 24 hours.  8. You may have a dry mouth, muscle aches, trouble sleeping or a sore throat.  These symptoms should go away after 24 hours.  9. Do not make important or legal decisions.   Pain Management:      1. Take pain medication (if prescribed) for pain as directed by your physician.        2. WARNING: If the pain medication you have been prescribed contains Tylenol  (acetaminophen), DO NOT take additional doses of Tylenol (acetaminophen).     Call your doctor for any of the followin.  Signs of infection (fever, growing tenderness at the surgery site, severe pain, a large amount of drainage or bleeding, foul-smelling drainage, redness, swelling).    2.  It has been over 8 to 10 hours since surgery and you are still not able to urinate (pee).    3.  Headache for over 24 hours.    4.  Numbness, tingling or weakness the day after surgery (if you had spinal anesthesia).  To contact a doctor, call Dr. Hernandez or:      [phone] and ask for the Resident On Call for:          OB/GYN (answered 24 hours a day)      Emergency Department:  [facility]: [phone]  [facility]: [phone]               Rev. 10/2014

## 2020-06-23 LAB — COPATH REPORT: NORMAL

## 2020-09-15 ENCOUNTER — VIRTUAL VISIT (OUTPATIENT)
Dept: FAMILY MEDICINE | Facility: CLINIC | Age: 55
End: 2020-09-15
Payer: COMMERCIAL

## 2020-09-15 DIAGNOSIS — I10 HYPERTENSION, GOAL BELOW 140/90: ICD-10-CM

## 2020-09-15 DIAGNOSIS — R10.13 EPIGASTRIC PAIN: Primary | ICD-10-CM

## 2020-09-15 PROCEDURE — 99214 OFFICE O/P EST MOD 30 MIN: CPT | Mod: 95 | Performed by: FAMILY MEDICINE

## 2020-09-15 NOTE — PROGRESS NOTES
"Augustus Mcgrath is a 54 year old female who is being evaluated via a billable telephone visit.      The patient has been notified of following:     \"This telephone visit will be conducted via a call between you and your physician/provider. We have found that certain health care needs can be provided without the need for a physical exam.  This service lets us provide the care you need with a short phone conversation.  If a prescription is necessary we can send it directly to your pharmacy.  If lab work is needed we can place an order for that and you can then stop by our lab to have the test done at a later time.    Telephone visits are billed at different rates depending on your insurance coverage. During this emergency period, for some insurers they may be billed the same as an in-person visit.  Please reach out to your insurance provider with any questions.    If during the course of the call the physician/provider feels a telephone visit is not appropriate, you will not be charged for this service.\"    Patient has given verbal consent for Telephone visit?  Yes    What phone number would you like to be contacted at? [phone]    How would you like to obtain your AVS? Ksenia Gómez     Augustus Mcgrath is a 54 year old female who presents via phone visit today for the following health issues:    HPI    Abdominal Pain   Onset/Duration: off and on since april  Description:   Character: Sharp  Location: upper abdomial  Radiation: None  Intensity: moderate  Progression of Symptoms:  same  Accompanying Signs & Symptoms:  Fever/Chills: no  Gas/Bloating: no  Nausea: YES  Vomitting: YES  Diarrhea: YES,sometimes not always  Constipation: no  Dysuria or Hematuria: no  History:   Trauma: no  Previous similar pain: no  Previous tests done: none  Precipitating factors:   Does the pain change with:     Food: not sure    Bowel Movement: no    Urination: no   Other factors:  After eating breakfast and coffee, but " "when she started getting the abdominal pain it was in the night  Therapies tried and outcome: Pepcid, helps a little bit, tries to take it before she eats in the morning  No LMP recorded. (Menstrual status: Irregular Periods).      Epigastric pain - usually after eating breakfast.  Pepcid helps.  Last weekend it progressed to nausea/vomiting.    Had diarrhea after vomiting episode.  Not currently taking any NSAIDs.   Alcohol intake about 4 drinks/week.  Has 1-2 cups coffee in AM - has switched to tea in the past couple days    She saw Dr. White for this in April.  She was treated with a 2-week course of PPI and advised to start miralax. She doesn't really recall if she did the omeprazole trial and whether or not it helped.  Her symptom pattern has been episodic - these symptoms wax and wane.      Review of Systems   Constitutional, gi and gu systems are negative, except as otherwise noted.       Objective   Vitals - Patient Reported  Weight (Patient Reported): 63.5 kg (140 lb)  Height (Patient Reported): 154.9 cm (5' 1\")      Vitals:  No vitals were obtained today due to virtual visit.    GEN:  no apparent distress  PSYCH: Alert and engaged; coherent speech with normal rate and volume; able to articulate logical thoughts, no tangential thoughts, no apparent hallucinations or delusions; affect is normal  RESP: No cough, no audible wheezing, able to talk in full sentences  Remainder of exam unable to be completed due to telephone visits            Assessment & Plan     Epigastric pain  Recurrent/episodic epigastric pain for 6 months.  Ddx considered includes: dyspepsia, PUD, GERD, gastritis, H pylori, pancreatitis, GB disease, hepatic disease, gastric/pancreatic or other cancer, intestinal ischemia. Given the duration of her symptoms I am recommending lab workup including stool testing for H pylori.  I'd also like her to do a 1-month trial of PPI and I discussed rationale of trying a strong acid-reducing medicine " consistently for one month.  Discussed that if symptoms do not improve next steps may include EGD and/or abdominal ultrasound.    - CBC with platelets and differential  - Comprehensive metabolic panel (BMP + Alb, Alk Phos, ALT, AST, Total. Bili, TP)  - Lipase  - Helicobacter pylori Antigen Stool  - omeprazole (PRILOSEC) 20 MG DR capsule  Dispense: 30 capsule; Refill: 0    Hypertension, goal below 140/90  - Albumin Random Urine Quantitative with Creat Ratio       No follow-ups on file.    Sia Langston MD  [facility]    Phone call duration:  18 minutes  2:54 PM  3:12 PM

## 2020-09-21 DIAGNOSIS — E87.6 HYPOKALEMIA: Primary | ICD-10-CM

## 2020-09-21 DIAGNOSIS — R10.13 EPIGASTRIC PAIN: ICD-10-CM

## 2020-09-21 DIAGNOSIS — I10 HYPERTENSION, GOAL BELOW 140/90: ICD-10-CM

## 2020-09-21 LAB
BASOPHILS # BLD AUTO: 0 10E9/L (ref 0–0.2)
BASOPHILS NFR BLD AUTO: 0.3 %
DIFFERENTIAL METHOD BLD: ABNORMAL
EOSINOPHIL # BLD AUTO: 0.2 10E9/L (ref 0–0.7)
EOSINOPHIL NFR BLD AUTO: 2.1 %
ERYTHROCYTE [DISTWIDTH] IN BLOOD BY AUTOMATED COUNT: 19.7 % (ref 10–15)
HCT VFR BLD AUTO: 38.5 % (ref 35–47)
HGB BLD-MCNC: 11.6 G/DL (ref 11.7–15.7)
LIPASE SERPL-CCNC: 187 U/L (ref 73–393)
LYMPHOCYTES # BLD AUTO: 2.4 10E9/L (ref 0.8–5.3)
LYMPHOCYTES NFR BLD AUTO: 30.7 %
MCH RBC QN AUTO: 21.2 PG (ref 26.5–33)
MCHC RBC AUTO-ENTMCNC: 30.1 G/DL (ref 31.5–36.5)
MCV RBC AUTO: 70 FL (ref 78–100)
MONOCYTES # BLD AUTO: 0.5 10E9/L (ref 0–1.3)
MONOCYTES NFR BLD AUTO: 6.5 %
NEUTROPHILS # BLD AUTO: 4.7 10E9/L (ref 1.6–8.3)
NEUTROPHILS NFR BLD AUTO: 60.4 %
PLATELET # BLD AUTO: 262 10E9/L (ref 150–450)
RBC # BLD AUTO: 5.47 10E12/L (ref 3.8–5.2)
WBC # BLD AUTO: 7.8 10E9/L (ref 4–11)

## 2020-09-21 PROCEDURE — 36415 COLL VENOUS BLD VENIPUNCTURE: CPT | Performed by: FAMILY MEDICINE

## 2020-09-21 PROCEDURE — 83690 ASSAY OF LIPASE: CPT | Performed by: FAMILY MEDICINE

## 2020-09-21 PROCEDURE — 85025 COMPLETE CBC W/AUTO DIFF WBC: CPT | Performed by: FAMILY MEDICINE

## 2020-09-21 PROCEDURE — 80053 COMPREHEN METABOLIC PANEL: CPT | Performed by: FAMILY MEDICINE

## 2020-09-21 PROCEDURE — 82043 UR ALBUMIN QUANTITATIVE: CPT | Performed by: FAMILY MEDICINE

## 2020-09-22 DIAGNOSIS — R10.13 EPIGASTRIC PAIN: ICD-10-CM

## 2020-09-22 LAB
ALBUMIN SERPL-MCNC: 3.6 G/DL (ref 3.4–5)
ALP SERPL-CCNC: 91 U/L (ref 40–150)
ALT SERPL W P-5'-P-CCNC: 29 U/L (ref 0–50)
ANION GAP SERPL CALCULATED.3IONS-SCNC: 8 MMOL/L (ref 3–14)
AST SERPL W P-5'-P-CCNC: 18 U/L (ref 0–45)
BILIRUB SERPL-MCNC: 0.3 MG/DL (ref 0.2–1.3)
BUN SERPL-MCNC: 14 MG/DL (ref 7–30)
CALCIUM SERPL-MCNC: 10.1 MG/DL (ref 8.5–10.1)
CHLORIDE SERPL-SCNC: 104 MMOL/L (ref 94–109)
CO2 SERPL-SCNC: 26 MMOL/L (ref 20–32)
CREAT SERPL-MCNC: 0.72 MG/DL (ref 0.52–1.04)
CREAT UR-MCNC: 120 MG/DL
GFR SERPL CREATININE-BSD FRML MDRD: >90 ML/MIN/{1.73_M2}
GLUCOSE SERPL-MCNC: 91 MG/DL (ref 70–99)
MICROALBUMIN UR-MCNC: 11 MG/L
MICROALBUMIN/CREAT UR: 9.08 MG/G CR (ref 0–25)
POTASSIUM SERPL-SCNC: 3.2 MMOL/L (ref 3.4–5.3)
PROT SERPL-MCNC: 7.9 G/DL (ref 6.8–8.8)
SODIUM SERPL-SCNC: 138 MMOL/L (ref 133–144)

## 2020-09-22 PROCEDURE — 87338 HPYLORI STOOL AG IA: CPT | Performed by: FAMILY MEDICINE

## 2020-09-22 NOTE — RESULT ENCOUNTER NOTE
Arturo Coffman,  Your blood counts show that your hemoglobin is rising as would be expected after your hysteroscopy this summer.  You may want to take an oral iron supplement to help build back your iron stores.      Your lipase (pancreatic enzyme) is normal.      Your comprehensive metabolic panel (liver function, kidney function, blood sugar, and blood salts) is showing low potassium but is otherwise normal.  The low potassium is caused by the HCTZ that you are on, but it has not been low previously (even after you started the HCTZ).      I recommend that you increase potassium in your diet and we should recheck the potassium level in 1-2 weeks.  If it remains low we may need to switch you to triamterene/HCTZ (which has less of a potassium-lowering effect). Potassium-rich foods include potatoes, bananas, watermelon (any melon), and dried fruit.  One medium banana per day would be a good idea.    Finally, your urine albumin (protein) level is normal.  Urine albumin is a test for microscopic proteins in the urine - a sign of early kidney disease from high blood pressure. Keeping blood pressure controlled helps keep the kidneys healthy.      I see that your H pylori test is in process and will be in touch with those results.      PLEASE increase the potassium in your diet and schedule a lab-only appointment for repeat blood draw in 1-2 weeks to recheck the potassium; 3.2 is too low.  Sia Langston MD

## 2020-09-23 LAB — H PYLORI AG STL QL IA: NEGATIVE

## 2020-09-23 NOTE — RESULT ENCOUNTER NOTE
Arturo Coffman,  Your H pylori test was negative.  Let's see how you do on the daily omeprazole. If you are still having symptoms after a month of that please schedule a follow-up visit with me.    Sia Langston MD

## 2020-10-20 DIAGNOSIS — I10 HYPERTENSION, GOAL BELOW 140/90: Primary | ICD-10-CM

## 2020-10-20 DIAGNOSIS — E87.6 HYPOKALEMIA: ICD-10-CM

## 2020-10-20 PROCEDURE — 36415 COLL VENOUS BLD VENIPUNCTURE: CPT | Performed by: FAMILY MEDICINE

## 2020-10-20 PROCEDURE — 84132 ASSAY OF SERUM POTASSIUM: CPT | Performed by: FAMILY MEDICINE

## 2020-10-21 LAB — POTASSIUM SERPL-SCNC: 3.3 MMOL/L (ref 3.4–5.3)

## 2020-10-21 RX ORDER — TRIAMTERENE AND HYDROCHLOROTHIAZIDE 37.5; 25 MG/1; MG/1
1 CAPSULE ORAL EVERY MORNING
Qty: 90 CAPSULE | Refills: 0 | Status: SHIPPED | OUTPATIENT
Start: 2020-10-21 | End: 2021-01-25

## 2020-10-21 NOTE — RESULT ENCOUNTER NOTE
Arturo Coffman,  I don't know why but your potassium remains low.  I think we should switch your HCTZ (hydrochlorothiazide) to triamterene/HCTZ, a combo pill.  The triamterene shouldn't affect your blood pressure much but will help blunt the potassium-lowering effects of the HCTZ.  I sent the prescription for that to your pharmacy.  Once you start it be sure to stop taking the plain HCTZ.  And then we should recheck your labs 2-4 weeks after you make the change (hopefully for the last time).  Please schedule another lab-only appointment for that - at any [facility].  Sia Langston MD

## 2020-11-08 ENCOUNTER — HEALTH MAINTENANCE LETTER (OUTPATIENT)
Age: 55
End: 2020-11-08

## 2020-11-30 NOTE — PATIENT INSTRUCTIONS
Take the daily Prilosec for another couple of weeks.  If you are still feeling good at that point you can step down to daily Pepcid 40 mg every morning.  Take the Pepcid daily and see if you get any further episodes while on it.  (Let me know if you do.)      Did you know?   I do telehealth (video) visits exclusively on Tuesdays and Wednesdays.  I still do in-person visits at [facility] on Mondays and Thursdays.  You can schedule a video visit for follow-up appointments as well as future appointments for certain conditions.  Please see the below link.  [email or web address]    If you have not already done so,  I encourage you to sign up for [email or web address] ([email or web address]  This will allow you to review your results, securely communicate with a provider, and schedule virtual visits as well.    If you are due for a mammogram or colonoscopy please call the [facility] scheduling number: [phone].

## 2020-12-02 ENCOUNTER — VIRTUAL VISIT (OUTPATIENT)
Dept: FAMILY MEDICINE | Facility: CLINIC | Age: 55
End: 2020-12-02
Payer: COMMERCIAL

## 2020-12-02 DIAGNOSIS — R10.13 EPIGASTRIC PAIN: ICD-10-CM

## 2020-12-02 PROCEDURE — 99214 OFFICE O/P EST MOD 30 MIN: CPT | Mod: 95 | Performed by: FAMILY MEDICINE

## 2020-12-02 RX ORDER — FAMOTIDINE 40 MG/1
40 TABLET, FILM COATED ORAL EVERY MORNING
Qty: 90 TABLET | Refills: 3 | Status: SHIPPED | OUTPATIENT
Start: 2020-12-02 | End: 2023-11-03

## 2020-12-02 NOTE — PROGRESS NOTES
"Augustus Mcgrath is a 55 year old female who is being evaluated via a billable video visit.      The patient has been notified of following:     \"This video visit will be conducted via a call between you and your physician/provider. We have found that certain health care needs can be provided without the need for an in-person physical exam.  This service lets us provide the care you need with a video conversation.  If a prescription is necessary we can send it directly to your pharmacy.  If lab work is needed we can place an order for that and you can then stop by our lab to have the test done at a later time.    Video visits are billed at different rates depending on your insurance coverage.  Please reach out to your insurance provider with any questions.    If during the course of the call the physician/provider feels a video visit is not appropriate, you will not be charged for this service.\"    Patient has given verbal consent for Video visit? Yes  How would you like to obtain your AVS? MyChart  If you are dropped from the video visit, the video invite should be resent to: Send to e-mail at: [email or web address]  Will anyone else be joining your video visit? No    Subjective     Augustus Mcgrath is a 55 year old female who presents today via video visit for the following health issues:    HPI     Abdominal/Flank Pain  Onset/Duration: x 1 week  Description:   Character: Cramping  Location: epigastric region  Radiation: None  Intensity: mild  Progression of Symptoms:  improving  Accompanying Signs & Symptoms:  Fever/Chills: no  Gas/Bloating: no  Nausea: YES  Vomitting: no  Diarrhea: no  Constipation: no  Dysuria or Hematuria: no  History:   Trauma: no  Previous similar pain: YES  Previous tests done: none  Precipitating factors:   Does the pain change with:     Food: YES- mostly in morning after breakfast    Bowel Movement: no    Urination: no   Other factors:  no  Therapies tried and outcome: Prilosec which " helps  No LMP recorded. (Menstrual status: UNKNOWN).    Recurrent episodes of epigastric pain associated with nausea/vomiting.      First episode occured in March/April - had visit with Dr. White.  She was advised to try a 2-week course of PPI but doesn't recall if she did.  Symptoms subsided.      Second episode occured in September and she did take omeprazole x 4 weeks with good response.  Lab workup at that time was negative including negative H pylori and normal CBC, CMP, lipase.    Had another recurrence recently, about 10 days ago.    Ate breakfast and went to work and then developed nausea, vomiting and epigastric pain.    She started back on prilosec OTC.    Some heartburn.    No dysphagia.      Video Start Time: 9:10 AM        Review of Systems   Constitutional, gi systems are negative, except as otherwise noted.      Objective           Vitals:  No vitals were obtained today due to virtual visit.    Physical Exam     GENERAL: Healthy, alert and no distress  EYES: Eyes grossly normal to inspection.  No discharge or erythema, or obvious scleral/conjunctival abnormalities.  RESP: No audible wheeze, cough, or visible cyanosis.  No visible retractions or increased work of breathing.    SKIN: Visible skin clear. No significant rash, abnormal pigmentation or lesions.  NEURO: Cranial nerves grossly intact.  Mentation and speech appropriate for age.  PSYCH: Mentation appears normal, affect normal/bright, judgement and insight intact, normal speech and appearance well-groomed.              Assessment & Plan     Epigastric pain  Recurrent.  We've been working with a presumptive diagnosis of acute gastritis and the quick response to OTC PPI supports that.  However, we did discuss that if episodes persist we should consider RUQ US to assess GB and/or EGD.  She has a strong FHX for cholelithiasis but her symptom pattern is not typical for gallstones.  For this episode I'd like her to take daily PPI x 4 weeks.  Then, if  symptoms are still well controlled, I would like her to step down therapy to H2 blocker.  I sent prescription for Pepcid 40 mg QD.  Discussed that H2 blocker is safer long-term medication for peptic acid disorders.  If she has breakthrough symptoms on the daily H2 blocker she will let me know as I would likely recommend further assessment at that time.  - famotidine (PEPCID) 40 MG tablet  Dispense: 90 tablet; Refill: 3         Patient Instructions   Take the daily Prilosec for another couple of weeks.  If you are still feeling good at that point you can step down to daily Pepcid 40 mg every morning.  Take the Pepcid daily and see if you get any further episodes while on it.  (Let me know if you do.)      Did you know?   I do telehealth (video) visits exclusively on Tuesdays and Wednesdays.  I still do in-person visits at [facility] on Mondays and Thursdays.  You can schedule a video visit for follow-up appointments as well as future appointments for certain conditions.  Please see the below link.  [email or web address]    If you have not already done so,  I encourage you to sign up for [employer] [email or web address]).  This will allow you to review your results, securely communicate with a provider, and schedule virtual visits as well.    If you are due for a mammogram or colonoscopy please call the [facility] scheduling number: [phone].           No follow-ups on file.    Sia Langston MD  [facility]      Video-Visit Details    Type of service:  Video Visit    Video End Time:9:24 AM    Originating Location (pt. Location): Home    Distant Location (provider location):  [facility]     Platform used for Video Visit: [employer]

## 2021-01-25 ENCOUNTER — MYC REFILL (OUTPATIENT)
Dept: FAMILY MEDICINE | Facility: CLINIC | Age: 56
End: 2021-01-25

## 2021-01-25 DIAGNOSIS — I10 HYPERTENSION, GOAL BELOW 140/90: ICD-10-CM

## 2021-01-25 DIAGNOSIS — Z12.31 VISIT FOR SCREENING MAMMOGRAM: ICD-10-CM

## 2021-01-25 DIAGNOSIS — E87.6 HYPOKALEMIA: ICD-10-CM

## 2021-01-25 PROCEDURE — 77067 SCR MAMMO BI INCL CAD: CPT | Mod: TC | Performed by: RADIOLOGY

## 2021-01-25 PROCEDURE — 77063 BREAST TOMOSYNTHESIS BI: CPT | Mod: TC | Performed by: RADIOLOGY

## 2021-01-27 NOTE — TELEPHONE ENCOUNTER
Routing refill request to provider for review/approval because:  BPs high    Patient calling about refill - has been a week   Initial refill sent by patient on 1/19/2021    Has 5-6 pills left   Please advise on refill    Thanks,  Yoana MANZO RN

## 2021-01-28 RX ORDER — TRIAMTERENE AND HYDROCHLOROTHIAZIDE 37.5; 25 MG/1; MG/1
1 CAPSULE ORAL EVERY MORNING
Qty: 30 CAPSULE | Refills: 0 | Status: SHIPPED | OUTPATIENT
Start: 2021-01-28 | End: 2021-02-05

## 2021-01-28 NOTE — TELEPHONE ENCOUNTER
Approved 1 month of meds and asked her to scheduled a lab only visit seeing she os over due for labs.    Ani Chase RN

## 2021-01-29 ENCOUNTER — OFFICE VISIT (OUTPATIENT)
Dept: FAMILY MEDICINE | Facility: CLINIC | Age: 56
End: 2021-01-29
Payer: COMMERCIAL

## 2021-01-29 VITALS
SYSTOLIC BLOOD PRESSURE: 139 MMHG | HEART RATE: 78 BPM | TEMPERATURE: 98.5 F | OXYGEN SATURATION: 97 % | DIASTOLIC BLOOD PRESSURE: 84 MMHG

## 2021-01-29 DIAGNOSIS — H61.21 CERUMINOSIS, RIGHT: Primary | ICD-10-CM

## 2021-01-29 PROCEDURE — 69209 REMOVE IMPACTED EAR WAX UNI: CPT | Mod: RT | Performed by: FAMILY MEDICINE

## 2021-01-29 NOTE — PROGRESS NOTES
Assessment & Plan     Ceruminosis, right    - VA REMOVAL IMPACTED CERUMEN IRRIGATION/LVG UNILAT    Resolved s/p ear lavage RIGHT ear.    Megan Parekh MD  [facility]    Rico Coffman is a 55 year old who presents to clinic today for the following health issues     HPI     RIGHT ear clogged- desires ear lavage.        Review of Systems   Constitutional, HEENT, cardiovascular, pulmonary, GI, , musculoskeletal, neuro, skin, endocrine and psych systems are negative, except as otherwise noted.      Objective    /84 (BP Location: Left arm, Patient Position: Sitting, Cuff Size: Adult Regular)   Pulse 78   Temp 98.5  F (36.9  C) (Oral)   SpO2 97%   There is no height or weight on file to calculate BMI.  Physical Exam   GENERAL: healthy, alert and no distress  EYES: Eyes grossly normal to inspection, PERRL and conjunctivae and sclerae normal  HENT: RIGHT ear canal clogged with cerumen- unable to remove with curette, LEFT canal and TM normal, nose and mouth without ulcers or lesions  NECK: no adenopathy, no asymmetry, masses, or scars and thyroid normal to palpation  MS: no gross musculoskeletal defects noted, no edema  SKIN: no suspicious lesions or rashes  PSYCH: mentation appears normal, affect normal/bright

## 2021-01-29 NOTE — NURSING NOTE
Patient identified using two patient identifiers.  Ear exam showing wax occlusion completed by provider.  Solution: warm water was placed in the right ear(s) via irrigation tool: elephant ear.    Susy Ramirez MA

## 2021-02-04 DIAGNOSIS — E87.6 HYPOKALEMIA: ICD-10-CM

## 2021-02-04 DIAGNOSIS — I10 HYPERTENSION, GOAL BELOW 140/90: ICD-10-CM

## 2021-02-04 LAB
ANION GAP SERPL CALCULATED.3IONS-SCNC: 5 MMOL/L (ref 3–14)
BUN SERPL-MCNC: 14 MG/DL (ref 7–30)
CALCIUM SERPL-MCNC: 11.2 MG/DL (ref 8.5–10.1)
CHLORIDE SERPL-SCNC: 106 MMOL/L (ref 94–109)
CO2 SERPL-SCNC: 28 MMOL/L (ref 20–32)
CREAT SERPL-MCNC: 0.7 MG/DL (ref 0.52–1.04)
GFR SERPL CREATININE-BSD FRML MDRD: >90 ML/MIN/{1.73_M2}
GLUCOSE SERPL-MCNC: 88 MG/DL (ref 70–99)
POTASSIUM SERPL-SCNC: 3.4 MMOL/L (ref 3.4–5.3)
SODIUM SERPL-SCNC: 139 MMOL/L (ref 133–144)

## 2021-02-04 PROCEDURE — 80048 BASIC METABOLIC PNL TOTAL CA: CPT | Performed by: FAMILY MEDICINE

## 2021-02-04 PROCEDURE — 36415 COLL VENOUS BLD VENIPUNCTURE: CPT | Performed by: FAMILY MEDICINE

## 2021-02-05 RX ORDER — TRIAMTERENE AND HYDROCHLOROTHIAZIDE 37.5; 25 MG/1; MG/1
1 CAPSULE ORAL EVERY MORNING
Qty: 90 CAPSULE | Refills: 3 | Status: SHIPPED | OUTPATIENT
Start: 2021-02-05 | End: 2022-04-13

## 2021-02-05 NOTE — RESULT ENCOUNTER NOTE
Arturo Coffman,  Thanks for coming in for the lab-only appointment.  Your potassium is a bit better on the triamterene/HCTZ so please continue with that.  I will send in approvals for additional refills.    Sia Langston MD

## 2021-03-12 DIAGNOSIS — Z12.11 SCREEN FOR COLON CANCER: Primary | ICD-10-CM

## 2021-03-31 LAB — COLOGUARD-ABSTRACT: NEGATIVE

## 2021-09-12 ENCOUNTER — HEALTH MAINTENANCE LETTER (OUTPATIENT)
Age: 56
End: 2021-09-12

## 2022-01-02 ENCOUNTER — HEALTH MAINTENANCE LETTER (OUTPATIENT)
Age: 57
End: 2022-01-02

## 2022-03-18 ENCOUNTER — ANCILLARY PROCEDURE (OUTPATIENT)
Dept: MAMMOGRAPHY | Facility: CLINIC | Age: 57
End: 2022-03-18
Attending: FAMILY MEDICINE
Payer: COMMERCIAL

## 2022-03-18 DIAGNOSIS — Z12.31 VISIT FOR SCREENING MAMMOGRAM: ICD-10-CM

## 2022-03-18 PROCEDURE — 77067 SCR MAMMO BI INCL CAD: CPT | Mod: TC | Performed by: RADIOLOGY

## 2022-03-18 PROCEDURE — 77063 BREAST TOMOSYNTHESIS BI: CPT | Mod: TC | Performed by: RADIOLOGY

## 2022-04-12 ENCOUNTER — MYC MEDICAL ADVICE (OUTPATIENT)
Dept: FAMILY MEDICINE | Facility: CLINIC | Age: 57
End: 2022-04-12
Payer: COMMERCIAL

## 2022-04-12 DIAGNOSIS — I10 HYPERTENSION, GOAL BELOW 140/90: ICD-10-CM

## 2022-04-12 DIAGNOSIS — E87.6 HYPOKALEMIA: ICD-10-CM

## 2022-04-13 RX ORDER — TRIAMTERENE AND HYDROCHLOROTHIAZIDE 37.5; 25 MG/1; MG/1
1 CAPSULE ORAL EVERY MORNING
Qty: 90 CAPSULE | Refills: 0 | Status: SHIPPED | OUTPATIENT
Start: 2022-04-13 | End: 2022-05-26 | Stop reason: SINTOL

## 2022-04-18 NOTE — PROGRESS NOTES
SUBJECTIVE:  Augustus Mcgrath is a 51 year old female who presents to the clinic today for a rash.  Onset of rash was 3 day(s) ago.   Rash is still present.  Location of the rash: around mouth.  Quality/symptoms of rash: irritation and fine little white bumps   Symptoms are mild to moderate and rash seems to be worsening.  Previous history of a similar rash? yes  Recent exposure history: none  Recent new medications: none  Associated symptoms include: little bumps around mouth.    Past Medical History:   Diagnosis Date     Contraception      has vasectomy     Eye problems 1996    right eye, retinal detachment, LEFT EY PROBLEMATIC AS WELL     Iron deficiency anemia due to chronic blood loss 11/28/2016        Allergies   Allergen Reactions     Nkda [No Known Drug Allergies]      Social History   Substance Use Topics     Smoking status: Former Smoker     Packs/day: 0.50     Years: 17.00     Types: Cigarettes     Quit date: 11/10/2010     Smokeless tobacco: Never Used      Comment: occ     Alcohol use Yes      Comment: 5 drinks a week        ROS:  CONSTITUTIONAL:NEGATIVE for fever, chills, change in weight  INTEGUMENTARY/SKIN: POSITIVE for rash around mouth and chin  ENT/MOUTH: Positive for localized rash  RESP:NEGATIVE for significant cough or SOB  CV: NEGATIVE for chest pain, palpitations or peripheral edema  NEURO: NEGATIVE for weakness, dizziness or paresthesias    EXAM:   BP (!) 158/100 (BP Location: Right arm, Patient Position: Chair, Cuff Size: Adult Regular)  Pulse 63  Temp 98.5  F (36.9  C) (Oral)  Wt 154 lb 3.2 oz (69.9 kg)  SpO2 99%  BMI 29.28 kg/m2  GENERAL: alert, no acute distress.  SKIN: Rash description:    Distribution: localized  Location: chin area    Color: red,  Lesion type: macular, isolated with inflammation  GENERAL APPEARANCE: healthy, alert and no distress  EYES: EOMI,  PERRL, conjunctiva clear  HENT: ear canals and TM's normal.  Nose and mouth without ulcers, erythema or  lesions  NECK: supple, non-tender to palpation, no adenopathy noted  RESP: lungs clear to auscultation - no rales, rhonchi or wheezes  CV: regular rates and rhythm, normal S1 S2, no murmur noted    ASSESSMENT/PLAN:      ICD-10-CM    1. Perioral dermatitis L71.0 doxycycline (VIBRAMYCIN) 100 MG capsule   2. Impetigo L01.00 mupirocin (BACTROBAN) 2 % ointment     1) See today's orders.  2) Follow-up with primary clinic if not improving     ABDOMINAL PAIN/NAUSEA/VOMITING

## 2022-05-26 ENCOUNTER — OFFICE VISIT (OUTPATIENT)
Dept: FAMILY MEDICINE | Facility: CLINIC | Age: 57
End: 2022-05-26
Payer: COMMERCIAL

## 2022-05-26 VITALS
BODY MASS INDEX: 24.12 KG/M2 | SYSTOLIC BLOOD PRESSURE: 110 MMHG | HEIGHT: 61 IN | TEMPERATURE: 97.4 F | OXYGEN SATURATION: 99 % | DIASTOLIC BLOOD PRESSURE: 79 MMHG | HEART RATE: 71 BPM | WEIGHT: 127.75 LBS

## 2022-05-26 DIAGNOSIS — N95.2 POSTMENOPAUSE ATROPHIC VAGINITIS: ICD-10-CM

## 2022-05-26 DIAGNOSIS — E87.6 HYPOKALEMIA: ICD-10-CM

## 2022-05-26 DIAGNOSIS — I10 HYPERTENSION, GOAL BELOW 140/90: ICD-10-CM

## 2022-05-26 DIAGNOSIS — L65.9 HAIR LOSS: ICD-10-CM

## 2022-05-26 DIAGNOSIS — D50.0 IRON DEFICIENCY ANEMIA DUE TO CHRONIC BLOOD LOSS: ICD-10-CM

## 2022-05-26 DIAGNOSIS — Z13.220 SCREENING FOR HYPERLIPIDEMIA: ICD-10-CM

## 2022-05-26 DIAGNOSIS — Z12.4 CERVICAL CANCER SCREENING: ICD-10-CM

## 2022-05-26 DIAGNOSIS — Z00.00 ROUTINE GENERAL MEDICAL EXAMINATION AT A HEALTH CARE FACILITY: Primary | ICD-10-CM

## 2022-05-26 LAB
CREAT UR-MCNC: 28 MG/DL
ERYTHROCYTE [DISTWIDTH] IN BLOOD BY AUTOMATED COUNT: 12.8 % (ref 10–15)
HCT VFR BLD AUTO: 48.9 % (ref 35–47)
HGB BLD-MCNC: 15.9 G/DL (ref 11.7–15.7)
MCH RBC QN AUTO: 29.6 PG (ref 26.5–33)
MCHC RBC AUTO-ENTMCNC: 32.5 G/DL (ref 31.5–36.5)
MCV RBC AUTO: 91 FL (ref 78–100)
MICROALBUMIN UR-MCNC: <5 MG/L
MICROALBUMIN/CREAT UR: NORMAL MG/G{CREAT}
PLATELET # BLD AUTO: 196 10E3/UL (ref 150–450)
RBC # BLD AUTO: 5.38 10E6/UL (ref 3.8–5.2)
WBC # BLD AUTO: 5.2 10E3/UL (ref 4–11)

## 2022-05-26 PROCEDURE — 99396 PREV VISIT EST AGE 40-64: CPT | Performed by: FAMILY MEDICINE

## 2022-05-26 PROCEDURE — 36415 COLL VENOUS BLD VENIPUNCTURE: CPT | Performed by: FAMILY MEDICINE

## 2022-05-26 PROCEDURE — 80061 LIPID PANEL: CPT | Performed by: FAMILY MEDICINE

## 2022-05-26 PROCEDURE — 82043 UR ALBUMIN QUANTITATIVE: CPT | Performed by: FAMILY MEDICINE

## 2022-05-26 PROCEDURE — 84443 ASSAY THYROID STIM HORMONE: CPT | Performed by: FAMILY MEDICINE

## 2022-05-26 PROCEDURE — 99213 OFFICE O/P EST LOW 20 MIN: CPT | Mod: 25 | Performed by: FAMILY MEDICINE

## 2022-05-26 PROCEDURE — 85027 COMPLETE CBC AUTOMATED: CPT | Performed by: FAMILY MEDICINE

## 2022-05-26 PROCEDURE — 87624 HPV HI-RISK TYP POOLED RSLT: CPT | Performed by: FAMILY MEDICINE

## 2022-05-26 PROCEDURE — 80048 BASIC METABOLIC PNL TOTAL CA: CPT | Performed by: FAMILY MEDICINE

## 2022-05-26 PROCEDURE — G0145 SCR C/V CYTO,THINLAYER,RESCR: HCPCS | Performed by: FAMILY MEDICINE

## 2022-05-26 RX ORDER — TRIAMTERENE AND HYDROCHLOROTHIAZIDE 37.5; 25 MG/1; MG/1
1 CAPSULE ORAL EVERY MORNING
Qty: 90 CAPSULE | Refills: 0 | Status: CANCELLED | OUTPATIENT
Start: 2022-05-26

## 2022-05-26 RX ORDER — LISINOPRIL 20 MG/1
20 TABLET ORAL DAILY
Qty: 90 TABLET | Refills: 1 | Status: SHIPPED | OUTPATIENT
Start: 2022-05-26 | End: 2022-06-02 | Stop reason: SINTOL

## 2022-05-26 ASSESSMENT — ENCOUNTER SYMPTOMS
SHORTNESS OF BREATH: 0
FEVER: 0
ARTHRALGIAS: 0
HEMATOCHEZIA: 0
JOINT SWELLING: 0
DYSURIA: 0
CHILLS: 0
DIZZINESS: 0
HEADACHES: 0
EYE PAIN: 0
HEMATURIA: 0
MYALGIAS: 0
COUGH: 0
CONSTIPATION: 0
HEARTBURN: 0
NERVOUS/ANXIOUS: 0
DIARRHEA: 0
PALPITATIONS: 0
NAUSEA: 0
SORE THROAT: 0
FREQUENCY: 0
BREAST MASS: 0
PARESTHESIAS: 0
WEAKNESS: 0
ABDOMINAL PAIN: 0

## 2022-05-26 NOTE — RESULT ENCOUNTER NOTE
Hello!  Thank you for getting labs done. Your cbc, or complete blood count, which measures red blood cells (to check for anemia and other vitamin deficiencies) and white blood cells (to check for infection and leukemia) is normal, which is great!  You do not have anemia or an infection.    Your platelets, which reflect liver function and ability to clot, are normal as well.     If you have any questions, please contact the clinic or schedule an appointment with me, thank you!    Sincerely,    REJI TOLEDO MD   5/26/2022

## 2022-05-26 NOTE — PROGRESS NOTES
"   SUBJECTIVE:   CC: Augustus Mcgrath is an 56 year old woman who presents for preventive health visit.     Patient has been advised of split billing requirements and indicates understanding: Yes  Healthy Habits:     Getting at least 3 servings of Calcium per day:  Yes    Bi-annual eye exam:  Yes    Dental care twice a year:  Yes    Sleep apnea or symptoms of sleep apnea:  None    Diet:  Other    Frequency of exercise:  4-5 days/week    Duration of exercise:  15-30 minutes    Taking medications regularly:  Yes    Medication side effects:  Not applicable    PHQ-2 Total Score: 0    Additional concerns today:  No    Hypertension Follow-up      Do you check your blood pressure regularly outside of the clinic? Yes     Are you following a low salt diet? Yes    Are your blood pressures ever more than 140 on the top number (systolic) OR more   than 90 on the bottom number (diastolic), for example 140/90? No    Hair Loss  She's had several episodes of increased hair loss with some increased stress. Both her mother and older sister have had thinning hair as well.    Wt Readings from Last 4 Encounters:   05/26/22 57.9 kg (127 lb 12 oz)   06/15/20 67.1 kg (147 lb 14.9 oz)   06/01/20 64.9 kg (143 lb)   04/01/20 70.8 kg (156 lb)   Estimated body mass index is 24.34 kg/m  as calculated from the following:    Height as of this encounter: 1.543 m (5' 0.75\").    Weight as of this encounter: 57.9 kg (127 lb 12 oz).     Today's PHQ-2 Score:   PHQ-2 ( 1999 Pfizer) 5/26/2022   Q1: Little interest or pleasure in doing things 0   Q2: Feeling down, depressed or hopeless 0   PHQ-2 Score 0   PHQ-2 Total Score (12-17 Years)- Positive if 3 or more points; Administer PHQ-A if positive -   Q1: Little interest or pleasure in doing things Not at all   Q2: Feeling down, depressed or hopeless Not at all   PHQ-2 Score 0       Abuse: Current or Past (Physical, Sexual or Emotional) - No  Do you feel safe in your environment? Yes    Have you ever done " Advance Care Planning? (For example, a Health Directive, POLST, or a discussion with a medical provider or your loved ones about your wishes): Yes, patient states has an Advance Care Planning document and will bring a copy to the clinic.    Social History     Tobacco Use     Smoking status: Former Smoker     Packs/day: 0.50     Years: 17.00     Pack years: 8.50     Types: Cigarettes     Quit date: 11/10/2010     Years since quittin.5     Smokeless tobacco: Never Used     Tobacco comment: occ   Substance Use Topics     Alcohol use: Yes     Comment: 2 drinks a week          Alcohol Use 2022   Prescreen: >3 drinks/day or >7 drinks/week? No   Prescreen: >3 drinks/day or >7 drinks/week? -       Reviewed orders with patient.  Reviewed health maintenance and updated orders accordingly - Yes  BP Readings from Last 3 Encounters:   22 110/79   21 139/84   06/15/20 (!) 160/87    Wt Readings from Last 3 Encounters:   22 57.9 kg (127 lb 12 oz)   06/15/20 67.1 kg (147 lb 14.9 oz)   20 64.9 kg (143 lb)                  Patient Active Problem List   Diagnosis     Menorrhagia     Enthesopathy     Need for hepatitis C screening test     Iron deficiency anemia due to chronic blood loss     Hypertension, goal below 140/90     ASCUS of cervix with negative high risk HPV     Migraine with aura     Abnormal uterine bleeding     Family history of malignant neoplasm of endometrium     Past Surgical History:   Procedure Laterality Date     OPERATIVE HYSTEROSCOPY WITH MORCELLATOR N/A 6/15/2020    Procedure: Hysteroscopy with morcellator;  Surgeon: Wendy Hernandez MD;  Location: [facility] OR     retinal surgery      scleral buckle and several lasers both eyes     VITRECTOMY ANTERIOR  13    -[facility]       Social History     Tobacco Use     Smoking status: Former Smoker     Packs/day: 0.50     Years: 17.00     Pack years: 8.50     Types: Cigarettes     Quit date: 11/10/2010     Years since  quittin.5     Smokeless tobacco: Never Used     Tobacco comment: occ   Substance Use Topics     Alcohol use: Yes     Comment: 2 drinks a week      Family History   Problem Relation Age of Onset     Hypertension Mother      Heart Disease Mother 85     Heart Failure Mother          of renal failure related to cardiac testing     Lipids Father      Heart Disease Father 85         suddenly while doing yardwork     Hyperlipidemia Father      Cerebrovascular Disease Maternal Grandfather      Cerebrovascular Disease Paternal Grandfather      Thyroid Disease Sister         hypo     Breast Cancer Sister 52     Hypertension Sister 52     Thyroid Disease Sister      Uterine Cancer Sister          Current Outpatient Medications   Medication Sig Dispense Refill     [START ON 2022] conjugated estrogens (PREMARIN) 0.625 MG/GM vaginal cream Place 1 g vaginally three times a week 30 g 3     famotidine (PEPCID) 40 MG tablet Take 1 tablet (40 mg) by mouth every morning (Patient taking differently: Take 40 mg by mouth every morning OTC) 90 tablet 3     lisinopril (ZESTRIL) 20 MG tablet Take 1 tablet (20 mg) by mouth daily 90 tablet 1     Multiple Vitamins-Minerals (PRESERVISION AREDS PO) Preservision       ondansetron (ZOFRAN-ODT) 4 MG ODT tab Take 1 tablet (4 mg) by mouth every 8 hours as needed for nausea 12 tablet 1     SUMAtriptan (IMITREX) 50 MG tablet Take 1 tablet (50 mg) by mouth at onset of headache for migraine May repeat every  2 hours until symptoms resolved. Max 4 tablets/24 hours. 30 tablet 1     metoclopramide (REGLAN) 10 MG tablet Take 1 tablet (10 mg) by mouth 4 times daily as needed (nausea) (Patient not taking: Reported on 2022) 30 tablet 3     No Known Allergies  Recent Labs   Lab Test 21  0928 10/20/20  1445 20  1517 20  1419 19  0944 18  0920 16  0854   LDL  --   --   --   --   --   --  110*   HDL  --   --   --   --   --   --  48*   TRIG  --   --   --   --    --   --  148   ALT  --   --  29  --  34  --   --    CR 0.70  --  0.72   < > 0.65   < > 0.74   GFRESTIMATED >90  --  >90   < > >90   < > 84   GFRESTBLACK >90  --  >90   < > >90   < > >90  African American GFR Calc     POTASSIUM 3.4 3.3* 3.2*   < > 3.6   < > 4.5    < > = values in this interval not displayed.        Breast Cancer Screening:    FHS-7:   Breast CA Risk Assessment (FHS-7) 3/18/2022 5/26/2022   Did any of your first-degree relatives have breast or ovarian cancer? Yes Yes   Did any of your relatives have bilateral breast cancer? No No   Did any man in your family have breast cancer? No No   Did any woman in your family have breast and ovarian cancer? Yes Yes   Did any woman in your family have breast cancer before age 50 y? No No   Do you have 2 or more relatives with breast and/or ovarian cancer? Yes Yes   Do you have 2 or more relatives with breast and/or bowel cancer? No No       Mammogram Screening: Recommended annual mammography  Pertinent mammograms are reviewed under the imaging tab.    History of abnormal Pap smear:   3/1/18 ASCUS, Neg HPV  ENDOMETRIUM CURETTAGE:   - Fragments of leiomyoma(s) with hyalinization and degenerative changes   - Inactive endometrium with features, suggestive of benign endometrial   polyp   - Negative for hyperplasia or malignancy   If normal will repeat  PAP every 5 years with  HPV co-testing   S/p hysteroscopy 6/16/2020  PAP / HPV Latest Ref Rng & Units 3/1/2018 7/10/2013 11/16/2009   PAP (Historical) - ASC-US(A) NIL ASC-US(A)   HPV16 NEG:Negative Negative - -   HPV18 NEG:Negative Negative - -   HRHPV NEG:Negative Negative - -     Reviewed and updated as needed this visit by clinical staff   Tobacco  Allergies  Meds     Fam Hx            Reviewed and updated as needed this visit by Provider                   Past Medical History:   Diagnosis Date     ASCUS of cervix with negative high risk HPV 03/01/2018    3/1/18 ASCUS, Neg HPV     Complication of anesthesia      "ponv     Contraception      has vasectomy     Eye problems     right eye, retinal detachment, LEFT EY PROBLEMATIC AS WELL     Hypertension 2017     Iron deficiency anemia due to chronic blood loss 2016      Past Surgical History:   Procedure Laterality Date     OPERATIVE HYSTEROSCOPY WITH MORCELLATOR N/A 6/15/2020    Procedure: Hysteroscopy with morcellator;  Surgeon: Wendy Hernandez MD;  Location: [facility] OR     retinal surgery      scleral buckle and several lasers both eyes     VITRECTOMY ANTERIOR  13    -[facility]     OB History    Para Term  AB Living   1 0 0 0 1 0   SAB IAB Ectopic Multiple Live Births   0 1 0 0 0      # Outcome Date GA Lbr Tono/2nd Weight Sex Delivery Anes PTL Lv   1 IAB                Review of Systems   Constitutional: Negative for chills and fever.   HENT: Negative for congestion, ear pain, hearing loss and sore throat.    Eyes: Negative for pain and visual disturbance.   Respiratory: Negative for cough and shortness of breath.    Cardiovascular: Negative for chest pain, palpitations and peripheral edema.   Gastrointestinal: Negative for abdominal pain, constipation, diarrhea, heartburn, hematochezia and nausea.   Breasts:  Negative for tenderness, breast mass and discharge.   Genitourinary: Negative for dysuria, frequency, genital sores, hematuria, pelvic pain, urgency, vaginal bleeding and vaginal discharge.   Musculoskeletal: Negative for arthralgias, joint swelling and myalgias.   Skin: Negative for rash.   Neurological: Negative for dizziness, weakness, headaches and paresthesias.   Psychiatric/Behavioral: Negative for mood changes. The patient is not nervous/anxious.         OBJECTIVE:   /79   Pulse 71   Temp 97.4  F (36.3  C) (Temporal)   Ht 1.543 m (5' 0.75\")   Wt 57.9 kg (127 lb 12 oz)   SpO2 99%   BMI 24.34 kg/m    Physical Exam  GENERAL: healthy, alert and no distress  EYES: Eyes grossly normal to inspection, PERRL " and conjunctivae and sclerae normal  HENT: ear canals and TM's normal, nose and mouth without ulcers or lesions  NECK: no adenopathy, no asymmetry, masses, or scars and thyroid normal to palpation  RESP: lungs clear to auscultation - no rales, rhonchi or wheezes  BREAST: normal without masses, tenderness or nipple discharge and no palpable axillary masses or adenopathy  CV: regular rate and rhythm, normal S1 S2, no S3 or S4, no murmur, click or rub, no peripheral edema and peripheral pulses strong  ABDOMEN: soft, nontender, no hepatosplenomegaly, no masses and bowel sounds normal   (female): normal female external genitalia, normal urethral meatus, vaginal mucosa pink, moist, well rugated, and normal cervix/adnexa/uterus without masses or discharge  MS: no gross musculoskeletal defects noted, no edema  SKIN: no suspicious lesions or rashes  NEURO: Normal strength and tone, mentation intact and speech normal  PSYCH: mentation appears normal, affect normal/bright    ASSESSMENT/PLAN:   (Z00.00) Routine general medical examination at a health care facility  (primary encounter diagnosis)  WITH ROUTINE  (Z12.4) Cervical cancer screening  History of abnormal Pap smear:   3/1/18 ASCUS, Neg HPV  ENDOMETRIUM CURETTAGE:   - Fragments of leiomyoma(s) with hyalinization and degenerative changes   - Inactive endometrium with features, suggestive of benign endometrial   polyp   - Negative for hyperplasia or malignancy     If normal will repeat  PAP every 5 years with  HPV co-testing   S/p hysteroscopy 6/16/2020    Plan: Pap Screen with HPV - recommended age 30 - 65         years            (E87.6) Hypokalemia  Comment: noted previously, on dyazide, which has also been causing significant sun sensitivity.  Plan: recheck bmp, switch to ACE as below    (I10) Hypertension, goal below 140/90  Comment: at goal, see above  Plan: Albumin Random Urine Quantitative with Creat         Ratio, BASIC METABOLIC PANEL, lisinopril          "(ZESTRIL) 20 MG tablet            (D50.0) Iron deficiency anemia due to chronic blood loss  Comment: prior hx  Plan: CBC with Platelets            (N95.2) Postmenopause atrophic vaginitis  Comment: causing dyspareunia, would like to start vaginal estrogen as below  Plan: conjugated estrogens (PREMARIN) 0.625 MG/GM         vaginal cream            (L65.9) Hair loss  Comment: I will rule out thyroid disease, anemia, . I will follow up on abnormal labs as indicated.  Most likely stress vs familial/genetic  Plan: TSH with free T4 reflex            (Z13.220) Screening for hyperlipidemia  Comment:   Plan: Lipid panel reflex to direct LDL Non-fasting              Patient has been advised of split billing requirements and indicates understanding: Yes    COUNSELING:  Reviewed preventive health counseling, as reflected in patient instructions    Estimated body mass index is 24.34 kg/m  as calculated from the following:    Height as of this encounter: 1.543 m (5' 0.75\").    Weight as of this encounter: 57.9 kg (127 lb 12 oz).  Great job, used Noom to lose 20 lbs!      She reports that she quit smoking about 11 years ago. Her smoking use included cigarettes. She has a 8.50 pack-year smoking history. She has never used smokeless tobacco.      Counseling Resources:  ATP IV Guidelines  Pooled Cohorts Equation Calculator  Breast Cancer Risk Calculator  BRCA-Related Cancer Risk Assessment: FHS-7 Tool  FRAX Risk Assessment  ICSI Preventive Guidelines  Dietary Guidelines for Americans, 2010  USDA's MyPlate  ASA Prophylaxis  Lung CA Screening    Sharlene Baltazar MD  [facility]  "

## 2022-05-26 NOTE — PATIENT INSTRUCTIONS
Shingles vaccine  I strongly recommend getting the shingles vaccine (Shingrix) if you are over age 50, but please check with your insurance to see how much you might have to pay for this vaccine! If you are on most insurance plans including Medicare, it's covered if you get it at a pharmacy but not in a clinic.    This shot will prevent shingles, a painful skin rash that you are at risk for getting if you have ever had chicken pox. Sometimes the pain will last the rest of your life, even after the rash has healed, and there is not much that we can do to relieve the pain. The vaccine is 98% effective at preventing shingles.    Please consider getting this vaccine! You'll need #2 vaccines 2-4 months apart to be protected.      Preventive Health Recommendations  Female Ages 50 - 64    Yearly exam: See your health care provider every year in order to  Review health changes.   Discuss preventive care.    Review your medicines if your doctor has prescribed any.    Get a Pap test every three years (unless you have an abnormal result and your provider advises testing more often).  If you get Pap tests with HPV test, you only need to test every 5 years, unless you have an abnormal result.   You do not need a Pap test if your uterus was removed (hysterectomy) and you have not had cancer.  You should be tested each year for STDs (sexually transmitted diseases) if you're at risk.   Have a mammogram every 1 to 2 years.  Have a colonoscopy at age 50, or have a yearly FIT test (stool test). These exams screen for colon cancer.    Have a cholesterol test every 5 years, or more often if advised.  Have a diabetes test (fasting glucose) every three years. If you are at risk for diabetes, you should have this test more often.   If you are at risk for osteoporosis (brittle bone disease), think about having a bone density scan (DEXA).    Shots: Get a flu shot each year. Get a tetanus shot every 10 years.    Nutrition:   Eat at least 5  servings of fruits and vegetables each day.  Eat whole-grain bread, whole-wheat pasta and brown rice instead of white grains and rice.  Get adequate Calcium and Vitamin D.     Lifestyle  Exercise at least 150 minutes a week (30 minutes a day, 5 days a week). This will help you control your weight and prevent disease.  Limit alcohol to one drink per day.  No smoking.   Wear sunscreen to prevent skin cancer.   See your dentist every six months for an exam and cleaning.  See your eye doctor every 1 to 2 years.

## 2022-05-27 LAB
ANION GAP SERPL CALCULATED.3IONS-SCNC: 2 MMOL/L (ref 3–14)
BUN SERPL-MCNC: 15 MG/DL (ref 7–30)
CALCIUM SERPL-MCNC: 10.1 MG/DL (ref 8.5–10.1)
CHLORIDE BLD-SCNC: 106 MMOL/L (ref 94–109)
CHOLEST SERPL-MCNC: 176 MG/DL
CO2 SERPL-SCNC: 32 MMOL/L (ref 20–32)
CREAT SERPL-MCNC: 0.73 MG/DL (ref 0.52–1.04)
FASTING STATUS PATIENT QL REPORTED: NO
GFR SERPL CREATININE-BSD FRML MDRD: >90 ML/MIN/1.73M2
GLUCOSE BLD-MCNC: 87 MG/DL (ref 70–99)
HDLC SERPL-MCNC: 50 MG/DL
LDLC SERPL CALC-MCNC: 109 MG/DL
NONHDLC SERPL-MCNC: 126 MG/DL
POTASSIUM BLD-SCNC: 3.6 MMOL/L (ref 3.4–5.3)
SODIUM SERPL-SCNC: 140 MMOL/L (ref 133–144)
TRIGL SERPL-MCNC: 83 MG/DL
TSH SERPL DL<=0.005 MIU/L-ACNC: 1.01 MU/L (ref 0.4–4)

## 2022-05-31 ENCOUNTER — MYC MEDICAL ADVICE (OUTPATIENT)
Dept: FAMILY MEDICINE | Facility: CLINIC | Age: 57
End: 2022-05-31
Payer: COMMERCIAL

## 2022-05-31 DIAGNOSIS — I10 HYPERTENSION, GOAL BELOW 140/90: ICD-10-CM

## 2022-05-31 LAB
BKR LAB AP GYN ADEQUACY: NORMAL
BKR LAB AP GYN INTERPRETATION: NORMAL
BKR LAB AP HPV REFLEX: NORMAL
BKR LAB AP LMP: NORMAL
BKR LAB AP PREVIOUS ABNORMAL: NORMAL
PATH REPORT.COMMENTS IMP SPEC: NORMAL
PATH REPORT.COMMENTS IMP SPEC: NORMAL
PATH REPORT.RELEVANT HX SPEC: NORMAL

## 2022-06-02 LAB
HUMAN PAPILLOMA VIRUS 16 DNA: NEGATIVE
HUMAN PAPILLOMA VIRUS 18 DNA: NEGATIVE
HUMAN PAPILLOMA VIRUS FINAL DIAGNOSIS: NORMAL
HUMAN PAPILLOMA VIRUS OTHER HR: NEGATIVE

## 2022-06-02 RX ORDER — HYDROCHLOROTHIAZIDE 25 MG/1
25 TABLET ORAL DAILY
Qty: 90 TABLET | Refills: 3 | Status: SHIPPED | OUTPATIENT
Start: 2022-06-02 | End: 2022-06-03 | Stop reason: ALTCHOICE

## 2022-06-02 NOTE — TELEPHONE ENCOUNTER
ASSESSMENT / PLAN:  (I10) Hypertension, goal below 140/90    Plan: hydrochlorothiazide (HYDRODIURIL) 25 MG tablet,        Basic metabolic panel  (Ca, Cl, CO2, Creat,         Gluc, K, Na, BUN)          Oh no, I'm sorry you felt bad!  Certainly discontinue it,and I'll put it back on your allergy list!    I sent the hydrochlorothiazide prescription to our pharmacy at [place].    Please schedule a lab appointment in about one month to check a basic metabolic panel, as hydrochlorothiazide can impact sodium and potassium, and I just want to make sure everything looks good.    If you have any questions, please contact the clinic or schedule an appointment with me, thank you!    Sincerely,  Dr. Sharlene Baltazar MD

## 2022-06-02 NOTE — TELEPHONE ENCOUNTER
Dr. Baltazar, message from patient via [employer] regarding current treatment regimen.      Thank you    CATHERINE FreedmanN, RN  [facility]

## 2022-06-03 DIAGNOSIS — E87.6 HYPOKALEMIA: ICD-10-CM

## 2022-06-03 DIAGNOSIS — I10 HYPERTENSION, GOAL BELOW 140/90: ICD-10-CM

## 2022-06-03 RX ORDER — TRIAMTERENE AND HYDROCHLOROTHIAZIDE 37.5; 25 MG/1; MG/1
1 CAPSULE ORAL EVERY MORNING
Qty: 30 CAPSULE | Refills: 3 | Status: SHIPPED | OUTPATIENT
Start: 2022-06-03 | End: 2022-12-27

## 2022-07-18 ENCOUNTER — E-VISIT (OUTPATIENT)
Dept: URGENT CARE | Facility: CLINIC | Age: 57
End: 2022-07-18
Payer: COMMERCIAL

## 2022-07-18 DIAGNOSIS — R19.7 DIARRHEA, UNSPECIFIED TYPE: Primary | ICD-10-CM

## 2022-07-18 PROCEDURE — 99421 OL DIG E/M SVC 5-10 MIN: CPT | Performed by: PHYSICIAN ASSISTANT

## 2022-07-18 NOTE — PATIENT INSTRUCTIONS
Viral Diarrhea (Adult)    Diarrhea caused by a virus is often called viral gastroenteritis. Many people call it the stomach flu, but it has nothing to do with the flu. The virus that causes diarrhea affects the stomach and intestinal tract. It often lasts from 2 to 7 days. Diarrhea is the passing of loose, watery stools 3 or more times a day.   Symptoms  Along with diarrhea, you may have these symptoms:    Belly (abdominal) pain and cramping    Nausea and vomiting    Loss of bowel control    Fever and chills    Bloody stools  The danger from repeated diarrhea is dehydration. This is when your body loses too much water and other fluids.   Antibiotics don't work well in treating this illness. But there are things you can do at home that will help.   Home care  Follow these home care tips:    If symptoms are severe, rest at home for the next 24 hours or until you are feeling better.    Wash your hands with soap and water or an alcohol-based . This helps prevent the spread of infection. Wash your hands after touching anyone who is sick.    Teach all people in your home when and how to wash their hands Wet your hands with clean, running water. Lather the backs of your hands, between your fingers, and under your nails. Scrub your hands for at least 20 seconds. If you need a timer, try humming the  Happy Birthday  song from beginning to end twice. Rinse your hands well. Dry them with a clean towel.    Wash your hands after using the toilet and before meals. Clean the toilet after each use.  Food preparation:    People with diarrhea should not make food for others. When making food, wash your hands after touching anyone who is sick.    Wash your hands after using items that have been in contact with raw food. This includes cutting boards, countertops, and knives.    Keep uncooked meats away from cooked and ready-to-eat foods.  Medicines:    You may use acetaminophen or nonsteroidal anti-inflammatory drugs  (NSAIDS) such as ibuprofen or naproxen to control fever unless another medicine was prescribed. In addition:  ? Talk with your healthcare provider before using these medicines if you have chronic liver or kidney disease, or ever had a stomach ulcer or GI (gastrointestinal) bleeding.  ? Don t give aspirin (or medicine that contains aspirin) to anyone younger than age 19 unless directed by the provider. Taking aspirin can put them at risk for Reye syndrome. This is a rare but very serious disorder. It most often affects the brain and the liver.  ? Don't use NSAID medicines if you are already taking one for another condition (such as arthritis) or if you are taking aspirin (such as for heart disease or after a stroke).    Anti-diarrhea medicine should be taken for this condition only if advised by your healthcare provider. Sometimes it can make your condition worse. If you have bloody diarrhea or fever, check with your provider before taking this type of medicine.  Diet:    Water and clear liquids are important so you don't get dehydrated. Drink small amounts at a time. Don't guzzle it down. If you are very dehydrated, sports drinks aren't a good choice. They have too much sugar and not enough electrolytes. In this case, commercially available products called oral rehydration solutions are best.    Caffeine, tobacco, and alcohol can make the diarrhea, cramping, and pain worse. Try to stop using these until you are fully recovered.    Don't force yourself to eat, especially if you have cramping, vomiting, or diarrhea. Don't eat large amounts at a time, even if you are hungry. It may make you feel worse.    If you eat, don't have fatty, greasy, spicy, or fried foods.    Don't have any dairy products, as they can make diarrhea worse.  During the first 24 hours (the first full day) follow the diet below:     Drinks: Water, clear liquids, soft drinks without caffeine; ginger ale, mineral water (plain or flavored),  decaffeinated tea and coffee    Soups: Clear broth, consommé, and bouillon    Desserts: Plain gelatin, ice pops, and fruit juice bars  During the next 24 hours (the second day) you may add these to the above if you are feeling better:     Hot cereal, plain toast, bread, rolls, crackers    Plain noodles, rice, mashed potatoes, chicken noodle or rice soup    Unsweetened canned fruit such as applesauce and bananas (not pineapple and citrus)    Limit fat intake to less than 15 grams per day. Don't eat margarine, butter, oils, mayonnaise, sauces, gravies, fried foods, peanut butter, meat, poultry, and fish.    Limit fiber. Don't eat raw or cooked vegetables, fresh fruits (except bananas), and bran cereals.    Limit caffeine and chocolate. No spices or seasonings except salt.  During the next 24 hours:    Slowly go back to a normal diet, as you feel better and your symptoms ease.    If at any time the diarrhea or cramping gets worse, go back to the simpler diet (above) or to clear liquids.  Follow-up care  Follow up with your healthcare provider, or as advised. Call if you aren't getting better in 24 hours or if the diarrhea lasts more than 1 week. This is even more important if you are in a high-risk group, such as:     Being an older adult    Having a weak immune system (such as from cancer treatment)    Having inflammatory bowel disease (Crohn's disease or colitis)    If a stool (diarrhea) sample was taken, you may call in 2 days (or as directed) for the results.   When to get medical advice  Call your healthcare provider right away if any of these occur:     More belly pain or constant lower right belly pain    Lasting vomiting (can't keep liquids down)    Frequent diarrhea (more than 5 times a day)    Blood in vomit or stool (black or red color)    Eating or drinking less    Dark urine, reduced urine output    Weakness, dizziness    Drowsiness    Fever of 100.4 F (38 C) or higher, or as directed by your  provider    New rash    Symptoms get worse or you have new symptoms  Call 911  Call 911 if any of these occur:     Trouble breathing    Feeling confused    Severe drowsiness or trouble waking up    Fainting or loss of consciousness    Fast heart rate    Seizure    Stiff neck  Sarahi last reviewed this educational content on 2/1/2021 2000-2021 [employer]. All rights reserved. This information is not intended as a substitute for professional medical care. Always follow your healthcare professional's instructions.

## 2022-10-30 ENCOUNTER — HEALTH MAINTENANCE LETTER (OUTPATIENT)
Age: 57
End: 2022-10-30

## 2023-05-25 ENCOUNTER — TRANSCRIBE ORDERS (OUTPATIENT)
Dept: OTHER | Age: 58
End: 2023-05-25

## 2023-05-25 ENCOUNTER — ANCILLARY PROCEDURE (OUTPATIENT)
Dept: MAMMOGRAPHY | Facility: CLINIC | Age: 58
End: 2023-05-25
Attending: FAMILY MEDICINE
Payer: COMMERCIAL

## 2023-05-25 DIAGNOSIS — Z12.31 VISIT FOR SCREENING MAMMOGRAM: ICD-10-CM

## 2023-05-25 DIAGNOSIS — E21.3 HYPERPARATHYROIDISM (H): Primary | ICD-10-CM

## 2023-05-25 PROCEDURE — 77063 BREAST TOMOSYNTHESIS BI: CPT | Mod: TC | Performed by: RADIOLOGY

## 2023-05-25 PROCEDURE — 77067 SCR MAMMO BI INCL CAD: CPT | Mod: TC | Performed by: RADIOLOGY

## 2023-05-26 DIAGNOSIS — E83.52 HYPERCALCEMIA: Primary | ICD-10-CM

## 2023-05-26 DIAGNOSIS — M81.0 AGE-RELATED OSTEOPOROSIS WITHOUT CURRENT PATHOLOGICAL FRACTURE: ICD-10-CM

## 2023-06-29 NOTE — CONFIDENTIAL NOTE
RECORDS RECEIVED FROM: internal /ce    DATE RECEIVED: 9.13.23    NOTES (FOR ALL VISITS) STATUS DETAILS   OFFICE NOTES from referring provider internal  Sharlene Baltazar MD Cozadd, Andrew, PA-C   OFFICE NOTES from other specialist ce    5.22.23- Erendira  5.17.23 Sonido   4.24.23 Elias   3.21.23 Luann      MEDICATION LIST internal       LABS     DIABETES: HBGA1C, CREATININE, FASTING LIPIDS, MICROALBUMIN URINE, POTASSIUM, TSH, T4    THYROID: TSH, T4, CBC, THYRODLONULIN, TOTAL T3, FREE T4, CALCITONIN, CEA internal /ce Alkaline- 5.22.23  BMP- 5.22.23  TSH- 5.22.23  Vitamin D- 5.22.23  T4- 5.26.22  Lipid- 5.26.22

## 2023-07-01 ENCOUNTER — HEALTH MAINTENANCE LETTER (OUTPATIENT)
Age: 58
End: 2023-07-01

## 2023-08-09 ENCOUNTER — E-VISIT (OUTPATIENT)
Dept: URGENT CARE | Facility: CLINIC | Age: 58
End: 2023-08-09
Payer: COMMERCIAL

## 2023-08-09 DIAGNOSIS — N39.0 ACUTE UTI (URINARY TRACT INFECTION): Primary | ICD-10-CM

## 2023-08-09 PROCEDURE — 99207 PR NON-BILLABLE SERV PER CHARTING: CPT | Performed by: NURSE PRACTITIONER

## 2023-08-09 RX ORDER — SULFAMETHOXAZOLE/TRIMETHOPRIM 800-160 MG
1 TABLET ORAL 2 TIMES DAILY
Qty: 6 TABLET | Refills: 0 | Status: SHIPPED | OUTPATIENT
Start: 2023-08-09 | End: 2023-08-12

## 2023-08-10 NOTE — PATIENT INSTRUCTIONS
Dear Augustus Mcgrath    After reviewing your responses, I've been able to diagnose you with a urinary tract infection, which is a common infection of the bladder with bacteria.  This is not a sexually transmitted infection, though urinating immediately after intercourse can help prevent infections.  Drinking lots of fluids is also helpful to clear your current infection and prevent the next one.      I have sent a prescription for antibiotics to your pharmacy to treat this infection.    It is important that you take all of your prescribed medication even if your symptoms are improving after a few doses.  Taking all of your medicine helps prevent the symptoms from returning.     If your symptoms worsen, you develop pain in your back or stomach, develop fevers, or are not improving in 5 days, please contact your primary care provider for an appointment or visit any of our convenient Walk-in or Urgent Care Centers to be seen, which can be found on our website here.    Thanks again for choosing us as your health care partner,    Antonella Jiménez, CNP

## 2023-09-13 ENCOUNTER — PRE VISIT (OUTPATIENT)
Dept: ENDOCRINOLOGY | Facility: CLINIC | Age: 58
End: 2023-09-13

## 2023-10-15 ENCOUNTER — VIRTUAL VISIT (OUTPATIENT)
Dept: URGENT CARE | Facility: CLINIC | Age: 58
End: 2023-10-15
Payer: COMMERCIAL

## 2023-10-15 DIAGNOSIS — J01.90 ACUTE SINUSITIS WITH SYMPTOMS GREATER THAN 10 DAYS: Primary | ICD-10-CM

## 2023-10-15 PROCEDURE — 99213 OFFICE O/P EST LOW 20 MIN: CPT | Mod: 95

## 2023-10-15 NOTE — PROGRESS NOTES
Assessment & Plan     Acute sinusitis with symptoms greater than 10 days  Antibiotics  Sudafed  Mulu pot  afrin  - amoxicillin-clavulanate (AUGMENTIN) 875-125 MG tablet  Dispense: 20 tablet; Refill: 0             No follow-ups on file.    Virtual Urgent Care  [facility] VIRTUAL URGENT CARE    Rico Coffman is a 57 year old female who presents to clinic today for the following health issues:  No chief complaint on file.    HPI    Visit was a video virtual visit.    Few weeks ago with allergy. Claritin.  Had a plugged ear on right.  Wednesday worse with congestion of the head.  Sinus headache.  OTC meds not helping.  Sudafed was taken and helped.  No fevers.  Coughing  Uses mulu pot.        Review of Systems        Objective    There were no vitals taken for this visit.  Physical Exam  Neurological:      Mental Status: She is alert.

## 2023-10-15 NOTE — PATIENT INSTRUCTIONS
Use the mulu pot and sudafed.    Use afrin nasal for 2-3 days to open up plugged nose    I sent in 10 days of augmentin antibiotic.

## 2023-10-24 ENCOUNTER — OFFICE VISIT (OUTPATIENT)
Dept: FAMILY MEDICINE | Facility: CLINIC | Age: 58
End: 2023-10-24
Payer: COMMERCIAL

## 2023-10-24 VITALS
RESPIRATION RATE: 15 BRPM | TEMPERATURE: 97.3 F | BODY MASS INDEX: 24.35 KG/M2 | WEIGHT: 124 LBS | SYSTOLIC BLOOD PRESSURE: 131 MMHG | DIASTOLIC BLOOD PRESSURE: 83 MMHG | HEART RATE: 58 BPM | HEIGHT: 60 IN | OXYGEN SATURATION: 99 %

## 2023-10-24 DIAGNOSIS — Z23 HIGH PRIORITY FOR 2019-NCOV VACCINE: ICD-10-CM

## 2023-10-24 DIAGNOSIS — I10 HYPERTENSION, GOAL BELOW 140/90: ICD-10-CM

## 2023-10-24 DIAGNOSIS — H61.21 IMPACTED CERUMEN OF RIGHT EAR: ICD-10-CM

## 2023-10-24 DIAGNOSIS — Z01.818 PREOP GENERAL PHYSICAL EXAM: Primary | ICD-10-CM

## 2023-10-24 DIAGNOSIS — E21.3 HYPERPARATHYROIDISM (H): ICD-10-CM

## 2023-10-24 DIAGNOSIS — E83.52 HYPERCALCEMIA: ICD-10-CM

## 2023-10-24 DIAGNOSIS — E87.6 HYPOKALEMIA: ICD-10-CM

## 2023-10-24 PROCEDURE — 80048 BASIC METABOLIC PNL TOTAL CA: CPT | Performed by: NURSE PRACTITIONER

## 2023-10-24 PROCEDURE — 99214 OFFICE O/P EST MOD 30 MIN: CPT | Mod: 25 | Performed by: NURSE PRACTITIONER

## 2023-10-24 PROCEDURE — 69209 REMOVE IMPACTED EAR WAX UNI: CPT | Mod: RT | Performed by: NURSE PRACTITIONER

## 2023-10-24 PROCEDURE — 36415 COLL VENOUS BLD VENIPUNCTURE: CPT | Performed by: NURSE PRACTITIONER

## 2023-10-24 PROCEDURE — 91320 SARSCV2 VAC 30MCG TRS-SUC IM: CPT | Performed by: NURSE PRACTITIONER

## 2023-10-24 PROCEDURE — 90480 ADMN SARSCOV2 VAC 1/ONLY CMP: CPT | Performed by: NURSE PRACTITIONER

## 2023-10-24 ASSESSMENT — PAIN SCALES - GENERAL: PAINLEVEL: NO PAIN (0)

## 2023-10-24 NOTE — PROGRESS NOTES
Post Ear Irrigation exam completed after MA ear wash and cerumen plug removed, tympanic membrane intact, and no bleeding noted. Pain assessment completed.     Patient tolerated procedure:  yes     Florina Larry RN  [facility]

## 2023-10-24 NOTE — NURSING NOTE
Patient identified using two patient identifiers.  Ear exam showing wax occlusion completed by RN.  Solution: warm water was placed in the right ear(s) via irrigation tool: elephant ear          Hailey Wiggins MA  .

## 2023-10-24 NOTE — PROGRESS NOTES
[address]  [address]  [place] [address]  Phone: [phone]  Fax: [phone]  Primary Provider: Sharlene Baltazar  Pre-op Performing Provider: RENE OMALLEY      PREOPERATIVE EVALUATION:  Today's date: 10/24/2023    Augustus is a 58 year old female who presents for a preoperative evaluation.      10/24/2023     2:22 PM   Additional Questions   Roomed by Mary Escoto       Surgical Information:  Surgery/Procedure: PARATHYROIDECTOMY  Surgery Location: [facility]   Surgeon: Anthony Newton MD  Surgery Date: 11/8/23  Time of Surgery: TBD  Where patient plans to recover: At home with family  Fax number for surgical facility:   [phone]   Assessment & Plan     The proposed surgical procedure is considered INTERMEDIATE risk.    Preop general physical exam  preoperative examination wnl; no concerning health issues identified prior to surgery; reviewed cardiac risk, chronic health history, medications and discussed presurgical medication management. Patient deemed medically stable for planned surgery.  Plan: will check the following:    Hyperparathyroidism (H24)  Planned surgical intervention    Hypokalemia  Hypercalcemia  - Basic metabolic panel  - Basic metabolic panel    Hypertension, goal below 140/90    Monitor home blood pressure 2-3 times per week; notify if above goal <130/80; dietary and exercise changes to improve blood pressure   Basic metabolic panel  - Basic metabolic panel    High priority for 2019-nCoV vaccine    COVID-19 12+ (2023-24) (PFIZER) (Completed)          Qty-1, Normal, Routine       Impacted cerumen of right ear  Unable to visualize right t TM; MA cleaning today in clinic; recheck TM visualize intact; tolerated cleaning well  - IA REMOVAL IMPACTED CERUMEN IRRIGATION/LVG UNILAT         - No identified additional risk factors other than previously addressed    Antiplatelet or Anticoagulation Medication Instructions:   - Patient is on  no antiplatelet or anticoagulation medications.    Additional Medication Instructions:   - Diuretics: HOLD on the day of surgery.   - triptans, migraine abortives: HOLD on day of surgery    RECOMMENDATION:  APPROVAL GIVEN to proceed with proposed procedure, without further diagnostic evaluation.      Subjective       HPI related to upcoming procedure:   58 year old  year old female with PMH   Patient Active Problem List   Diagnosis Code    Menorrhagia N92.0    Enthesopathy M77.9    Need for hepatitis C screening test Z11.59    Iron deficiency anemia due to chronic blood loss D50.0    Hypertension, goal below 140/90 I10    ASCUS of cervix with negative high risk HPV R87.610    Migraine with aura G43.109    Abnormal uterine bleeding N93.9    Family history of malignant neoplasm of endometrium Z80.49     in clinic for preoperative examination for upcoming procedure parathyroidectomy. Review of all chronic health conditions, medications, smoking history and COVID status completed to ensure patient medically stable for surgery.         10/19/2023     9:28 AM   Preop Questions   1. Have you ever had a heart attack or stroke? No   2. Have you ever had surgery on your heart or blood vessels, such as a stent placement, a coronary artery bypass, or surgery on an artery in your head, neck, heart, or legs? No   3. Do you have chest pain with activity? No   4. Do you have a history of  heart failure? No   5. Do you currently have a cold, bronchitis or symptoms of other infection? No   6. Do you have a cough, shortness of breath, or wheezing? No   7. Do you or anyone in your family have previous history of blood clots? No   8. Do you or does anyone in your family have a serious bleeding problem such as prolonged bleeding following surgeries or cuts? No   9. Have you ever had problems with anemia or been told to take iron pills? YES -    10. Have you had any abnormal blood loss such as black, tarry or bloody stools, or abnormal  vaginal bleeding? No   11. Have you ever had a blood transfusion? No   12. Are you willing to have a blood transfusion if it is medically needed before, during, or after your surgery? Yes   13. Have you or any of your relatives ever had problems with anesthesia? No   14. Do you have sleep apnea, excessive snoring or daytime drowsiness? No   15. Do you have any artifical heart valves or other implanted medical devices like a pacemaker, defibrillator, or continuous glucose monitor? No   16. Do you have artificial joints? No   17. Are you allergic to latex? No   18. Is there any chance that you may be pregnant? No       Health Care Directive:  Patient does not have a Health Care Directive or Living Will: Discussed advance care planning with patient; however, patient declined at this time.    Preoperative Review of :   reviewed - no record of controlled substances prescribed.      Status of Chronic Conditions:  HYPERTENSION - Patient has longstanding history of HTN , currently denies any symptoms referable to elevated blood pressure. Specifically denies chest pain, palpitations, dyspnea, orthopnea, PND or peripheral edema. Blood pressure readings have been in normal range. Current medication regimen is as listed below. Patient denies any side effects of medication.     Review of Systems  CONSTITUTIONAL: NEGATIVE for fever, chills, change in weight  INTEGUMENTARY/SKIN: NEGATIVE for worrisome rashes, moles or lesions  EYES: NEGATIVE for vision changes or irritation  ENT/MOUTH: NEGATIVE for ear, mouth and throat problems  RESP: NEGATIVE for significant cough or SOB  CV: NEGATIVE for chest pain, palpitations or peripheral edema  GI: NEGATIVE for nausea, abdominal pain, heartburn, or change in bowel habits  : NEGATIVE for frequency, dysuria, or hematuria  MUSCULOSKELETAL: NEGATIVE for significant arthralgias or myalgia  NEURO: NEGATIVE for weakness, dizziness or paresthesias  ENDOCRINE: NEGATIVE for temperature  intolerance, skin/hair changes  HEME: NEGATIVE for bleeding problems  PSYCHIATRIC: NEGATIVE for changes in mood or affect    Patient Active Problem List    Diagnosis Date Noted    Abnormal uterine bleeding 05/19/2020     Priority: Medium     Added automatically from request for surgery [id number]      Family history of malignant neoplasm of endometrium 05/19/2020     Priority: Medium     Added automatically from request for surgery [id number]      Migraine with aura 01/02/2019     Priority: Medium     Sometimes gets migraine equivalent      ASCUS of cervix with negative high risk HPV 03/01/2018     Priority: Medium     3/1/18 ASCUS, Neg HPV. Plan 3 yr co-test  5/26/22 NIL, Neg HPV. Plan 5 yr co-test      Hypertension, goal below 140/90 08/01/2017     Priority: Medium    Iron deficiency anemia due to chronic blood loss 11/28/2016     Priority: Medium    Need for hepatitis C screening test 11/22/2016     Priority: Medium    Enthesopathy 08/22/2013     Priority: Medium     Problem list name updated by automated process. Provider to review      Menorrhagia 11/02/2009     Priority: Medium      Past Medical History:   Diagnosis Date    ASCUS of cervix with negative high risk HPV 03/01/2018    3/1/18 ASCUS, Neg HPV    Complication of anesthesia     ponv    Contraception      has vasectomy    Eye problems 1996    right eye, retinal detachment, LEFT EY PROBLEMATIC AS WELL    Hyperparathyroidism (H24)     Hypertension 2017    Iron deficiency anemia due to chronic blood loss 11/28/2016    MGUS (monoclonal gammopathy of unknown significance)     Osteoporosis      Past Surgical History:   Procedure Laterality Date    OPERATIVE HYSTEROSCOPY WITH MORCELLATOR N/A 6/15/2020    Procedure: Hysteroscopy with morcellator;  Surgeon: Wendy Hernandez MD;  Location: [facility] OR    retinal surgery  1986    scleral buckle and several lasers both eyes    VITRECTOMY ANTERIOR  8/28/13    [facility]     Current Outpatient Medications    Medication Sig Dispense Refill    conjugated estrogens (PREMARIN) 0.625 MG/GM vaginal cream Place 1 g vaginally three times a week 30 g 3    Multiple Vitamins-Minerals (PRESERVISION AREDS PO) Preservision      ondansetron (ZOFRAN-ODT) 4 MG ODT tab Take 1 tablet (4 mg) by mouth every 8 hours as needed for nausea 12 tablet 1    SUMAtriptan (IMITREX) 50 MG tablet Take 1 tablet (50 mg) by mouth at onset of headache for migraine May repeat every  2 hours until symptoms resolved. Max 4 tablets/24 hours. 30 tablet 1    triamterene-HCTZ (DYAZIDE) 37.5-25 MG capsule TAKE ONE CAPSULE BY MOUTH EVERY MORNING 30 capsule 11       Allergies   Allergen Reactions    Lisinopril Other (See Comments)     Feels horrible        Social History     Tobacco Use    Smoking status: Former     Packs/day: 0.50     Years: 17.00     Additional pack years: 0.00     Total pack years: 8.50     Types: Cigarettes     Quit date: 11/10/2010     Years since quittin.9    Smokeless tobacco: Never    Tobacco comments:     occ   Substance Use Topics    Alcohol use: Yes     Comment: 2 drinks a week      Family History   Problem Relation Age of Onset    Hypertension Mother     Heart Disease Mother 85    Heart Failure Mother          of renal failure related to cardiac testing    Lipids Father     Heart Disease Father 85         suddenly while doing yardwork    Hyperlipidemia Father     Colon Cancer Father     Breast Cancer Sister 52    Uterine Cancer Sister         negative genetic testing.  No chemo or radiation    Hypertension Sister 52    Thyroid Disease Sister     Uterine Cancer Sister         had recurrence, failed initial treatment for this.  Now doing chemo and radiation ()    Cerebrovascular Disease Maternal Grandfather     Cerebrovascular Disease Paternal Grandfather      History   Drug Use No         Objective     /83 (BP Location: Right arm, Patient Position: Sitting, Cuff Size: Adult Regular)   Pulse 58   Temp 97.3  F  "(36.3  C) (Temporal)   Resp 15   Ht 1.53 m (5' 0.24\")   Wt 56.2 kg (124 lb)   SpO2 99%   BMI 24.03 kg/m      Physical Exam    GENERAL APPEARANCE: healthy, alert and no distress     EYES: EOMI, PERRL     HENT: ear canals and TM's normal and nose and mouth without ulcers or lesions     NECK: no adenopathy, no asymmetry, masses, or scars and thyroid normal to palpation     RESP: lungs clear to auscultation - no rales, rhonchi or wheezes     CV: regular rates and rhythm, normal S1 S2, no S3 or S4 and no murmur, click or rub     ABDOMEN:  soft, nontender, no HSM or masses and bowel sounds normal     MS: extremities normal- no gross deformities noted, no evidence of inflammation in joints, FROM in all extremities.     SKIN: no suspicious lesions or rashes     NEURO: Normal strength and tone, sensory exam grossly normal, mentation intact and speech normal     PSYCH: mentation appears normal. and affect normal/bright     LYMPHATICS: No cervical adenopathy    Recent Labs   Lab Test 05/26/22  0926   HGB 15.9*         POTASSIUM 3.6   CR 0.73        Diagnostics:  Labs pending at this time.  Results will be reviewed when available.   No EKG required, no history of coronary heart disease, significant arrhythmia, peripheral arterial disease or other structural heart disease.    Revised Cardiac Risk Index (RCRI):  The patient has the following serious cardiovascular risks for perioperative complications:   - No serious cardiac risks = 0 points     RCRI Interpretation: 0 points: Class I (very low risk - 0.4% complication rate)         Signed Electronically by: ROSANGELA Overton CNP  Copy of this evaluation report is provided to requesting physician.      "

## 2023-10-25 LAB
ANION GAP SERPL CALCULATED.3IONS-SCNC: 8 MMOL/L (ref 7–15)
BUN SERPL-MCNC: 20.2 MG/DL (ref 6–20)
CALCIUM SERPL-MCNC: 11.1 MG/DL (ref 8.6–10)
CHLORIDE SERPL-SCNC: 100 MMOL/L (ref 98–107)
CREAT SERPL-MCNC: 0.82 MG/DL (ref 0.51–0.95)
DEPRECATED HCO3 PLAS-SCNC: 30 MMOL/L (ref 22–29)
EGFRCR SERPLBLD CKD-EPI 2021: 83 ML/MIN/1.73M2
GLUCOSE SERPL-MCNC: 92 MG/DL (ref 70–99)
POTASSIUM SERPL-SCNC: 3.6 MMOL/L (ref 3.4–5.3)
SODIUM SERPL-SCNC: 138 MMOL/L (ref 135–145)

## 2023-11-01 ENCOUNTER — OFFICE VISIT (OUTPATIENT)
Dept: OBGYN | Facility: CLINIC | Age: 58
End: 2023-11-01
Payer: COMMERCIAL

## 2023-11-01 VITALS
OXYGEN SATURATION: 100 % | DIASTOLIC BLOOD PRESSURE: 74 MMHG | WEIGHT: 124 LBS | HEART RATE: 73 BPM | BODY MASS INDEX: 24.03 KG/M2 | SYSTOLIC BLOOD PRESSURE: 143 MMHG

## 2023-11-01 DIAGNOSIS — N95.0 POST-MENOPAUSAL BLEEDING: Primary | ICD-10-CM

## 2023-11-01 PROCEDURE — 99203 OFFICE O/P NEW LOW 30 MIN: CPT | Performed by: OBSTETRICS & GYNECOLOGY

## 2023-11-01 NOTE — PROGRESS NOTES
GYN Clinic Note  Date: 2023    CC:  Postmenopausal Bleeding    HPI:  Augustus Mcgrath is a 58 year old female  presents for evaluation of postmenopausal bleeding as a referral from Sharlene Baltazar. She has been postmenopausal for 3 years.   Once bleeding resolved after hysteroscopy in 2020, she never got another period.    Spotting since 10/18.  No rico bleeding; just spotting.  No cramping.  Bleeding hasn't been daily, but intermittent.    Also has concern due to family history of uterine cancer.  Already had one sister who had it, but another sister was diagnosed recently.  She reportedly had negative genetic testing.  (Third sister has had breast cancer)      Also dealing with some other health concerns. Broke arm in March.  Found to have osteoporosis.  Evaluated and found to hyperparathyroidism.  Surgery  for this.  Also found monoclonal protein in blood.  Pretty just MGUS.  Just monitoring for now.      Her work-up thus far for her abnormal bleeding has included:  - TSH: normal in May 2023  - transvaginal ultrasound: none  - Hgb: 14.7 in 2023     GYN HISTORY:  No LMP recorded. (Menstrual status: UNKNOWN).   Menarche: 11-15yo  Menopause: 55  History of abnormal pap: Hx ASCUS, HPV neg in 2018.   Pap currently UTD  History of cervical procedures: none   Contraceptive History: none    Patient has following risk factors for endometrial cancer:  Unopposed estrogen exposure: No  Obesity: Body mass index is 24.03 kg/m .   Smoking: No  Nulliparity: No  Infertility: No  Early age of menarche / late age of menopause: Menarche 11-13yo.  Menopause 55, slightly later than average.  Family history of colon cancer, ovarian cancer, and type I endometrial cancer:  Yes. Details: One sister with breast cancer, two with uterine cancer.  Dad also likely had colon cancer.    OBSTETRIC HISTORY:   OB History    Para Term  AB Living   1 0 0 0 1 0   SAB IAB Ectopic Multiple Live Births    0 1 0 0 0      # Outcome Date GA Lbr Tono/2nd Weight Sex Delivery Anes PTL Lv   1 IAB                  Past Medical History:   Diagnosis Date    ASCUS of cervix with negative high risk HPV 2018    3/1/18 ASCUS, Neg HPV    Complication of anesthesia     ponv    Contraception      has vasectomy    Eye problems     right eye, retinal detachment, LEFT EY PROBLEMATIC AS WELL    Hypertension 2017    Iron deficiency anemia due to chronic blood loss 2016       Past Surgical History:   Procedure Laterality Date    OPERATIVE HYSTEROSCOPY WITH MORCELLATOR N/A 6/15/2020    Procedure: Hysteroscopy with morcellator;  Surgeon: Wendy Hernandez MD;  Location: [facility] OR    retinal surgery      scleral buckle and several lasers both eyes    VITRECTOMY ANTERIOR  13    [facility]     Family History   Problem Relation Age of Onset    Hypertension Mother     Heart Disease Mother 85    Heart Failure Mother          of renal failure related to cardiac testing    Lipids Father     Heart Disease Father 85         suddenly while doing yardwork    Hyperlipidemia Father     Cerebrovascular Disease Maternal Grandfather     Cerebrovascular Disease Paternal Grandfather     Thyroid Disease Sister         hypo    Breast Cancer Sister 52    Hypertension Sister 52    Thyroid Disease Sister     Uterine Cancer Sister        Current Outpatient Medications   Medication Sig Dispense Refill    conjugated estrogens (PREMARIN) 0.625 MG/GM vaginal cream Place 1 g vaginally three times a week 30 g 3    Multiple Vitamins-Minerals (PRESERVISION AREDS PO) Preservision      ondansetron (ZOFRAN-ODT) 4 MG ODT tab Take 1 tablet (4 mg) by mouth every 8 hours as needed for nausea 12 tablet 1    SUMAtriptan (IMITREX) 50 MG tablet Take 1 tablet (50 mg) by mouth at onset of headache for migraine May repeat every  2 hours until symptoms resolved. Max 4 tablets/24 hours. 30 tablet 1    triamterene-HCTZ (DYAZIDE) 37.5-25 MG  capsule TAKE ONE CAPSULE BY MOUTH EVERY MORNING 30 capsule 11    famotidine (PEPCID) 40 MG tablet Take 1 tablet (40 mg) by mouth every morning (Patient not taking: Reported on 2023) 90 tablet 3    metoclopramide (REGLAN) 10 MG tablet Take 1 tablet (10 mg) by mouth 4 times daily as needed (nausea) (Patient not taking: Reported on 2022) 30 tablet 3       Allergies: Lisinopril      EXAM:  Blood pressure (!) 143/74, pulse 73, weight 56.2 kg (124 lb), SpO2 100%, not currently breastfeeding.   BMI= Body mass index is 24.03 kg/m .  General:  Pleasant, in no acute distress.  CV:  Normal pulse.  No cyanosis.  Respiratory:  Breathing comfortably.  Ext:  No gross abnormalities.  Neurological:  Normal mental status.  Gait WNL.  Sensation and strength grossly intact  Psych - Appropriate mood and affect.        ASSESSMENT:  Augustus Mcgrath is a 58 year old  who presents with postmenopausal bleeding.     PLAN:  Discussed options of checking endometrial thickness with TVUS and/or doing endometrial sampling.  We did previously attempt EMB in the office, which was unsuccessful due to stenosis of internal os.  For that reason, I did not recommend trying that again.  I do think endometrial sampling is the best option for her given her family history and she is in agreement.  Will schedule hysteroscopy with morcellation.  Had H&P with FP on 10/24/23 for surgery for hyperparathyroidism, so she can just return to me within 30d of surgery for updated H&P.    Risks include bleeding, infection, uterine perforation, cervical laceration, incomplete procedure, injury to other organs.    RTC for pre-op H&P once surgery scheduled.    Wendy Hernandez MD

## 2023-11-01 NOTE — Clinical Note
I put in a case request for Augustus for hysteroscopy.  Noted that she needs H&P by PCP, but then she mentioned she had one on 10/24 for a diff procedure.  She likely wants to schedule with me in Dec, so will need a new one, but I can just do that since she had one with primary care recently.  If you just put her on my schedule for a 15min visit at some point before surgery, that'll work.  Thanks, Wendy Hernandez MD

## 2023-11-02 ENCOUNTER — TELEPHONE (OUTPATIENT)
Dept: OBGYN | Facility: CLINIC | Age: 58
End: 2023-11-02
Payer: COMMERCIAL

## 2023-11-02 NOTE — TELEPHONE ENCOUNTER
Type of surgery: gyn  Location of surgery: [facility] OR  Date and time of surgery: 12/15/23 3PM  Surgeon: David  Pre-Op Appt Date: 12/08/23 SK (surgeon)  Post-Op Appt Date: 01/05/24   Packet sent out: Yes  Pre-cert/Authorization completed:  Not Applicable  Date: 11/02/23     ARMAAN Rodriguez/her/hers  [facility] Surgery Scheduler

## 2023-12-07 ENCOUNTER — OFFICE VISIT (OUTPATIENT)
Dept: OBGYN | Facility: CLINIC | Age: 58
End: 2023-12-07
Payer: COMMERCIAL

## 2023-12-07 VITALS
DIASTOLIC BLOOD PRESSURE: 79 MMHG | BODY MASS INDEX: 24.55 KG/M2 | TEMPERATURE: 97.3 F | HEART RATE: 79 BPM | SYSTOLIC BLOOD PRESSURE: 139 MMHG | WEIGHT: 126.7 LBS

## 2023-12-07 DIAGNOSIS — N95.0 POST-MENOPAUSAL BLEEDING: ICD-10-CM

## 2023-12-07 DIAGNOSIS — Z01.818 PRE-OP EXAM: Primary | ICD-10-CM

## 2023-12-07 PROCEDURE — 99213 OFFICE O/P EST LOW 20 MIN: CPT | Performed by: OBSTETRICS & GYNECOLOGY

## 2023-12-07 NOTE — PROGRESS NOTES
CC:  preop   HPI: Augustus Mcgrath is a 58 year old female    Patient's last menstrual period was 04/15/2020 (approximate).      Prior clinic notes from 23 are reviewed at today's visit.       Plan for hysteroscopy with morcellation due to POST-MENOPAUSAL BLEEDING.  Family history notable for history of uterine/endometrial cancer.    Since last visit she did have surgery to remove two parathyroid glands.  Surgery went well.    Past Medical History:   Diagnosis Date    ASCUS of cervix with negative high risk HPV 2018    3/1/18 ASCUS, Neg HPV    Complication of anesthesia     ponv    Contraception      has vasectomy    Eye problems     right eye, retinal detachment, LEFT EY PROBLEMATIC AS WELL    Hyperparathyroidism (H24)     Hypertension     Iron deficiency anemia due to chronic blood loss 2016    MGUS (monoclonal gammopathy of unknown significance)     Osteoporosis        Past Surgical History:   Procedure Laterality Date    OPERATIVE HYSTEROSCOPY WITH MORCELLATOR N/A 6/15/2020    Procedure: Hysteroscopy with morcellator;  Surgeon: Wendy Hernandez MD;  Location: [facility] OR    retinal surgery      scleral buckle and several lasers both eyes    VITRECTOMY ANTERIOR  13    [facility]       Family History   Problem Relation Age of Onset    Hypertension Mother     Heart Disease Mother 85    Heart Failure Mother          of renal failure related to cardiac testing    Lipids Father     Heart Disease Father 85         suddenly while doing yardwork    Hyperlipidemia Father     Colon Cancer Father     Breast Cancer Sister 52    Uterine Cancer Sister         negative genetic testing.  No chemo or radiation    Hypertension Sister 52    Thyroid Disease Sister     Uterine Cancer Sister         had recurrence, failed initial treatment for this.  Now doing chemo and radiation ()    Cerebrovascular Disease Maternal Grandfather     Cerebrovascular Disease  Paternal Grandfather      Current Outpatient Medications   Medication Sig Dispense Refill    conjugated estrogens (PREMARIN) 0.625 MG/GM vaginal cream Place 1 g vaginally three times a week 30 g 3    Multiple Vitamins-Minerals (PRESERVISION AREDS PO) Preservision      ondansetron (ZOFRAN-ODT) 4 MG ODT tab Take 1 tablet (4 mg) by mouth every 8 hours as needed for nausea 12 tablet 1    SUMAtriptan (IMITREX) 50 MG tablet Take 1 tablet (50 mg) by mouth at onset of headache for migraine May repeat every  2 hours until symptoms resolved. Max 4 tablets/24 hours. 30 tablet 1    triamterene-HCTZ (DYAZIDE) 37.5-25 MG capsule TAKE ONE CAPSULE BY MOUTH EVERY MORNING 30 capsule 11       Allergies: Lisinopril    ROS:  C: NEGATIVE for fever, chills, change in weight  I: NEGATIVE for worrisome rashes, moles or lesions  E: NEGATIVE for vision changes or irritation  E/M: NEGATIVE for ear, mouth and throat problems  R: NEGATIVE for significant cough or SOB  CV: NEGATIVE for chest pain, palpitations or peripheral edema  GI: NEGATIVE for nausea, heartburn, or change in bowel habits  : NEGATIVE for frequency, dysuria, hematuria, vaginal discharge  M: NEGATIVE for significant arthralgias or myalgia  N: NEGATIVE for weakness, dizziness or paresthesias  E: NEGATIVE for temperature intolerance, skin/hair changes  P: NEGATIVE for changes in mood or affect    General:  Pleasant, in no acute distress.  CV:  RRR  Respiratory:  CTAB  Ext:  No gross abnormalities.  Neurological:  Normal mental status.  Gait WNL.  Sensation and strength grossly intact  Psych - Appropriate mood and affect.        ASSESSMENT/PLAN:  1. Pre-op exam  2.  Post-menopausal bleeding  Appropriate exam today.  Will hold BP meds morning of surgery  Discussed option of placing Mirena IUD and its role in helping keep the lining of the uterus thin, decreasing risk of endometrial cancer.  She had a bad experience with IUD when she was younger (got pregnant with one) so hesitant,  but will consider it.      Surgery is scheduled for 12/15/23 and will proceed as planned. Routine pre-op labs      Wendy Hernandez MD

## 2023-12-08 DIAGNOSIS — Z01.818 PRE-OP EXAM: Primary | ICD-10-CM

## 2023-12-08 DIAGNOSIS — N95.0 POST-MENOPAUSAL BLEEDING: ICD-10-CM

## 2023-12-11 ENCOUNTER — PREP FOR PROCEDURE (OUTPATIENT)
Dept: OBGYN | Facility: CLINIC | Age: 58
End: 2023-12-11
Payer: COMMERCIAL

## 2023-12-12 LAB
ABO/RH(D): NORMAL
ANTIBODY SCREEN: NEGATIVE
SPECIMEN EXPIRATION DATE: NORMAL

## 2023-12-13 ENCOUNTER — LAB (OUTPATIENT)
Dept: LAB | Facility: CLINIC | Age: 58
End: 2023-12-13
Payer: COMMERCIAL

## 2023-12-13 DIAGNOSIS — N95.0 POST-MENOPAUSAL BLEEDING: ICD-10-CM

## 2023-12-13 DIAGNOSIS — Z01.818 PRE-OP EXAM: ICD-10-CM

## 2023-12-13 LAB
ERYTHROCYTE [DISTWIDTH] IN BLOOD BY AUTOMATED COUNT: 11.8 % (ref 10–15)
HCT VFR BLD AUTO: 44.9 % (ref 35–47)
HGB BLD-MCNC: 15.1 G/DL (ref 11.7–15.7)
MCH RBC QN AUTO: 30.4 PG (ref 26.5–33)
MCHC RBC AUTO-ENTMCNC: 33.6 G/DL (ref 31.5–36.5)
MCV RBC AUTO: 91 FL (ref 78–100)
PLATELET # BLD AUTO: 202 10E3/UL (ref 150–450)
RBC # BLD AUTO: 4.96 10E6/UL (ref 3.8–5.2)
WBC # BLD AUTO: 7.2 10E3/UL (ref 4–11)

## 2023-12-13 PROCEDURE — 86901 BLOOD TYPING SEROLOGIC RH(D): CPT

## 2023-12-13 PROCEDURE — 87635 SARS-COV-2 COVID-19 AMP PRB: CPT

## 2023-12-13 PROCEDURE — 86850 RBC ANTIBODY SCREEN: CPT

## 2023-12-13 PROCEDURE — 36415 COLL VENOUS BLD VENIPUNCTURE: CPT

## 2023-12-13 PROCEDURE — 85027 COMPLETE CBC AUTOMATED: CPT

## 2023-12-13 PROCEDURE — 86900 BLOOD TYPING SEROLOGIC ABO: CPT

## 2023-12-14 LAB — SARS-COV-2 RNA RESP QL NAA+PROBE: NEGATIVE

## 2023-12-15 ENCOUNTER — HOSPITAL ENCOUNTER (OUTPATIENT)
Facility: CLINIC | Age: 58
Discharge: HOME | End: 2023-12-15
Attending: OBSTETRICS & GYNECOLOGY | Admitting: OBSTETRICS & GYNECOLOGY
Payer: COMMERCIAL

## 2023-12-15 ENCOUNTER — ANESTHESIA (OUTPATIENT)
Dept: SURGERY | Facility: CLINIC | Age: 58
End: 2023-12-15
Payer: COMMERCIAL

## 2023-12-15 ENCOUNTER — ANESTHESIA EVENT (OUTPATIENT)
Dept: SURGERY | Facility: CLINIC | Age: 58
End: 2023-12-15
Payer: COMMERCIAL

## 2023-12-15 VITALS
DIASTOLIC BLOOD PRESSURE: 77 MMHG | HEIGHT: 60 IN | OXYGEN SATURATION: 99 % | BODY MASS INDEX: 24.87 KG/M2 | SYSTOLIC BLOOD PRESSURE: 147 MMHG | RESPIRATION RATE: 15 BRPM | WEIGHT: 126.7 LBS | HEART RATE: 68 BPM | TEMPERATURE: 98 F

## 2023-12-15 LAB — HCG UR QL: NEGATIVE

## 2023-12-15 PROCEDURE — 250N000011 HC RX IP 250 OP 636: Performed by: NURSE ANESTHETIST, CERTIFIED REGISTERED

## 2023-12-15 PROCEDURE — 250N000013 HC RX MED GY IP 250 OP 250 PS 637: Performed by: OBSTETRICS & GYNECOLOGY

## 2023-12-15 PROCEDURE — 258N000003 HC RX IP 258 OP 636: Performed by: NURSE ANESTHETIST, CERTIFIED REGISTERED

## 2023-12-15 PROCEDURE — 81025 URINE PREGNANCY TEST: CPT | Performed by: OBSTETRICS & GYNECOLOGY

## 2023-12-15 PROCEDURE — 999N000141 HC STATISTIC PRE-PROCEDURE NURSING ASSESSMENT: Performed by: OBSTETRICS & GYNECOLOGY

## 2023-12-15 PROCEDURE — 88305 TISSUE EXAM BY PATHOLOGIST: CPT | Mod: 26 | Performed by: PATHOLOGY

## 2023-12-15 PROCEDURE — 250N000009 HC RX 250: Performed by: NURSE ANESTHETIST, CERTIFIED REGISTERED

## 2023-12-15 PROCEDURE — 88305 TISSUE EXAM BY PATHOLOGIST: CPT | Mod: TC | Performed by: OBSTETRICS & GYNECOLOGY

## 2023-12-15 PROCEDURE — 272N000001 HC OR GENERAL SUPPLY STERILE: Performed by: OBSTETRICS & GYNECOLOGY

## 2023-12-15 PROCEDURE — 250N000011 HC RX IP 250 OP 636: Performed by: OBSTETRICS & GYNECOLOGY

## 2023-12-15 PROCEDURE — 250N000013 HC RX MED GY IP 250 OP 250 PS 637: Performed by: ANESTHESIOLOGY

## 2023-12-15 PROCEDURE — 370N000017 HC ANESTHESIA TECHNICAL FEE, PER MIN: Performed by: OBSTETRICS & GYNECOLOGY

## 2023-12-15 PROCEDURE — 250N000009 HC RX 250: Performed by: OBSTETRICS & GYNECOLOGY

## 2023-12-15 PROCEDURE — 58558 HYSTEROSCOPY BIOPSY: CPT | Mod: GC | Performed by: OBSTETRICS & GYNECOLOGY

## 2023-12-15 PROCEDURE — 360N000076 HC SURGERY LEVEL 3, PER MIN: Performed by: OBSTETRICS & GYNECOLOGY

## 2023-12-15 PROCEDURE — 58300 INSERT INTRAUTERINE DEVICE: CPT | Mod: 51 | Performed by: OBSTETRICS & GYNECOLOGY

## 2023-12-15 PROCEDURE — 710N000012 HC RECOVERY PHASE 2, PER MINUTE: Performed by: OBSTETRICS & GYNECOLOGY

## 2023-12-15 RX ORDER — FENTANYL CITRATE 50 UG/ML
INJECTION, SOLUTION INTRAMUSCULAR; INTRAVENOUS PRN
Status: DISCONTINUED | OUTPATIENT
Start: 2023-12-15 | End: 2023-12-15

## 2023-12-15 RX ORDER — ONDANSETRON 2 MG/ML
4 INJECTION INTRAMUSCULAR; INTRAVENOUS EVERY 30 MIN PRN
Status: DISCONTINUED | OUTPATIENT
Start: 2023-12-15 | End: 2023-12-15 | Stop reason: HOSPADM

## 2023-12-15 RX ORDER — SODIUM CHLORIDE, SODIUM LACTATE, POTASSIUM CHLORIDE, CALCIUM CHLORIDE 600; 310; 30; 20 MG/100ML; MG/100ML; MG/100ML; MG/100ML
INJECTION, SOLUTION INTRAVENOUS CONTINUOUS PRN
Status: DISCONTINUED | OUTPATIENT
Start: 2023-12-15 | End: 2023-12-15

## 2023-12-15 RX ORDER — PROPOFOL 10 MG/ML
INJECTION, EMULSION INTRAVENOUS CONTINUOUS PRN
Status: DISCONTINUED | OUTPATIENT
Start: 2023-12-15 | End: 2023-12-15

## 2023-12-15 RX ORDER — HYDROMORPHONE HYDROCHLORIDE 1 MG/ML
0.2 INJECTION, SOLUTION INTRAMUSCULAR; INTRAVENOUS; SUBCUTANEOUS EVERY 5 MIN PRN
Status: DISCONTINUED | OUTPATIENT
Start: 2023-12-15 | End: 2023-12-15 | Stop reason: HOSPADM

## 2023-12-15 RX ORDER — ACETAMINOPHEN 325 MG/1
975 TABLET ORAL ONCE
Status: DISCONTINUED | OUTPATIENT
Start: 2023-12-15 | End: 2023-12-15 | Stop reason: HOSPADM

## 2023-12-15 RX ORDER — SODIUM CHLORIDE, SODIUM LACTATE, POTASSIUM CHLORIDE, CALCIUM CHLORIDE 600; 310; 30; 20 MG/100ML; MG/100ML; MG/100ML; MG/100ML
INJECTION, SOLUTION INTRAVENOUS CONTINUOUS
Status: DISCONTINUED | OUTPATIENT
Start: 2023-12-15 | End: 2023-12-15 | Stop reason: HOSPADM

## 2023-12-15 RX ORDER — OXYCODONE HYDROCHLORIDE 5 MG/1
10 TABLET ORAL
Status: DISCONTINUED | OUTPATIENT
Start: 2023-12-15 | End: 2023-12-15 | Stop reason: HOSPADM

## 2023-12-15 RX ORDER — HYDROMORPHONE HYDROCHLORIDE 1 MG/ML
0.4 INJECTION, SOLUTION INTRAMUSCULAR; INTRAVENOUS; SUBCUTANEOUS EVERY 5 MIN PRN
Status: DISCONTINUED | OUTPATIENT
Start: 2023-12-15 | End: 2023-12-15 | Stop reason: HOSPADM

## 2023-12-15 RX ORDER — IBUPROFEN 800 MG/1
800 TABLET, FILM COATED ORAL ONCE
Status: DISCONTINUED | OUTPATIENT
Start: 2023-12-15 | End: 2023-12-15 | Stop reason: HOSPADM

## 2023-12-15 RX ORDER — LIDOCAINE HYDROCHLORIDE 20 MG/ML
INJECTION, SOLUTION INFILTRATION; PERINEURAL PRN
Status: DISCONTINUED | OUTPATIENT
Start: 2023-12-15 | End: 2023-12-15

## 2023-12-15 RX ORDER — FENTANYL CITRATE 50 UG/ML
50 INJECTION, SOLUTION INTRAMUSCULAR; INTRAVENOUS EVERY 5 MIN PRN
Status: DISCONTINUED | OUTPATIENT
Start: 2023-12-15 | End: 2023-12-15 | Stop reason: HOSPADM

## 2023-12-15 RX ORDER — ONDANSETRON 2 MG/ML
INJECTION INTRAMUSCULAR; INTRAVENOUS PRN
Status: DISCONTINUED | OUTPATIENT
Start: 2023-12-15 | End: 2023-12-15

## 2023-12-15 RX ORDER — OXYCODONE HYDROCHLORIDE 5 MG/1
5 TABLET ORAL
Status: COMPLETED | OUTPATIENT
Start: 2023-12-15 | End: 2023-12-15

## 2023-12-15 RX ORDER — PROPOFOL 10 MG/ML
INJECTION, EMULSION INTRAVENOUS PRN
Status: DISCONTINUED | OUTPATIENT
Start: 2023-12-15 | End: 2023-12-15

## 2023-12-15 RX ORDER — LIDOCAINE HYDROCHLORIDE 10 MG/ML
INJECTION, SOLUTION EPIDURAL; INFILTRATION; INTRACAUDAL; PERINEURAL PRN
Status: DISCONTINUED | OUTPATIENT
Start: 2023-12-15 | End: 2023-12-15 | Stop reason: HOSPADM

## 2023-12-15 RX ORDER — DEXAMETHASONE SODIUM PHOSPHATE 4 MG/ML
INJECTION, SOLUTION INTRA-ARTICULAR; INTRALESIONAL; INTRAMUSCULAR; INTRAVENOUS; SOFT TISSUE PRN
Status: DISCONTINUED | OUTPATIENT
Start: 2023-12-15 | End: 2023-12-15

## 2023-12-15 RX ORDER — ONDANSETRON 4 MG/1
4 TABLET, ORALLY DISINTEGRATING ORAL EVERY 30 MIN PRN
Status: DISCONTINUED | OUTPATIENT
Start: 2023-12-15 | End: 2023-12-15 | Stop reason: HOSPADM

## 2023-12-15 RX ORDER — FENTANYL CITRATE 50 UG/ML
25 INJECTION, SOLUTION INTRAMUSCULAR; INTRAVENOUS EVERY 5 MIN PRN
Status: DISCONTINUED | OUTPATIENT
Start: 2023-12-15 | End: 2023-12-15 | Stop reason: HOSPADM

## 2023-12-15 RX ORDER — KETOROLAC TROMETHAMINE 30 MG/ML
INJECTION, SOLUTION INTRAMUSCULAR; INTRAVENOUS PRN
Status: DISCONTINUED | OUTPATIENT
Start: 2023-12-15 | End: 2023-12-15

## 2023-12-15 RX ORDER — ACETAMINOPHEN 325 MG/1
975 TABLET ORAL ONCE
Status: COMPLETED | OUTPATIENT
Start: 2023-12-15 | End: 2023-12-15

## 2023-12-15 RX ADMIN — ACETAMINOPHEN 975 MG: 325 TABLET, FILM COATED ORAL at 14:06

## 2023-12-15 RX ADMIN — DEXAMETHASONE SODIUM PHOSPHATE 4 MG: 4 INJECTION, SOLUTION INTRA-ARTICULAR; INTRALESIONAL; INTRAMUSCULAR; INTRAVENOUS; SOFT TISSUE at 15:34

## 2023-12-15 RX ADMIN — FENTANYL CITRATE 25 MCG: 50 INJECTION INTRAMUSCULAR; INTRAVENOUS at 15:33

## 2023-12-15 RX ADMIN — LIDOCAINE HYDROCHLORIDE 50 MG: 20 INJECTION, SOLUTION INFILTRATION; PERINEURAL at 15:33

## 2023-12-15 RX ADMIN — PROPOFOL 50 MG: 10 INJECTION, EMULSION INTRAVENOUS at 15:33

## 2023-12-15 RX ADMIN — OXYCODONE HYDROCHLORIDE 5 MG: 5 TABLET ORAL at 17:03

## 2023-12-15 RX ADMIN — FENTANYL CITRATE 25 MCG: 50 INJECTION INTRAMUSCULAR; INTRAVENOUS at 15:54

## 2023-12-15 RX ADMIN — ONDANSETRON 4 MG: 2 INJECTION INTRAMUSCULAR; INTRAVENOUS at 15:33

## 2023-12-15 RX ADMIN — MIDAZOLAM 2 MG: 1 INJECTION INTRAMUSCULAR; INTRAVENOUS at 15:27

## 2023-12-15 RX ADMIN — KETOROLAC TROMETHAMINE 30 MG: 30 INJECTION, SOLUTION INTRAMUSCULAR at 15:54

## 2023-12-15 RX ADMIN — PROPOFOL 100 MCG/KG/MIN: 10 INJECTION, EMULSION INTRAVENOUS at 15:33

## 2023-12-15 RX ADMIN — SODIUM CHLORIDE, POTASSIUM CHLORIDE, SODIUM LACTATE AND CALCIUM CHLORIDE: 600; 310; 30; 20 INJECTION, SOLUTION INTRAVENOUS at 15:27

## 2023-12-15 ASSESSMENT — ACTIVITIES OF DAILY LIVING (ADL)
ADLS_ACUITY_SCORE: 35
ADLS_ACUITY_SCORE: 35

## 2023-12-15 NOTE — OP NOTE
[facility]   Operative Note    Date of service: 12/15/2023    Preoperative diagnosis:   postmenopausal bleeding      Family history of endometrial cancer  Postoperative diagnosis:  Same    Procedure:  EUA, hysteroscopy with Myosure, dilation and curettage, mirena IUD placement    Surgeon:  Wendy Hernandez MD  Assistant: Leonid Tabor MD PGY4    Anesthesia:  MAc  EBL:  2mL  IVF:  500mL  UOP:  none  Fluid deficit:  95mL    Specimens: Endometrial curettings    Complications: none apparent    Findings:    Exam under anesthesia revealed normal cervix, mid position uterus, no adnexal masses.   Speculum exam revealed normal cervix with no lesions.   Hysteroscopy revealed no polyps or fibroids, otherwise normal uterine cavity, and normal tubal ostia visualized bilaterally. Small amount of tissue able to be obtained   Mirena IUD lot #[id number]      Indications:  Augustus Mcgrath is a 58 year old  who presented to clinic with postmenopausal bleeding. Sampling was recommended, and she agreed to proceed with hysteroscopy. Mirena IUD was recommended for endometrial protection.  Risks, benefits, and alternatives to the procedure were discussed with the patient who elected to proceed.  All questions were answered and an informed consent was obtained.    Procedure:  The patient was taken to the operating room where she underwent MAC anesthesia without difficulty.  She was placed in a dorsal lithotomy position using yellow-fin stirrups.  The patient was examined for the above noted findings and then prepped and draped in the usual sterile fashion.  A medium graves speculum was inserted into the vagina. 1cc 1% plain lidocaine was injected into the cervix at 12 o'clock position. A tenaculum was placed on the anterior cervical lip.  A paracervical block was administered with an additional 19cc 1% plain lidocaine at the 4 and 8 o'clock positions. The endocervical canal was serially dilated to 6.5 mm using Hegar dilators. The hysteroscope  was inserted without difficulty with the above findings noted.  The Myosure was used to obtain a sample of the endometrium from throughout the cavity. The hysteroscope was removed. The uterus was sounded to 7cm. The mirena IUD was then placed in the usual fashion at the fundus. All instruments were then removed. The tenaculum was removed from the cervix and the puncture sites noted to be hemostatic with pressure.  The patient was repositioned to the supine position.  The patient tolerated the procedure well and was taken to the recovery room in stable condition.  Dr. Hernandez was scrubbed and present for the entire procedure.      Leonid Tabor MD  OB/GYN PGY-4  12/15/2023 3:57 PM

## 2023-12-15 NOTE — ANESTHESIA PREPROCEDURE EVALUATION
Anesthesia Pre-Procedure Evaluation    Patient: Augustus Mcgrath   MRN: [id number] : 1965        Procedure : Procedure(s):  Hysteroscopy with morcellation  Insert intrauterine device Mirena          Past Medical History:   Diagnosis Date     ASCUS of cervix with negative high risk HPV 2018    3/1/18 ASCUS, Neg HPV     Complication of anesthesia     ponv     Contraception      has vasectomy     Eye problems     right eye, retinal detachment, LEFT EY PROBLEMATIC AS WELL     Hyperparathyroidism (H24)      Hypertension      Iron deficiency anemia due to chronic blood loss 2016     MGUS (monoclonal gammopathy of unknown significance)      Osteoporosis       Past Surgical History:   Procedure Laterality Date     OPERATIVE HYSTEROSCOPY WITH MORCELLATOR N/A 6/15/2020    Procedure: Hysteroscopy with morcellator;  Surgeon: Wendy Hernandez MD;  Location: [facility] OR     retinal surgery      scleral buckle and several lasers both eyes     VITRECTOMY ANTERIOR  13    [facility]      Allergies   Allergen Reactions     Lisinopril Other (See Comments)     Feels horrible      Social History     Tobacco Use     Smoking status: Former     Packs/day: 0.50     Years: 17.00     Additional pack years: 0.00     Total pack years: 8.50     Types: Cigarettes     Quit date: 11/10/2010     Years since quittin.1     Smokeless tobacco: Never     Tobacco comments:     occ   Substance Use Topics     Alcohol use: Yes     Comment: 2 drinks a week       Wt Readings from Last 1 Encounters:   12/15/23 57.5 kg (126 lb 11.2 oz)        Anesthesia Evaluation            ROS/MED HX  ENT/Pulmonary:       Neurologic:     (+)      migraines,                          Cardiovascular:     (+)  hypertension- -   -  - -                                      METS/Exercise Tolerance:     Hematologic:       Musculoskeletal:       GI/Hepatic:       Renal/Genitourinary:       Endo:       Psychiatric/Substance Use:  "      Infectious Disease:       Malignancy:       Other:          Physical Exam    Airway        Mallampati: II   TM distance: > 3 FB   Neck ROM: full   Mouth opening: > 3 cm    Respiratory Devices and Support         Dental       (+) Minor Abnormalities - some fillings, tiny chips      Cardiovascular   cardiovascular exam normal       Rhythm and rate: regular and normal     Pulmonary   pulmonary exam normal        breath sounds clear to auscultation       OUTSIDE LABS:  CBC:   Lab Results   Component Value Date    WBC 7.2 12/13/2023    WBC 5.2 05/26/2022    HGB 15.1 12/13/2023    HGB 15.9 (H) 05/26/2022    HCT 44.9 12/13/2023    HCT 48.9 (H) 05/26/2022     12/13/2023     05/26/2022     BMP:   Lab Results   Component Value Date     10/24/2023     05/26/2022    POTASSIUM 3.6 10/24/2023    POTASSIUM 3.6 05/26/2022    CHLORIDE 100 10/24/2023    CHLORIDE 106 05/26/2022    CO2 30 (H) 10/24/2023    CO2 32 05/26/2022    BUN 20.2 (H) 10/24/2023    BUN 15 05/26/2022    CR 0.82 10/24/2023    CR 0.73 05/26/2022    GLC 92 10/24/2023    GLC 87 05/26/2022     COAGS: No results found for: \"PTT\", \"INR\", \"FIBR\"  POC:   Lab Results   Component Value Date    HCG Negative 06/15/2020     HEPATIC:   Lab Results   Component Value Date    ALBUMIN 3.6 09/21/2020    PROTTOTAL 7.9 09/21/2020    ALT 29 09/21/2020    AST 18 09/21/2020    ALKPHOS 91 09/21/2020    BILITOTAL 0.3 09/21/2020     OTHER:   Lab Results   Component Value Date    PHILLIP 11.1 (H) 10/24/2023    LIPASE 187 09/21/2020    TSH 1.01 05/26/2022       Anesthesia Plan    ASA Status:  2    NPO Status:  NPO Appropriate    Anesthesia Type: MAC.     - Reason for MAC: immobility needed, straight local not clinically adequate   Induction: Intravenous, Propofol.   Maintenance: TIVA.        Consents    Anesthesia Plan(s) and associated risks, benefits, and realistic alternatives discussed. Questions answered and patient/representative(s) expressed understanding.     " - Discussed: Risks, Benefits and Alternatives for BOTH SEDATION and the PROCEDURE were discussed     - Discussed with:  Patient      - Extended Intubation/Ventilatory Support Discussed: No.      - Patient is DNR/DNI Status: No     Use of blood products discussed: No .     Postoperative Care    Pain management: IV analgesics, Oral pain medications.   PONV prophylaxis: Ondansetron (or other 5HT-3), Dexamethasone or Solumedrol, Background Propofol Infusion     Comments:             Yosi Lockhart MD    I have reviewed the pertinent notes and labs in the chart from the past 30 days and (re)examined the patient.  Any updates or changes from those notes are reflected in this note.

## 2023-12-15 NOTE — ANESTHESIA CARE TRANSFER NOTE
Patient: Augustus Mcgrath    Procedure: Procedure(s):  Hysteroscopy with morcellation  Insert intrauterine device Mirena       Diagnosis: Post-menopausal bleeding [N95.0]  Diagnosis Additional Information: No value filed.    Anesthesia Type:   MAC     Note:    Oropharynx: oropharynx clear of all foreign objects and spontaneously breathing  Level of Consciousness: awake  Oxygen Supplementation: room air    Independent Airway: airway patency satisfactory and stable  Dentition: dentition unchanged  Vital Signs Stable: post-procedure vital signs reviewed and stable  Report to RN Given: handoff report given  Patient transferred to: Phase II    Handoff Report: Identifed the Patient, Identified the Reponsible Provider, Reviewed the pertinent medical history, Discussed the surgical course, Reviewed Intra-OP anesthesia mangement and issues during anesthesia, Set expectations for post-procedure period and Allowed opportunity for questions and acknowledgement of understanding  Vitals:  Vitals Value Taken Time   /72 12/15/23 1601   Temp     Pulse 59 12/15/23 1601   Resp     SpO2 97 % 12/15/23 1605   Vitals shown include unfiled device data.    Electronically Signed By: ROSANGELA Willson CRNA  December 15, 2023  4:06 PM

## 2023-12-15 NOTE — DISCHARGE INSTRUCTIONS
Same-Day Surgery   Adult Discharge Orders & Instructions     For 24 hours after surgery:  Get plenty of rest.  A responsible adult must stay with you for at least 24 hours after you leave the hospital.   Pain medication can slow your reflexes. Do not drive or use heavy equipment.  If you have weakness or tingling, don't drive or use heavy equipment until this feeling goes away.  Mixing alcohol and pain medication can cause dizziness and slow your breathing. It can even be fatal. Do not drink alcohol while taking pain medication.  Avoid strenuous or risky activities.  Ask for help when climbing stairs.   You may feel lightheaded.  If so, sit for a few minutes before standing.  Have someone help you get up.   If you have nausea (feel sick to your stomach), drink only clear liquids such as apple juice, ginger ale, broth or 7-Up.  Rest may also help.  Be sure to drink enough fluids.  Move to a regular diet as you feel able. Take pain medications with a small amount of solid food, such as toast or crackers, to avoid nausea.   A slight fever is normal. Call the doctor if your fever is over 100 F (37.7 C) (taken under the tongue) or lasts longer than 24 hours.  You may have a dry mouth, muscle aches, trouble sleeping or a sore throat.  These symptoms should go away after 24 hours.  Do not make important or legal decisions.   Pain Management:      1. Take pain medication (if prescribed) for pain as directed by your physician.        2. WARNING: If the pain medication you have been prescribed contains Tylenol(acetaminophen), DO NOT take additional doses of Tylenol (acetaminophen).     Call your doctor for any of the followin.  Signs of infection (fever, growing tenderness at the surgery site, severe pain, a large amount of drainage or bleeding, foul-smelling drainage, redness, swelling).    2.  It has been over 8 to 10 hours since surgery and you are still not able to urinate (pee).    3.  Headache for over 24  hours.    4.  Numbness, tingling or weakness the day after surgery (if you had spinal anesthesia).  To contact a doctor, call  or:  '   [phone] and ask for the Resident On Call for:         OB/GYN  (answered 24 hours a day)  '   Emergency Department:  [facility]: [phone]  [facility]: [phone]               Rev. 10/2014

## 2023-12-19 LAB
PATH REPORT.COMMENTS IMP SPEC: NORMAL
PATH REPORT.COMMENTS IMP SPEC: NORMAL
PATH REPORT.FINAL DX SPEC: NORMAL
PATH REPORT.GROSS SPEC: NORMAL
PATH REPORT.MICROSCOPIC SPEC OTHER STN: NORMAL
PATH REPORT.RELEVANT HX SPEC: NORMAL
PHOTO IMAGE: NORMAL

## 2024-01-09 ENCOUNTER — OFFICE VISIT (OUTPATIENT)
Dept: OBGYN | Facility: CLINIC | Age: 59
End: 2024-01-09
Payer: COMMERCIAL

## 2024-01-09 ENCOUNTER — TELEPHONE (OUTPATIENT)
Dept: FAMILY MEDICINE | Facility: CLINIC | Age: 59
End: 2024-01-09

## 2024-01-09 VITALS
HEART RATE: 59 BPM | SYSTOLIC BLOOD PRESSURE: 155 MMHG | WEIGHT: 127 LBS | DIASTOLIC BLOOD PRESSURE: 75 MMHG | BODY MASS INDEX: 24.61 KG/M2 | OXYGEN SATURATION: 100 %

## 2024-01-09 DIAGNOSIS — Z09 POSTOP CHECK: Primary | ICD-10-CM

## 2024-01-09 DIAGNOSIS — N95.1 MENOPAUSAL SYNDROME (HOT FLASHES): ICD-10-CM

## 2024-01-09 DIAGNOSIS — G43.109 MIGRAINE WITH AURA AND WITHOUT STATUS MIGRAINOSUS, NOT INTRACTABLE: ICD-10-CM

## 2024-01-09 DIAGNOSIS — I10 HYPERTENSION, GOAL BELOW 140/90: ICD-10-CM

## 2024-01-09 DIAGNOSIS — Z30.431 IUD CHECK UP: ICD-10-CM

## 2024-01-09 DIAGNOSIS — E87.6 HYPOKALEMIA: ICD-10-CM

## 2024-01-09 PROCEDURE — 99213 OFFICE O/P EST LOW 20 MIN: CPT | Performed by: OBSTETRICS & GYNECOLOGY

## 2024-01-09 RX ORDER — TRIAMTERENE AND HYDROCHLOROTHIAZIDE 37.5; 25 MG/1; MG/1
CAPSULE ORAL
Qty: 30 CAPSULE | Refills: 1 | Status: SHIPPED | OUTPATIENT
Start: 2024-01-09 | End: 2024-03-01

## 2024-01-09 NOTE — PATIENT INSTRUCTIONS
Let's see how things go with the bleeding/cramping.  If it continues, get the ultrasound in about a month.  If things resolve, cancel it.    Consider Veozah for hot flashes/night sweats.  If you decide to start, we should get liver tests at the start and in 3 months.  If insurance doesn't cover, check out the savings program from the company that makes it.

## 2024-01-09 NOTE — PROGRESS NOTES
CC:  Post-operative visit    HPI:  Augustus Mcgrath is a 58 year old  who presents 4 weeks status post hysteroscopy with morcellation, Mirena IUD insertion on 12/15/23 for recurrence of post-menopausal bleeding, family history of endometrial cancer.   Since her surgery patient reports the following concerns:  Not sure she likes IUD.  Has had some cramping and irregular spotting.  Was hoping the IUD was going to make things better, but not seeming like it is at this point.     Also suffering from hot flashes, night sweats.  Making it difficult to sleep.  Considering if hormone replacement therapy may be helpful.  Has also heard about other potential benefits of HRT including bone health and cardiovascular health.    Reviewed PMH, PSH, Meds and allergies.    Objective:  Vitals:    24 1458   BP: (!) 155/75   Pulse: 59   SpO2: 100%   Weight: 57.6 kg (127 lb)     Body mass index is 24.61 kg/m .    General:  Pleasant, in no acute distress.  CV:  Normal pulse.  No cyanosis.  Respiratory:  Breathing comfortably.  Ext:  No gross abnormalities.  Neurological:  Normal mental status.  Gait WNL.  Sensation and strength grossly intact  Psych - Appropriate mood and affect.      Assessment:  Satisfactory post-operative course.    Plan:   1. Postop check  No immediate post-op complications evident, but IUD may not be a good option for her in the long run.  Suggested waiting it out a bit, if tolerable.  Things may improve with time.  However, happy to remove at any point if she prefers that.    2. IUD check up  Can consider checking position of IUD to confirm malposition isn't the problem.  US order placed and she'll call if she wants to schedule.  - US Transvaginal Pelvic Non-OB; Future    3. Menopausal syndrome (hot flashes)  4. Migraine with aura and without status migrainosus, not intractable  5. Hypertension, goal below 140/90  Discussed that history of migraines with aura and hypertension are contraindications to  estrogen at this point.  There are other non-hormonal treatment options for hot flashes that could be considered.  A newer treatment on the market, that has proven very effective and well tolerated at this point is Veozah.  Risk of liver injury, so liver tests check at initiation and after 3 months.  Uncertain if she wants to try it, especially since its newer on the market, but may be interested.  Discussed insurance coverage can be a problem, so will prescribe now so that an be evaluated.  Aware of when to get labs done if she decides to start it.  - Fezolinetant 45 MG TABS; Take 45 mg by mouth daily  Dispense: 30 tablet; Refill: 11  - Hepatic panel (Albumin, ALT, AST, Bili, Alk Phos, TP); Standing      Return to clinic for annual exam and as needed.    Wendy Hernandez MD

## 2024-01-15 NOTE — ANESTHESIA POSTPROCEDURE EVALUATION
Patient: Augustus Mcgrath    Procedure: Procedure(s):  Hysteroscopy with morcellation  Insert intrauterine device Mirena       Anesthesia Type:  MAC    Note:  Disposition: Outpatient   Postop Pain Control: Uneventful            Sign Out: Well controlled pain   PONV: No   Neuro/Psych: Uneventful            Sign Out: Acceptable/Baseline neuro status   Airway/Respiratory: Uneventful            Sign Out: Acceptable/Baseline resp. status   CV/Hemodynamics: Uneventful            Sign Out: Acceptable CV status; No obvious hypovolemia; No obvious fluid overload   Other NRE: NONE   DID A NON-ROUTINE EVENT OCCUR? No       Last vitals:  Vitals Value Taken Time   /75 01/09/24 1458   Temp 36.7  C (98  F) 12/15/23 1640   Pulse 59 01/09/24 1458   Resp 15 12/15/23 1700   SpO2 100 % 01/09/24 1458       Electronically Signed By: Yosi Lockhart MD  January 15, 2024  7:37 AM

## 2024-01-16 NOTE — TELEPHONE ENCOUNTER
PRIOR AUTHORIZATION DENIED    Medication: VEOZAH 45 MG PO TABS    Insurance Company: [employer] - Phone [phone] Fax [phone]    Denial Date: 1/15/2024    Denial Reason(s): Excluded

## 2024-01-17 NOTE — TELEPHONE ENCOUNTER
Per insurance rep medication is excluded by plan and they do not offer a prior authorization or an appeal process for the medication.

## 2024-01-17 NOTE — TELEPHONE ENCOUNTER
Reviewed the list of alternatives to this medication as noted in the denial, but there is nothing in this class/for this particular indication.    Patient is suffering from vasomotor symptoms of menopause, for which systemic estrogen therapy would be indicated.  However, she has both hypertension and a history of migraines with aura, both of which are contraindications to systemic estrogen.  Veozah is a safe and approved alternative in this scenario, so request review and reconsideration of denial of coverage.    Wendy Hernandez MD

## 2024-02-19 ENCOUNTER — VIRTUAL VISIT (OUTPATIENT)
Dept: URGENT CARE | Facility: CLINIC | Age: 59
End: 2024-02-19
Payer: COMMERCIAL

## 2024-02-19 DIAGNOSIS — N39.0 ACUTE UTI (URINARY TRACT INFECTION): Primary | ICD-10-CM

## 2024-02-19 PROCEDURE — 99213 OFFICE O/P EST LOW 20 MIN: CPT | Mod: 95

## 2024-02-19 RX ORDER — SULFAMETHOXAZOLE/TRIMETHOPRIM 800-160 MG
1 TABLET ORAL 2 TIMES DAILY
Qty: 10 TABLET | Refills: 0 | Status: SHIPPED | OUTPATIENT
Start: 2024-02-19 | End: 2024-02-24

## 2024-02-19 NOTE — PROGRESS NOTES
Augustus is a 58 year old who is being evaluated via a billable video visit.      How would you like to obtain your AVS?   If the video visit is dropped, the invitation should be resent by:   Will anyone else be joining your video visit? No          Assessment & Plan     Acute UTI (urinary tract infection)    - sulfamethoxazole-trimethoprim (BACTRIM DS) 800-160 MG tablet; Take 1 tablet by mouth 2 times daily for 5 days      Recheck in 3 days if not improving    Subjective   Augustus is a 58 year old, presenting for the following health issues:  No chief complaint on file.    HPI     Genitourinary - Female  Onset/Duration: yesterday  Description:   Painful urination (Dysuria): YES           Frequency: YES  Blood in urine (Hematuria): No  Delay in urine (Hesitency): YES  Intensity: moderate  Progression of Symptoms:  same  Accompanying Signs & Symptoms:  Fever/chills: No  Flank pain: No  Nausea and vomiting: No  Vaginal symptoms: none  Abdominal/Pelvic Pain: No  History:   History of frequent UTI s: No  History of kidney stones: No  Possibility of pregnancy: No  Precipitating or alleviating factors: None  Therapies tried and outcome:  none        Review of Systems  Constitutional, HEENT, cardiovascular, pulmonary, gi and gu systems are negative, except as otherwise noted.      Objective           Vitals:  No vitals were obtained today due to virtual visit.    Physical Exam   GENERAL: alert and no distress  RESP: No audible wheeze, cough, or visible cyanosis.    PSYCH: Appropriate affect, tone, and pace of words      Video-Visit Details    Type of service:  Video Visit     Originating Location (pt. Location): Home    Distant Location (provider location):  Off-site  Platform used for Video Visit: Aaron  Signed Electronically by: [facility]

## 2024-02-29 SDOH — HEALTH STABILITY: PHYSICAL HEALTH: ON AVERAGE, HOW MANY DAYS PER WEEK DO YOU ENGAGE IN MODERATE TO STRENUOUS EXERCISE (LIKE A BRISK WALK)?: 4 DAYS

## 2024-02-29 SDOH — HEALTH STABILITY: PHYSICAL HEALTH: ON AVERAGE, HOW MANY MINUTES DO YOU ENGAGE IN EXERCISE AT THIS LEVEL?: 30 MIN

## 2024-02-29 ASSESSMENT — SOCIAL DETERMINANTS OF HEALTH (SDOH): HOW OFTEN DO YOU GET TOGETHER WITH FRIENDS OR RELATIVES?: ONCE A WEEK

## 2024-03-01 ENCOUNTER — OFFICE VISIT (OUTPATIENT)
Dept: FAMILY MEDICINE | Facility: CLINIC | Age: 59
End: 2024-03-01
Payer: COMMERCIAL

## 2024-03-01 VITALS
DIASTOLIC BLOOD PRESSURE: 76 MMHG | SYSTOLIC BLOOD PRESSURE: 134 MMHG | RESPIRATION RATE: 15 BRPM | HEART RATE: 61 BPM | TEMPERATURE: 99.4 F | OXYGEN SATURATION: 99 % | WEIGHT: 124.6 LBS | BODY MASS INDEX: 23.53 KG/M2 | HEIGHT: 61 IN

## 2024-03-01 DIAGNOSIS — Z12.11 SCREEN FOR COLON CANCER: ICD-10-CM

## 2024-03-01 DIAGNOSIS — E21.3 HYPERPARATHYROIDISM (H): ICD-10-CM

## 2024-03-01 DIAGNOSIS — M80.00XD OSTEOPOROSIS WITH PATHOLOGICAL FRACTURE WITH ROUTINE HEALING, SUBSEQUENT ENCOUNTER: ICD-10-CM

## 2024-03-01 DIAGNOSIS — Z67.41 TYPE O BLOOD, RH NEGATIVE: ICD-10-CM

## 2024-03-01 DIAGNOSIS — Z00.00 ROUTINE GENERAL MEDICAL EXAMINATION AT A HEALTH CARE FACILITY: Primary | ICD-10-CM

## 2024-03-01 DIAGNOSIS — N95.2 POSTMENOPAUSE ATROPHIC VAGINITIS: ICD-10-CM

## 2024-03-01 DIAGNOSIS — I10 HYPERTENSION, GOAL BELOW 140/90: ICD-10-CM

## 2024-03-01 PROBLEM — N93.9 ABNORMAL UTERINE BLEEDING: Status: RESOLVED | Noted: 2020-05-19 | Resolved: 2024-03-01

## 2024-03-01 PROBLEM — M81.0 OSTEOPOROSIS: Status: ACTIVE | Noted: 2024-01-31

## 2024-03-01 PROCEDURE — 90715 TDAP VACCINE 7 YRS/> IM: CPT | Performed by: FAMILY MEDICINE

## 2024-03-01 PROCEDURE — 82570 ASSAY OF URINE CREATININE: CPT | Performed by: FAMILY MEDICINE

## 2024-03-01 PROCEDURE — 90686 IIV4 VACC NO PRSV 0.5 ML IM: CPT | Performed by: FAMILY MEDICINE

## 2024-03-01 PROCEDURE — 82043 UR ALBUMIN QUANTITATIVE: CPT | Performed by: FAMILY MEDICINE

## 2024-03-01 PROCEDURE — 90472 IMMUNIZATION ADMIN EACH ADD: CPT | Performed by: FAMILY MEDICINE

## 2024-03-01 PROCEDURE — 99396 PREV VISIT EST AGE 40-64: CPT | Mod: 25 | Performed by: FAMILY MEDICINE

## 2024-03-01 PROCEDURE — 90471 IMMUNIZATION ADMIN: CPT | Performed by: FAMILY MEDICINE

## 2024-03-01 RX ORDER — ESTRADIOL 0.1 MG/G
2 CREAM VAGINAL
Qty: 42.5 G | Refills: 3 | Status: SHIPPED | OUTPATIENT
Start: 2024-03-04

## 2024-03-01 RX ORDER — TRIAMTERENE AND HYDROCHLOROTHIAZIDE 37.5; 25 MG/1; MG/1
1 CAPSULE ORAL EVERY MORNING
Qty: 90 CAPSULE | Refills: 3 | Status: SHIPPED | OUTPATIENT
Start: 2024-03-01

## 2024-03-01 RX ORDER — CONJUGATED ESTROGENS 0.62 MG/G
1 CREAM VAGINAL
Qty: 38.57 G | Refills: 3 | Status: SHIPPED | OUTPATIENT
Start: 2024-03-04

## 2024-03-01 RX ORDER — AMLODIPINE BESYLATE 5 MG/1
5 TABLET ORAL DAILY
COMMUNITY
Start: 2023-05-26 | End: 2024-03-01

## 2024-03-01 ASSESSMENT — PAIN SCALES - GENERAL: PAINLEVEL: NO PAIN (0)

## 2024-03-01 NOTE — PROGRESS NOTES
Preventive Care Visit  [facility]  Sharlene Baltazar MD, Family Medicine  Mar 1, 2024    Assessment & Plan     (Z00.00) Routine general medical examination at a health care facility  (primary encounter diagnosis)    (I10) Hypertension, goal below 140/90  Comment: At goal  The current medical regimen is effective;  continue present plan and medications.    Plan: Albumin Random Urine Quantitative with Creat         Ratio, triamterene-HCTZ (DYAZIDE) 37.5-25 MG         capsule          (E21.3) Hyperparathyroidism (H24)  Comment: now controlled  Plan: follow up with endocrinologist as scheduled    (M80.00XD) Osteoporosis with pathological fracture with routine healing, subsequent encounter  Comment: continue calcium and vitamin D, has treatment scheduled with endocrinologist (possibly Forteo?)    (N95.2) Postmenopause atrophic vaginitis  Comment: see orders, she can  which ever is most affordable  Plan: conjugated estrogens (PREMARIN) 0.625 MG/GM         vaginal cream, estradiol (ESTRACE) 0.1 MG/GM         vaginal cream          (Z67.41) Type O blood, Rh negative  Comment: noted on prior testing    (Z12.11) Screen for colon cancer  Comment: she elected colonoscopy but will defer given upcoming medical procedures for eye  Plan: contact me for referral when timing is convenient for her    Patient has been advised of split billing requirements and indicates understanding: Yes      Counseling  Appropriate preventive services were discussed with this patient, including applicable screening as appropriate for fall prevention, nutrition, physical activity, Tobacco-use cessation, weight loss and cognition.  Checklist reviewing preventive services available has been given to the patient.  Reviewed patient's diet, addressing concerns and/or questions.     Rico Coffman is a 58 year old, presenting for the following:  Physical  And chronic disease management.  New diagnosis of  hyperparathyroidism this year, s/p parathyroidectomy (#2), diagnosed when she was noted to have osteoporosis during work up for wrist fracture.    Hypertension Follow-up    Do you check your blood pressure regularly outside of the clinic? Yes   Are you following a low salt diet? Yes  Are your blood pressures ever more than 140 on the top number (systolic) OR more   than 90 on the bottom number (diastolic), for example 140/90? No        3/1/2024     8:56 AM   Additional Questions   Roomed by Cheyenne BERGMAN        Health Care Directive  Patient does not have a Health Care Directive or Living Will: Patient states has Advance Directive and will bring in a copy to clinic.    HPI        2/29/2024   General Health   How would you rate your overall physical health? Good   Feel stress (tense, anxious, or unable to sleep) To some extent   (!) STRESS CONCERN      2/29/2024   Nutrition   Three or more servings of calcium each day? Yes   Diet: Low salt   How many servings of fruit and vegetables per day? (!) 2-3   How many sweetened beverages each day? 0-1         2/29/2024   Exercise   Days per week of moderate/strenous exercise 4 days   Average minutes spent exercising at this level 30 min         2/29/2024   Social Factors   Frequency of gathering with friends or relatives Once a week   Worry food won't last until get money to buy more No   Food not last or not have enough money for food? No   Do you have housing?  Yes   Are you worried about losing your housing? No   Lack of transportation? No   Unable to get utilities (heat,electricity)? No         2/29/2024   Fall Risk   Fallen 2 or more times in the past year? No   Trouble with walking or balance? No          2/29/2024   Dental   Dentist two times every year? Yes         2/29/2024   TB Screening   Were you born outside of [place]?  No         Today's PHQ-2 Score:       2/29/2024    10:31 AM   PHQ-2 ( 1999 [employer])   Q1: Little interest or pleasure in doing things 0   Q2: Feeling  down, depressed or hopeless 0   PHQ-2 Score 0   Q1: Little interest or pleasure in doing things Not at all   Q2: Feeling down, depressed or hopeless Not at all   PHQ-2 Score 0           2024   Substance Use   Alcohol more than 3/day or more than 7/wk No   Do you use any other substances recreationally? No     Social History     Tobacco Use    Smoking status: Former     Packs/day: 0.50     Years: 17.00     Additional pack years: 0.00     Total pack years: 8.50     Types: Cigarettes     Quit date: 11/10/2010     Years since quittin.3    Smokeless tobacco: Never    Tobacco comments:     occ   Substance Use Topics    Alcohol use: Yes     Comment: 2 drinks a week     Drug use: No           10/24/2023   LAST FHS-7 RESULTS   1st degree relative breast or ovarian cancer Yes   Any relative bilateral breast cancer No        Mammogram Screening - Mammogram every 1-2 years updated in Health Maintenance based on mutual decision making        2024   STI Screening   New sexual partner(s) since last STI/HIV test? No     History of abnormal Pap smear: see below, follow up with ob as scheduled        Latest Ref Rng & Units 2022     9:01 AM 3/1/2018     9:37 AM 3/1/2018     8:58 AM   PAP / HPV   PAP  Negative for Intraepithelial Lesion or Malignancy (NILM)      PAP (Historical)    ASC-US    HPV 16 DNA Negative Negative  Negative     HPV 18 DNA Negative Negative  Negative     Other HR HPV Negative Negative  Negative       ASCVD Risk   The 10-year ASCVD risk score (Jones PATE, et al., 2019) is: 2.3%    Values used to calculate the score:      Age: 58 years      Sex: Female      Is Non- : No      Diabetic: No      Tobacco smoker: No      Systolic Blood Pressure: 134 mmHg      Is BP treated: No      HDL Cholesterol: 50 mg/dL      Total Cholesterol: 176 mg/dL         Reviewed and updated as needed this visit by Provider                    Past Medical History:   Diagnosis Date    ASCUS of  "cervix with negative high risk HPV 2018    3/1/18 ASCUS, Neg HPV    Complication of anesthesia     ponv    Contraception      has vasectomy    Eye problems     right eye, retinal detachment, LEFT EY PROBLEMATIC AS WELL    Hyperparathyroidism (H24)     Hypertension     Iron deficiency anemia due to chronic blood loss 2016    MGUS (monoclonal gammopathy of unknown significance)     Osteoporosis      Past Surgical History:   Procedure Laterality Date    INSERT INTRAUTERINE DEVICE N/A 12/15/2023    Procedure: Insert intrauterine device Mirena;  Surgeon: Wendy Hernandez MD;  Location: [facility] OR    OPERATIVE HYSTEROSCOPY WITH MORCELLATOR N/A 06/15/2020    Procedure: Hysteroscopy with morcellator;  Surgeon: Wendy Hernandez MD;  Location: [facility] OR    OPERATIVE HYSTEROSCOPY WITH MORCELLATOR N/A 12/15/2023    Procedure: Hysteroscopy with morcellation;  Surgeon: Wendy Hernandez MD;  Location: [facility] OR    PARATHYROIDECTOMY  2023    2 parathyroid glands removed    retinal surgery      scleral buckle and several lasers both eyes    VITRECTOMY ANTERIOR  2013    [facility]     OB History    Para Term  AB Living   1 0 0 0 1 0   SAB IAB Ectopic Multiple Live Births   0 1 0 0 0      # Outcome Date GA Lbr Tono/2nd Weight Sex Delivery Anes PTL Lv   1 IAB                  Review of Systems  Constitutional, HEENT, cardiovascular, pulmonary, gi and gu systems are negative, except as otherwise noted.     Objective    Exam  /76 (BP Location: Right arm, Patient Position: Sitting, Cuff Size: Adult Regular)   Pulse 61   Temp 99.4  F (37.4  C) (Temporal)   Resp 15   Ht 1.537 m (5' 0.5\")   Wt 56.5 kg (124 lb 9.6 oz)   LMP 04/15/2020 (Approximate)   SpO2 99%   BMI 23.93 kg/m     Estimated body mass index is 23.93 kg/m  as calculated from the following:    Height as of this encounter: 1.537 m (5' 0.5\").    Weight as of this encounter: 56.5 kg (124 lb 9.6 " oz).    Physical Exam  GENERAL: alert and no distress  NECK: no adenopathy, no asymmetry, masses, or scars, thyroid normal to palpation, and well healed surgical incision.  RESP: lungs clear to auscultation - no rales, rhonchi or wheezes  CV: regular rate and rhythm, normal S1 S2, no S3 or S4, no murmur, click or rub, no peripheral edema  ABDOMEN: soft, nontender, no hepatosplenomegaly, no masses and bowel sounds normal  MS: no gross musculoskeletal defects noted, no edema  SKIN: no suspicious lesions or rashes  NEURO: Normal strength and tone, mentation intact and speech normal  PSYCH: mentation appears normal, affect normal/bright      Signed Electronically by: Sharlene Baltazar MD

## 2024-03-01 NOTE — PATIENT INSTRUCTIONS
Preventive Care Advice   This is general advice given by our system to help you stay healthy. However, your care team may have specific advice just for you. Please talk to your care team about your preventive care needs.  Nutrition  Eat 5 or more servings of fruits and vegetables each day.  Try wheat bread, brown rice and whole grain pasta (instead of white bread, rice, and pasta).  Get enough calcium and vitamin D. Check the label on foods and aim for 100% of the RDA (recommended daily allowance).  Lifestyle  Exercise at least 150 minutes each week   (30 minutes a day, 5 days a week).  Do muscle strengthening activities 2 days a week. These help control your weight and prevent disease.  No smoking.  Wear sunscreen to prevent skin cancer.  Have a dental exam and cleaning every 6 months.  Yearly exams  See your health care team every year to talk about:  Any changes in your health.  Any medicines your care team has prescribed.  Preventive care, family planning, and ways to prevent chronic diseases.  Shots (vaccines)   HPV shots (up to age 26), if you've never had them before.  Hepatitis B shots (up to age 59), if you've never had them before.  COVID-19 shot: Get this shot when it's due.  Flu shot: Get a flu shot every year.  Tetanus shot: Get a tetanus shot every 10 years.  Pneumococcal, hepatitis A, and RSV shots: Ask your care team if you need these based on your risk.  Shingles shot (for age 50 and up).  General health tests  Diabetes screening:  Starting at age 35, Get screened for diabetes at least every 3 years.  If you are younger than age 35, ask your care team if you should be screened for diabetes.  Cholesterol test: At age 39, start having a cholesterol test every 5 years, or more often if advised.  Bone density scan (DEXA): At age 50, ask your care team if you should have this scan for osteoporosis (brittle bones).  Hepatitis C: Get tested at least once in your life.  STIs (sexually transmitted  infections)  Before age 24: Ask your care team if you should be screened for STIs.  After age 24: Get screened for STIs if you're at risk. You are at risk for STIs (including HIV) if:  You are sexually active with more than one person.  You don't use condoms every time.  You or a partner was diagnosed with a sexually transmitted infection.  If you are at risk for HIV, ask about PrEP medicine to prevent HIV.  Get tested for HIV at least once in your life, whether you are at risk for HIV or not.  Cancer screening tests  Cervical cancer screening: If you have a cervix, begin getting regular cervical cancer screening tests at age 21. Most people who have regular screenings with normal results can stop after age 65. Talk about this with your provider.  Breast cancer scan (mammogram): If you've ever had breasts, begin having regular mammograms starting at age 40. This is a scan to check for breast cancer.  Colon cancer screening: It is important to start screening for colon cancer at age 45.  Have a colonoscopy test every 10 years (or more often if you're at risk) Or, ask your provider about stool tests like a FIT test every year or Cologuard test every 3 years.  To learn more about your testing options, visit: https://www.[employer]/747363.pdf.  For help making a decision, visit: https://bit.ly/sy41198.  Prostate cancer screening test: If you have a prostate and are age 55 to 69, ask your provider if you would benefit from a yearly prostate cancer screening test.  Lung cancer screening: If you are a current or former smoker age 50 to 80, ask your care team if ongoing lung cancer screenings are right for you.  For informational purposes only. Not to replace the advice of your health care provider. Copyright   2023 [employer]. All rights reserved. Clinically reviewed by the [facility] Transitions Program. [employer] 721151 - REV 01/24.    Learning About Stress  What is stress?     Stress is your  body's response to a hard situation. Your body can have a physical, emotional, or mental response. Stress is a fact of life for most people, and it affects everyone differently. What causes stress for you may not be stressful for someone else.  A lot of things can cause stress. You may feel stress when you go on a job interview, take a test, or run a race. This kind of short-term stress is normal and even useful. It can help you if you need to work hard or react quickly. For example, stress can help you finish an important job on time.  Long-term stress is caused by ongoing stressful situations or events. Examples of long-term stress include long-term health problems, ongoing problems at work, or conflicts in your family. Long-term stress can harm your health.  How does stress affect your health?  When you are stressed, your body responds as though you are in danger. It makes hormones that speed up your heart, make you breathe faster, and give you a burst of energy. This is called the fight-or-flight stress response. If the stress is over quickly, your body goes back to normal and no harm is done.  But if stress happens too often or lasts too long, it can have bad effects. Long-term stress can make you more likely to get sick, and it can make symptoms of some diseases worse. If you tense up when you are stressed, you may develop neck, shoulder, or low back pain. Stress is linked to high blood pressure and heart disease.  Stress also harms your emotional health. It can make you gutierrez, tense, or depressed. Your relationships may suffer, and you may not do well at work or school.  What can you do to manage stress?  You can try these things to help manage stress:   Do something active. Exercise or activity can help reduce stress. Walking is a great way to get started. Even everyday activities such as housecleaning or yard work can help.  Try yoga or andrea chi. These techniques combine exercise and meditation. You may need  some training at first to learn them.  Do something you enjoy. For example, listen to music or go to a movie. Practice your hobby or do volunteer work.  Meditate. This can help you relax, because you are not worrying about what happened before or what may happen in the future.  Do guided imagery. Imagine yourself in any setting that helps you feel calm. You can use online videos, books, or a teacher to guide you.  Do breathing exercises. For example:  From a standing position, bend forward from the waist with your knees slightly bent. Let your arms dangle close to the floor.  Breathe in slowly and deeply as you return to a standing position. Roll up slowly and lift your head last.  Hold your breath for just a few seconds in the standing position.  Breathe out slowly and bend forward from the waist.  Let your feelings out. Talk, laugh, cry, and express anger when you need to. Talking with supportive friends or family, a counselor, or a leon leader about your feelings is a healthy way to relieve stress. Avoid discussing your feelings with people who make you feel worse.  Write. It may help to write about things that are bothering you. This helps you find out how much stress you feel and what is causing it. When you know this, you can find better ways to cope.  What can you do to prevent stress?  You might try some of these things to help prevent stress:  Manage your time. This helps you find time to do the things you want and need to do.  Get enough sleep. Your body recovers from the stresses of the day while you are sleeping.  Get support. Your family, friends, and community can make a difference in how you experience stress.  Limit your news feed. Avoid or limit time on social media or news that may make you feel stressed.  Do something active. Exercise or activity can help reduce stress. Walking is a great way to get started.  Where can you learn more?  Go to https://www.healthwise.net/patiented  Enter N032 in the  "search box to learn more about \"Learning About Stress.\"  Current as of: February 26, 2023               Content Version: 13.8    2026-2869 [employer].   Care instructions adapted under license by your healthcare professional. If you have questions about a medical condition or this instruction, always ask your healthcare professional. [employer] disclaims any warranty or liability for your use of this information.      "

## 2024-03-01 NOTE — NURSING NOTE
Prior to immunization administration, verified patients identity using patient s name and date of birth. Please see Immunization Activity for additional information.     Screening Questionnaire for Adult Immunization    Are you sick today?   No   Do you have allergies to medications, food, a vaccine component or latex?   No   Have you ever had a serious reaction after receiving a vaccination?   No   Do you have a long-term health problem with heart, lung, kidney, or metabolic disease (e.g., diabetes), asthma, a blood disorder, no spleen, complement component deficiency, a cochlear implant, or a spinal fluid leak?  Are you on long-term aspirin therapy?   No   Do you have cancer, leukemia, HIV/AIDS, or any other immune system problem?   No   Do you have a parent, brother, or sister with an immune system problem?   No   In the past 3 months, have you taken medications that affect  your immune system, such as prednisone, other steroids, or anticancer drugs; drugs for the treatment of rheumatoid arthritis, Crohn s disease, or psoriasis; or have you had radiation treatments?   No   Have you had a seizure, or a brain or other nervous system problem?   No   During the past year, have you received a transfusion of blood or blood    products, or been given immune (gamma) globulin or antiviral drug?   No   For women: Are you pregnant or is there a chance you could become       pregnant during the next month?   No   Have you received any vaccinations in the past 4 weeks?   No     Immunization questionnaire answers were all negative.      Patient instructed to remain in clinic for 15 minutes afterwards, and to report any adverse reactions.     Screening performed by Iza Caruso MA on 3/1/2024 at 10:02 AM.

## 2024-03-02 LAB
CREAT UR-MCNC: 32.6 MG/DL
MICROALBUMIN UR-MCNC: <12 MG/L
MICROALBUMIN/CREAT UR: NORMAL MG/G{CREAT}

## 2024-03-05 NOTE — RESULT ENCOUNTER NOTE
Thank you for getting your labs done!  Your urine microalbumin protein level is normal, which is good news!    Sincerely,  Dr. Sharlene Baltazar MD  3/5/2024

## 2024-05-03 DIAGNOSIS — Z12.11 COLON CANCER SCREENING: ICD-10-CM

## 2024-05-17 ENCOUNTER — ORDERS ONLY (AUTO-RELEASED) (OUTPATIENT)
Dept: FAMILY MEDICINE | Facility: CLINIC | Age: 59
End: 2024-05-17
Payer: COMMERCIAL

## 2024-05-17 DIAGNOSIS — Z12.11 COLON CANCER SCREENING: ICD-10-CM

## 2024-06-07 ENCOUNTER — ANCILLARY PROCEDURE (OUTPATIENT)
Dept: MAMMOGRAPHY | Facility: CLINIC | Age: 59
End: 2024-06-07
Attending: FAMILY MEDICINE
Payer: COMMERCIAL

## 2024-06-07 DIAGNOSIS — Z12.31 VISIT FOR SCREENING MAMMOGRAM: ICD-10-CM

## 2024-06-07 PROCEDURE — 77063 BREAST TOMOSYNTHESIS BI: CPT | Mod: TC | Performed by: STUDENT IN AN ORGANIZED HEALTH CARE EDUCATION/TRAINING PROGRAM

## 2024-06-07 PROCEDURE — 77067 SCR MAMMO BI INCL CAD: CPT | Mod: TC | Performed by: STUDENT IN AN ORGANIZED HEALTH CARE EDUCATION/TRAINING PROGRAM

## 2025-01-30 ENCOUNTER — PATIENT OUTREACH (OUTPATIENT)
Dept: CARE COORDINATION | Facility: CLINIC | Age: 60
End: 2025-01-30
Payer: COMMERCIAL

## 2025-03-16 ENCOUNTER — MYC REFILL (OUTPATIENT)
Dept: FAMILY MEDICINE | Facility: CLINIC | Age: 60
End: 2025-03-16
Payer: COMMERCIAL

## 2025-03-16 DIAGNOSIS — I10 HYPERTENSION, GOAL BELOW 140/90: ICD-10-CM

## 2025-03-18 RX ORDER — TRIAMTERENE AND HYDROCHLOROTHIAZIDE 37.5; 25 MG/1; MG/1
1 CAPSULE ORAL EVERY MORNING
Qty: 90 CAPSULE | Refills: 3 | Status: SHIPPED | OUTPATIENT
Start: 2025-03-18

## 2025-04-05 SDOH — HEALTH STABILITY: PHYSICAL HEALTH: ON AVERAGE, HOW MANY MINUTES DO YOU ENGAGE IN EXERCISE AT THIS LEVEL?: 20 MIN

## 2025-04-05 SDOH — HEALTH STABILITY: PHYSICAL HEALTH: ON AVERAGE, HOW MANY DAYS PER WEEK DO YOU ENGAGE IN MODERATE TO STRENUOUS EXERCISE (LIKE A BRISK WALK)?: 6 DAYS

## 2025-04-05 ASSESSMENT — SOCIAL DETERMINANTS OF HEALTH (SDOH): HOW OFTEN DO YOU GET TOGETHER WITH FRIENDS OR RELATIVES?: ONCE A WEEK

## 2025-04-10 ENCOUNTER — OFFICE VISIT (OUTPATIENT)
Dept: FAMILY MEDICINE | Facility: CLINIC | Age: 60
End: 2025-04-10
Attending: FAMILY MEDICINE
Payer: COMMERCIAL

## 2025-04-10 VITALS
DIASTOLIC BLOOD PRESSURE: 71 MMHG | BODY MASS INDEX: 23.64 KG/M2 | HEIGHT: 61 IN | RESPIRATION RATE: 16 BRPM | TEMPERATURE: 97 F | OXYGEN SATURATION: 100 % | WEIGHT: 125.2 LBS | HEART RATE: 78 BPM | SYSTOLIC BLOOD PRESSURE: 118 MMHG

## 2025-04-10 DIAGNOSIS — N95.2 POSTMENOPAUSE ATROPHIC VAGINITIS: ICD-10-CM

## 2025-04-10 DIAGNOSIS — L98.9 SKIN LESION: ICD-10-CM

## 2025-04-10 DIAGNOSIS — E21.3 HYPERPARATHYROIDISM: ICD-10-CM

## 2025-04-10 DIAGNOSIS — I10 HYPERTENSION, GOAL BELOW 140/90: ICD-10-CM

## 2025-04-10 DIAGNOSIS — R87.610 ASCUS OF CERVIX WITH NEGATIVE HIGH RISK HPV: ICD-10-CM

## 2025-04-10 DIAGNOSIS — Z00.00 ROUTINE GENERAL MEDICAL EXAMINATION AT A HEALTH CARE FACILITY: Primary | ICD-10-CM

## 2025-04-10 LAB
ANION GAP SERPL CALCULATED.3IONS-SCNC: 11 MMOL/L (ref 7–15)
BUN SERPL-MCNC: 21.4 MG/DL (ref 6–20)
CALCIUM SERPL-MCNC: 9.9 MG/DL (ref 8.8–10.4)
CHLORIDE SERPL-SCNC: 102 MMOL/L (ref 98–107)
CREAT SERPL-MCNC: 0.9 MG/DL (ref 0.51–0.95)
CREAT UR-MCNC: 47.1 MG/DL
EGFRCR SERPLBLD CKD-EPI 2021: 74 ML/MIN/1.73M2
ERYTHROCYTE [DISTWIDTH] IN BLOOD BY AUTOMATED COUNT: 11.9 % (ref 10–15)
GLUCOSE SERPL-MCNC: 91 MG/DL (ref 70–99)
HCO3 SERPL-SCNC: 29 MMOL/L (ref 22–29)
HCT VFR BLD AUTO: 47.1 % (ref 35–47)
HGB BLD-MCNC: 15.7 G/DL (ref 11.7–15.7)
MCH RBC QN AUTO: 30.7 PG (ref 26.5–33)
MCHC RBC AUTO-ENTMCNC: 33.3 G/DL (ref 31.5–36.5)
MCV RBC AUTO: 92 FL (ref 78–100)
MICROALBUMIN UR-MCNC: <12 MG/L
MICROALBUMIN/CREAT UR: NORMAL MG/G{CREAT}
PLATELET # BLD AUTO: 179 10E3/UL (ref 150–450)
POTASSIUM SERPL-SCNC: 3.6 MMOL/L (ref 3.4–5.3)
PTH-INTACT SERPL-MCNC: 29 PG/ML (ref 15–65)
RBC # BLD AUTO: 5.12 10E6/UL (ref 3.8–5.2)
SODIUM SERPL-SCNC: 142 MMOL/L (ref 135–145)
TSH SERPL DL<=0.005 MIU/L-ACNC: 1.22 UIU/ML (ref 0.3–4.2)
WBC # BLD AUTO: 6.9 10E3/UL (ref 4–11)

## 2025-04-10 ASSESSMENT — PAIN SCALES - GENERAL: PAINLEVEL_OUTOF10: NO PAIN (0)

## 2025-04-10 NOTE — RESULT ENCOUNTER NOTE
Hello!  Thank you for getting labs done. Your cbc, or complete blood count, which measures red blood cells (to check for anemia and other vitamin deficiencies) and white blood cells (to check for infection and leukemia) is normal, which is great!  You do not have anemia or an infection.    Your platelets, which reflect liver function and ability to clot, are normal as well.     If you have any questions, please contact the clinic or schedule an appointment with me, thank you!    Sincerely,    REJI TOLEDO MD   4/10/2025

## 2025-04-10 NOTE — PROGRESS NOTES
Preventive Care Visit  [facility]  Sharlene Baltazar MD, Family Medicine  Apr 10, 2025      Assessment & Plan     (Z00.00) Routine general medical examination at a health care facility  (primary encounter diagnosis)    (I10) Hypertension, goal below 140/90  Comment: at goal  Plan: BASIC METABOLIC PANEL, CBC with Platelets,         Albumin Random Urine Quantitative with Creat         Ratio, TSH with free T4 reflex          (N95.2) Postmenopause atrophic vaginitis  Comment: continue topical estrogen  Plan: refill as needed    (R87.610) ASCUS of cervix with negative high risk HPV  Comment: see below, next pap due 2027    (E21.3) Hyperparathyroidism  Comment: previously seen at [facility], will get labs today and refer to specialist for ongoing management.  Plan: Adult Endocrinology  Referral,         Parathyroid Hormone Intact          (L98.9) Skin lesion  Comment: left shoulder lesion  New, previously undiagnosed problem with uncertain prognosis and additional work-up planned.   Refer to specialist for further evaluation and treatment as needed, possible biopsy for tissue diagnosis.  Plan: Adult Dermatology  Referral          Patient has been advised of split billing requirements and indicates understanding: Yes        Counseling  Appropriate preventive services were addressed with this patient via screening, questionnaire, or discussion as appropriate for fall prevention, nutrition, physical activity, Tobacco-use cessation, social engagement, weight loss and cognition.  Checklist reviewing preventive services available has been given to the patient.  Reviewed patient's diet, addressing concerns and/or questions.   She is at risk for psychosocial distress and has been provided with information to reduce risk.       Rico Coffman is a 59 year old, presenting for the following:  Physical        4/10/2025    10:07 AM   Additional Questions   Roomed by  Kaylin ANSARI          hospitals       Hypertension Follow-up    Do you check your blood pressure regularly outside of the clinic? Yes   Are you following a low salt diet? Yes  Are your blood pressures ever more than 140 on the top number (systolic) OR more   than 90 on the bottom number (diastolic), for example 140/90? No    BP Readings from Last 2 Encounters:   04/10/25 118/71   03/01/24 134/76     Hyperparathyroidism Follow-up    S/p parathyroidectomy (#2), last seen 2 years ago by specialist at outside clinic, no recent labs. Not on any medication. Denies any concerning symptoms.    Advance Care Planning  Patient does not have a Health Care Directive: Patient states has Advance Directive and will bring in a copy to clinic.      4/5/2025   General Health   How would you rate your overall physical health? Good   Feel stress (tense, anxious, or unable to sleep) To some extent   (!) STRESS CONCERN      4/5/2025   Nutrition   Three or more servings of calcium each day? Yes   Diet: Regular (no restrictions)   How many servings of fruit and vegetables per day? (!) 2-3   How many sweetened beverages each day? 0-1         4/5/2025   Exercise   Days per week of moderate/strenous exercise 6 days   Average minutes spent exercising at this level 20 min         4/5/2025   Social Factors   Frequency of gathering with friends or relatives Once a week   Worry food won't last until get money to buy more No   Food not last or not have enough money for food? No   Do you have housing? (Housing is defined as stable permanent housing and does not include staying ouside in a car, in a tent, in an abandoned building, in an overnight shelter, or couch-surfing.) Yes   Are you worried about losing your housing? No   Lack of transportation? No   Unable to get utilities (heat,electricity)? No         4/5/2025   Fall Risk   Fallen 2 or more times in the past year? No   Trouble with walking or balance? No          4/5/2025   Dental   Dentist two times  every year? Yes           2024   TB Screening   Were you born outside of the [place]? No           Today's PHQ-2 Score:       2025     9:38 PM   PHQ-2 (  [employer])   Q1: Little interest or pleasure in doing things 0   Q2: Feeling down, depressed or hopeless 0   PHQ-2 Score 0    Q1: Little interest or pleasure in doing things Not at all   Q2: Feeling down, depressed or hopeless Not at all   PHQ-2 Score 0       Patient-reported           2025   Substance Use   Alcohol more than 3/day or more than 7/wk No   Do you use any other substances recreationally? No     Social History     Tobacco Use    Smoking status: Former     Current packs/day: 0.00     Average packs/day: 0.5 packs/day for 17.0 years (8.5 ttl pk-yrs)     Types: Cigarettes     Start date: 11/10/1993     Quit date: 11/10/2010     Years since quittin.4    Smokeless tobacco: Never    Tobacco comments:     occ   Substance Use Topics    Alcohol use: Yes     Alcohol/week: 2.0 standard drinks of alcohol     Types: 2 Standard drinks or equivalent per week     Comment: 2 drinks a week     Drug use: No           2024   LAST FHS-7 RESULTS   1st degree relative breast or ovarian cancer Yes   Any relative bilateral breast cancer No   Any male have breast cancer No   Any ONE woman have BOTH breast AND ovarian cancer No   Any woman with breast cancer before 50yrs No   2 or more relatives with breast AND/OR ovarian cancer No   2 or more relatives with breast AND/OR bowel cancer No        Mammogram Screening - Mammogram every 1-2 years updated in Health Maintenance based on mutual decision making        2025   STI Screening   New sexual partner(s) since last STI/HIV test? No     History of abnormal Pap smear: yes, distant hx with normal pap since, resume q 5 year testing, next due .        Latest Ref Rng & Units 2022     9:01 AM 3/1/2018     9:37 AM 3/1/2018     8:58 AM   PAP / HPV   PAP  Negative for Intraepithelial Lesion or Malignancy  (NILM)      PAP (Historical)    ASC-US    HPV 16 DNA Negative Negative  Negative     HPV 18 DNA Negative Negative  Negative     Other HR HPV Negative Negative  Negative       ASCVD Risk   The 10-year ASCVD risk score (Jones PATE, et al., 2019) is: 2.7%    Values used to calculate the score:      Age: 59 years      Sex: Female      Is Non- : No      Diabetic: No      Tobacco smoker: No      Systolic Blood Pressure: 118 mmHg      Is BP treated: Yes      HDL Cholesterol: 50 mg/dL      Total Cholesterol: 176 mg/dL       Reviewed and updated as needed this visit by Provider    Allergies  Meds  Problems  Med Hx  Surg Hx  Fam Hx            Past Medical History:   Diagnosis Date    ASCUS of cervix with negative high risk HPV 2018    3/1/18 ASCUS, Neg HPV    Complication of anesthesia     ponv    Eye problems     right eye, retinal detachment, LEFT EY PROBLEMATIC AS WELL    Hyperparathyroidism     Hypertension     Iron deficiency anemia due to chronic blood loss 2016    MGUS (monoclonal gammopathy of unknown significance)     Osteoporosis      Past Surgical History:   Procedure Laterality Date    INSERT INTRAUTERINE DEVICE N/A 12/15/2023    Procedure: Insert intrauterine device Mirena;  Surgeon: Wendy Hernandez MD;  Location: [facility]    OPERATIVE HYSTEROSCOPY WITH MORCELLATOR N/A 06/15/2020    Procedure: Hysteroscopy with morcellator;  Surgeon: Wendy Hernandez MD;  Location: [facility] OR    OPERATIVE HYSTEROSCOPY WITH MORCELLATOR N/A 12/15/2023    Procedure: Hysteroscopy with morcellation;  Surgeon: Wendy Hernandez MD;  Location: [facility] OR    PARATHYROIDECTOMY  2023    2 parathyroid glands removed    retinal surgery      scleral buckle and several lasers both eyes    VITRECTOMY ANTERIOR  2013    -[facility]     OB History    Para Term  AB Living   1 0 0 0 1 0   SAB IAB Ectopic Multiple Live Births   0 1 0 0 0      # Outcome  "Date GA Lbr Tono/2nd Weight Sex Type Anes PTL Lv   1 IAB              Lab work is in process      Review of Systems  Constitutional, HEENT, cardiovascular, pulmonary, gi and gu systems are negative, except as otherwise noted.     Objective    Exam  /71 (BP Location: Right arm, Patient Position: Sitting, Cuff Size: Adult Regular)   Pulse 78   Temp 97  F (36.1  C) (Temporal)   Resp 16   Ht 1.549 m (5' 1\")   Wt 56.8 kg (125 lb 3.2 oz)   LMP 04/15/2020 (Approximate)   SpO2 100%   BMI 23.66 kg/m     Estimated body mass index is 23.66 kg/m  as calculated from the following:    Height as of this encounter: 1.549 m (5' 1\").    Weight as of this encounter: 56.8 kg (125 lb 3.2 oz).    Physical Exam  GENERAL: alert and no distress  EYES: Eyes grossly normal to inspection, PERRL and conjunctivae and sclerae normal  HENT: ear canals and TM's normal, nose and mouth without ulcers or lesions  NECK: no adenopathy, no asymmetry, masses, or scars  RESP: lungs clear to auscultation - no rales, rhonchi or wheezes  CV: regular rate and rhythm, normal S1 S2, no S3 or S4, no murmur, click or rub, no peripheral edema  ABDOMEN: soft, nontender, no hepatosplenomegaly, no masses and bowel sounds normal  MS: no gross musculoskeletal defects noted, no edema  SKIN:  left shoulder: irregular erythematous papule with slight blistering appearance of central portion, measuring 5 mm in diameter. Remainder of skin exam is normal.  NEURO: Normal strength and tone, mentation intact and speech normal  PSYCH: mentation appears normal, affect normal/bright        Signed Electronically by: Sharlene Baltazar MD    "

## 2025-04-10 NOTE — PATIENT INSTRUCTIONS
Patient Education   Preventive Care Advice   This is general advice given by our system to help you stay healthy. However, your care team may have specific advice just for you. Please talk to your care team about your preventive care needs.  Nutrition  Eat 5 or more servings of fruits and vegetables each day.  Try wheat bread, brown rice and whole grain pasta (instead of white bread, rice, and pasta).  Get enough calcium and vitamin D. Check the label on foods and aim for 100% of the RDA (recommended daily allowance).  Lifestyle  Exercise at least 150 minutes each week  (30 minutes a day, 5 days a week).  Do muscle strengthening activities 2 days a week. These help control your weight and prevent disease.  No smoking.  Wear sunscreen to prevent skin cancer.  Have a dental exam and cleaning every 6 months.  Yearly exams  See your health care team every year to talk about:  Any changes in your health.  Any medicines your care team has prescribed.  Preventive care, family planning, and ways to prevent chronic diseases.  Shots (vaccines)   HPV shots (up to age 26), if you've never had them before.  Hepatitis B shots (up to age 59), if you've never had them before.  COVID-19 shot: Get this shot when it's due.  Flu shot: Get a flu shot every year.  Tetanus shot: Get a tetanus shot every 10 years.  Pneumococcal, hepatitis A, and RSV shots: Ask your care team if you need these based on your risk.  Shingles shot (for age 50 and up)  General health tests  Diabetes screening:  Starting at age 35, Get screened for diabetes at least every 3 years.  If you are younger than age 35, ask your care team if you should be screened for diabetes.  Cholesterol test: At age 39, start having a cholesterol test every 5 years, or more often if advised.  Bone density scan (DEXA): At age 50, ask your care team if you should have this scan for osteoporosis (brittle bones).  Hepatitis C: Get tested at least once in your life.  STIs (sexually  transmitted infections)  Before age 24: Ask your care team if you should be screened for STIs.  After age 24: Get screened for STIs if you're at risk. You are at risk for STIs (including HIV) if:  You are sexually active with more than one person.  You don't use condoms every time.  You or a partner was diagnosed with a sexually transmitted infection.  If you are at risk for HIV, ask about PrEP medicine to prevent HIV.  Get tested for HIV at least once in your life, whether you are at risk for HIV or not.  Cancer screening tests  Cervical cancer screening: If you have a cervix, begin getting regular cervical cancer screening tests starting at age 21.  Breast cancer scan (mammogram): If you've ever had breasts, begin having regular mammograms starting at age 40. This is a scan to check for breast cancer.  Colon cancer screening: It is important to start screening for colon cancer at age 45.  Have a colonoscopy test every 10 years (or more often if you're at risk) Or, ask your provider about stool tests like a FIT test every year or Cologuard test every 3 years.  To learn more about your testing options, visit:   .  For help making a decision, visit:   https://bit.ly/yv17161.  Prostate cancer screening test: If you have a prostate, ask your care team if a prostate cancer screening test (PSA) at age 55 is right for you.  Lung cancer screening: If you are a current or former smoker ages 50 to 80, ask your care team if ongoing lung cancer screenings are right for you.  For informational purposes only. Not to replace the advice of your health care provider. Copyright   2023 [facility]. All rights reserved. Clinically reviewed by the [facility] Transitions Program. [employer] 664992 - REV 01/24.  Learning About Stress  What is stress?     Stress is your body's response to a hard situation. Your body can have a physical, emotional, or mental response. Stress is a fact of life for most people, and it  affects everyone differently. What causes stress for you may not be stressful for someone else.  A lot of things can cause stress. You may feel stress when you go on a job interview, take a test, or run a race. This kind of short-term stress is normal and even useful. It can help you if you need to work hard or react quickly. For example, stress can help you finish an important job on time.  Long-term stress is caused by ongoing stressful situations or events. Examples of long-term stress include long-term health problems, ongoing problems at work, or conflicts in your family. Long-term stress can harm your health.  How does stress affect your health?  When you are stressed, your body responds as though you are in danger. It makes hormones that speed up your heart, make you breathe faster, and give you a burst of energy. This is called the fight-or-flight stress response. If the stress is over quickly, your body goes back to normal and no harm is done.  But if stress happens too often or lasts too long, it can have bad effects. Long-term stress can make you more likely to get sick, and it can make symptoms of some diseases worse. If you tense up when you are stressed, you may develop neck, shoulder, or low back pain. Stress is linked to high blood pressure and heart disease.  Stress also harms your emotional health. It can make you gutierrez, tense, or depressed. Your relationships may suffer, and you may not do well at work or school.  What can you do to manage stress?  You can try these things to help manage stress:   Do something active. Exercise or activity can help reduce stress. Walking is a great way to get started. Even everyday activities such as housecleaning or yard work can help.  Try yoga or andrea chi. These techniques combine exercise and meditation. You may need some training at first to learn them.  Do something you enjoy. For example, listen to music or go to a movie. Practice your hobby or do volunteer  "work.  Meditate. This can help you relax, because you are not worrying about what happened before or what may happen in the future.  Do guided imagery. Imagine yourself in any setting that helps you feel calm. You can use online videos, books, or a teacher to guide you.  Do breathing exercises. For example:  From a standing position, bend forward from the waist with your knees slightly bent. Let your arms dangle close to the floor.  Breathe in slowly and deeply as you return to a standing position. Roll up slowly and lift your head last.  Hold your breath for just a few seconds in the standing position.  Breathe out slowly and bend forward from the waist.  Let your feelings out. Talk, laugh, cry, and express anger when you need to. Talking with supportive friends or family, a counselor, or a leon leader about your feelings is a healthy way to relieve stress. Avoid discussing your feelings with people who make you feel worse.  Write. It may help to write about things that are bothering you. This helps you find out how much stress you feel and what is causing it. When you know this, you can find better ways to cope.  What can you do to prevent stress?  You might try some of these things to help prevent stress:  Manage your time. This helps you find time to do the things you want and need to do.  Get enough sleep. Your body recovers from the stresses of the day while you are sleeping.  Get support. Your family, friends, and community can make a difference in how you experience stress.  Limit your news feed. Avoid or limit time on social media or news that may make you feel stressed.  Do something active. Exercise or activity can help reduce stress. Walking is a great way to get started.  Where can you learn more?  Go to https://www.[employer].net/patiented  Enter N032 in the search box to learn more about \"Learning About Stress.\"  Current as of: October 24, 2024  Content Version: 14.4 2024-2025 [employer].   Care instructions adapted under license by your healthcare professional. If you have questions about a medical condition or this instruction, always ask your healthcare professional. [employer] disclaims any warranty or liability for your use of this information.

## 2025-04-11 NOTE — RESULT ENCOUNTER NOTE
Thank you very much for getting labs done! Everything looks normal/stable. Good job!    Sincerely,  Dr. Sharlene Baltazar MD  4/10/2025

## 2025-06-16 ENCOUNTER — ANCILLARY PROCEDURE (OUTPATIENT)
Dept: MAMMOGRAPHY | Facility: CLINIC | Age: 60
End: 2025-06-16
Attending: FAMILY MEDICINE
Payer: COMMERCIAL

## 2025-06-16 DIAGNOSIS — Z12.31 VISIT FOR SCREENING MAMMOGRAM: ICD-10-CM

## 2025-06-16 PROCEDURE — 77067 SCR MAMMO BI INCL CAD: CPT | Mod: TC | Performed by: RADIOLOGY

## 2025-06-16 PROCEDURE — 77063 BREAST TOMOSYNTHESIS BI: CPT | Mod: TC | Performed by: RADIOLOGY

## (undated) DEVICE — LINEN GOWN X4 5410

## (undated) DEVICE — TUBING SYS AQUILEX BLUE INFLOW AQL-110 YLW OUTFLOW AQL-111

## (undated) DEVICE — GLOVE BIOGEL PI MICRO INDICATOR UNDERGLOVE SZ 7.0 48970

## (undated) DEVICE — STRAP KNEE/BODY 31143004

## (undated) DEVICE — SEAL SET MYOSURE ROD LENS SCOPE SINGLE USE 40-902

## (undated) DEVICE — SOL NACL 0.9% IRRIG 3000ML BAG 2B7477

## (undated) DEVICE — DEVICE TISSUE REMOVAL HYSTEROSCOPIC MYOSURE LITE 30-401LITE

## (undated) DEVICE — LINEN TOWEL PACK X5 5464

## (undated) DEVICE — GLOVE PROTEXIS BLUE W/NEU-THERA 7.0  2D73EB70

## (undated) DEVICE — ESU HOLDER LAP INST DISP YELLOW SHORT 250MM H-PRO-250

## (undated) DEVICE — PREP DYNA-HEX 4% CHG SCRUB 4OZ BOTTLE MDS098710

## (undated) DEVICE — GLOVE BIOGEL PI ULTRATOUCH G SZ 6.5 42165

## (undated) DEVICE — PAD CHUX UNDERPAD 30X36" P3036C

## (undated) DEVICE — SOL NACL 0.9% IRRIG 1000ML BOTTLE 2F7124

## (undated) DEVICE — Device

## (undated) DEVICE — SPECIMEN BAG BEMIS HI FLOW SUCTION WHITE SOCK 533810

## (undated) DEVICE — SOL WATER IRRIG 1000ML BOTTLE 2F7114

## (undated) DEVICE — KIT PROCEDURE FLUENT IN/OUT FLOWPAK TISS TRAP FLT-112S

## (undated) DEVICE — GLOVE PROTEXIS W/NEU-THERA 6.5  2D73TE65

## (undated) RX ORDER — ACETAMINOPHEN 325 MG/1
TABLET ORAL
Status: DISPENSED
Start: 2023-12-15

## (undated) RX ORDER — PROPOFOL 10 MG/ML
INJECTION, EMULSION INTRAVENOUS
Status: DISPENSED
Start: 2020-06-15

## (undated) RX ORDER — LIDOCAINE HYDROCHLORIDE 10 MG/ML
INJECTION, SOLUTION EPIDURAL; INFILTRATION; INTRACAUDAL; PERINEURAL
Status: DISPENSED
Start: 2020-06-15

## (undated) RX ORDER — FENTANYL CITRATE 50 UG/ML
INJECTION, SOLUTION INTRAMUSCULAR; INTRAVENOUS
Status: DISPENSED
Start: 2020-06-15

## (undated) RX ORDER — LIDOCAINE HYDROCHLORIDE 10 MG/ML
INJECTION, SOLUTION EPIDURAL; INFILTRATION; INTRACAUDAL; PERINEURAL
Status: DISPENSED
Start: 2023-12-15

## (undated) RX ORDER — KETOROLAC TROMETHAMINE 30 MG/ML
INJECTION, SOLUTION INTRAMUSCULAR; INTRAVENOUS
Status: DISPENSED
Start: 2023-12-15

## (undated) RX ORDER — FENTANYL CITRATE 50 UG/ML
INJECTION, SOLUTION INTRAMUSCULAR; INTRAVENOUS
Status: DISPENSED
Start: 2023-12-15

## (undated) RX ORDER — KETOROLAC TROMETHAMINE 30 MG/ML
INJECTION, SOLUTION INTRAMUSCULAR; INTRAVENOUS
Status: DISPENSED
Start: 2020-06-15

## (undated) RX ORDER — OXYCODONE HYDROCHLORIDE 5 MG/1
TABLET ORAL
Status: DISPENSED
Start: 2023-12-15

## (undated) RX ORDER — ONDANSETRON 4 MG/1
TABLET, ORALLY DISINTEGRATING ORAL
Status: DISPENSED
Start: 2020-06-15

## (undated) RX ORDER — ACETAMINOPHEN 325 MG/1
TABLET ORAL
Status: DISPENSED
Start: 2020-06-15